# Patient Record
Sex: FEMALE | Race: WHITE | Employment: OTHER | URBAN - METROPOLITAN AREA
[De-identification: names, ages, dates, MRNs, and addresses within clinical notes are randomized per-mention and may not be internally consistent; named-entity substitution may affect disease eponyms.]

---

## 2018-01-16 PROBLEM — K81.9 CHOLECYSTITIS: Status: ACTIVE | Noted: 2018-01-16

## 2018-02-06 ENCOUNTER — OFFICE VISIT (OUTPATIENT)
Dept: SURGERY | Age: 83
End: 2018-02-06

## 2018-02-06 VITALS — HEIGHT: 61 IN | BODY MASS INDEX: 37.76 KG/M2 | WEIGHT: 200 LBS

## 2018-02-06 DIAGNOSIS — K81.9 CHOLECYSTITIS: Primary | ICD-10-CM

## 2018-02-06 PROCEDURE — 99024 POSTOP FOLLOW-UP VISIT: CPT | Performed by: SURGERY

## 2019-01-03 ENCOUNTER — HOSPITAL ENCOUNTER (EMERGENCY)
Age: 84
Discharge: HOME | End: 2019-01-03
Attending: EMERGENCY MEDICINE
Payer: MEDICARE

## 2019-01-03 ENCOUNTER — APPOINTMENT (OUTPATIENT)
Dept: GENERAL RADIOLOGY | Age: 84
End: 2019-01-03
Payer: MEDICARE

## 2019-01-03 VITALS
SYSTOLIC BLOOD PRESSURE: 118 MMHG | TEMPERATURE: 99.3 F | WEIGHT: 225.53 LBS | DIASTOLIC BLOOD PRESSURE: 74 MMHG | HEART RATE: 84 BPM | BODY MASS INDEX: 41.5 KG/M2 | RESPIRATION RATE: 20 BRPM | HEIGHT: 62 IN | OXYGEN SATURATION: 94 %

## 2019-01-03 DIAGNOSIS — R05.3 PERSISTENT COUGH: Primary | ICD-10-CM

## 2019-01-03 LAB
A/G RATIO: 0.9 (ref 1.1–2.2)
ALBUMIN SERPL-MCNC: 3.6 G/DL (ref 3.4–5)
ALP BLD-CCNC: 102 U/L (ref 40–129)
ALT SERPL-CCNC: 12 U/L (ref 10–40)
ANION GAP SERPL CALCULATED.3IONS-SCNC: 12 MMOL/L (ref 3–16)
AST SERPL-CCNC: 18 U/L (ref 15–37)
BILIRUB SERPL-MCNC: 0.6 MG/DL (ref 0–1)
BUN BLDV-MCNC: 20 MG/DL (ref 7–20)
CALCIUM SERPL-MCNC: 8.9 MG/DL (ref 8.3–10.6)
CHLORIDE BLD-SCNC: 100 MMOL/L (ref 99–110)
CO2: 29 MMOL/L (ref 21–32)
CREAT SERPL-MCNC: 1 MG/DL (ref 0.6–1.2)
EKG ATRIAL RATE: 87 BPM
EKG DIAGNOSIS: NORMAL
EKG P AXIS: 56 DEGREES
EKG P-R INTERVAL: 168 MS
EKG Q-T INTERVAL: 366 MS
EKG QRS DURATION: 70 MS
EKG QTC CALCULATION (BAZETT): 440 MS
EKG R AXIS: 48 DEGREES
EKG T AXIS: 71 DEGREES
EKG VENTRICULAR RATE: 87 BPM
GFR AFRICAN AMERICAN: >60
GFR NON-AFRICAN AMERICAN: 53
GLOBULIN: 4.1 G/DL
GLUCOSE BLD-MCNC: 106 MG/DL (ref 70–99)
HCT VFR BLD CALC: 39.9 % (ref 36–48)
HEMOGLOBIN: 13.2 G/DL (ref 12–16)
MCH RBC QN AUTO: 29.1 PG (ref 26–34)
MCHC RBC AUTO-ENTMCNC: 33 G/DL (ref 31–36)
MCV RBC AUTO: 88.2 FL (ref 80–100)
PDW BLD-RTO: 14.3 % (ref 12.4–15.4)
PLATELET # BLD: 241 K/UL (ref 135–450)
PMV BLD AUTO: 6.9 FL (ref 5–10.5)
POTASSIUM SERPL-SCNC: 4 MMOL/L (ref 3.5–5.1)
RBC # BLD: 4.53 M/UL (ref 4–5.2)
SODIUM BLD-SCNC: 141 MMOL/L (ref 136–145)
TOTAL PROTEIN: 7.7 G/DL (ref 6.4–8.2)
TROPONIN: <0.01 NG/ML
WBC # BLD: 11 K/UL (ref 4–11)

## 2019-01-03 PROCEDURE — 96374 THER/PROPH/DIAG INJ IV PUSH: CPT

## 2019-01-03 PROCEDURE — 71046 X-RAY EXAM CHEST 2 VIEWS: CPT

## 2019-01-03 PROCEDURE — 93005 ELECTROCARDIOGRAM TRACING: CPT | Performed by: EMERGENCY MEDICINE

## 2019-01-03 PROCEDURE — 84484 ASSAY OF TROPONIN QUANT: CPT

## 2019-01-03 PROCEDURE — 6370000000 HC RX 637 (ALT 250 FOR IP): Performed by: NURSE PRACTITIONER

## 2019-01-03 PROCEDURE — 99284 EMERGENCY DEPT VISIT MOD MDM: CPT

## 2019-01-03 PROCEDURE — 85027 COMPLETE CBC AUTOMATED: CPT

## 2019-01-03 PROCEDURE — 93010 ELECTROCARDIOGRAM REPORT: CPT | Performed by: INTERNAL MEDICINE

## 2019-01-03 PROCEDURE — 94640 AIRWAY INHALATION TREATMENT: CPT

## 2019-01-03 PROCEDURE — 6360000002 HC RX W HCPCS: Performed by: NURSE PRACTITIONER

## 2019-01-03 PROCEDURE — 80053 COMPREHEN METABOLIC PANEL: CPT

## 2019-01-03 PROCEDURE — 36415 COLL VENOUS BLD VENIPUNCTURE: CPT

## 2019-01-03 RX ORDER — IPRATROPIUM BROMIDE AND ALBUTEROL SULFATE 2.5; .5 MG/3ML; MG/3ML
1 SOLUTION RESPIRATORY (INHALATION) ONCE
Status: COMPLETED | OUTPATIENT
Start: 2019-01-03 | End: 2019-01-03

## 2019-01-03 RX ORDER — OXYMETAZOLINE HYDROCHLORIDE 0.05 G/100ML
2 SPRAY NASAL 2 TIMES DAILY
Qty: 1 BOTTLE | Refills: 0 | Status: SHIPPED | OUTPATIENT
Start: 2019-01-03 | End: 2019-01-06

## 2019-01-03 RX ORDER — DEXTROMETHORPHAN HYDROBROMIDE AND PROMETHAZINE HYDROCHLORIDE 15; 6.25 MG/5ML; MG/5ML
5 SYRUP ORAL 4 TIMES DAILY PRN
Qty: 118 ML | Refills: 0 | Status: ON HOLD | OUTPATIENT
Start: 2019-01-03 | End: 2020-01-01

## 2019-01-03 RX ORDER — METHYLPREDNISOLONE 4 MG/1
TABLET ORAL
Qty: 1 KIT | Refills: 0 | Status: ON HOLD | OUTPATIENT
Start: 2019-01-03 | End: 2020-01-01

## 2019-01-03 RX ORDER — HYDROCHLOROTHIAZIDE 25 MG/1
25 TABLET ORAL DAILY
COMMUNITY
Start: 2018-12-17 | End: 2019-01-03

## 2019-01-03 RX ORDER — METHYLPREDNISOLONE SODIUM SUCCINATE 125 MG/2ML
125 INJECTION, POWDER, LYOPHILIZED, FOR SOLUTION INTRAMUSCULAR; INTRAVENOUS ONCE
Status: COMPLETED | OUTPATIENT
Start: 2019-01-03 | End: 2019-01-03

## 2019-01-03 RX ADMIN — IPRATROPIUM BROMIDE AND ALBUTEROL SULFATE 1 AMPULE: .5; 3 SOLUTION RESPIRATORY (INHALATION) at 03:27

## 2019-01-03 RX ADMIN — METHYLPREDNISOLONE SODIUM SUCCINATE 125 MG: 125 INJECTION, POWDER, FOR SOLUTION INTRAMUSCULAR; INTRAVENOUS at 02:55

## 2019-01-03 RX ADMIN — HYDROCODONE BITARTRATE AND HOMATROPINE METHYLBROMIDE 5 ML: 5; 1.5 SOLUTION ORAL at 02:57

## 2019-01-03 ASSESSMENT — PAIN DESCRIPTION - PAIN TYPE: TYPE: ACUTE PAIN

## 2019-01-03 ASSESSMENT — ENCOUNTER SYMPTOMS
BACK PAIN: 0
SHORTNESS OF BREATH: 1
ABDOMINAL PAIN: 0
NAUSEA: 0
VOMITING: 0
DIARRHEA: 0
COUGH: 1
CHEST TIGHTNESS: 0
COLOR CHANGE: 0

## 2019-01-03 ASSESSMENT — PAIN DESCRIPTION - DESCRIPTORS: DESCRIPTORS: ACHING

## 2019-01-03 ASSESSMENT — PAIN SCALES - GENERAL: PAINLEVEL_OUTOF10: 1

## 2020-01-01 ENCOUNTER — APPOINTMENT (OUTPATIENT)
Dept: CT IMAGING | Age: 85
DRG: 189 | End: 2020-01-01
Payer: MEDICARE

## 2020-01-01 ENCOUNTER — APPOINTMENT (OUTPATIENT)
Dept: GENERAL RADIOLOGY | Age: 85
DRG: 189 | End: 2020-01-01
Payer: MEDICARE

## 2020-01-01 ENCOUNTER — HOSPITAL ENCOUNTER (INPATIENT)
Age: 85
LOS: 7 days | Discharge: HOME HEALTH | DRG: 189 | End: 2020-09-29
Attending: STUDENT IN AN ORGANIZED HEALTH CARE EDUCATION/TRAINING PROGRAM | Admitting: STUDENT IN AN ORGANIZED HEALTH CARE EDUCATION/TRAINING PROGRAM
Payer: MEDICARE

## 2020-01-01 ENCOUNTER — TELEPHONE (OUTPATIENT)
Dept: PULMONOLOGY | Age: 85
End: 2020-01-01

## 2020-01-01 ENCOUNTER — OFFICE VISIT (OUTPATIENT)
Dept: PULMONOLOGY | Age: 85
End: 2020-01-01
Payer: MEDICARE

## 2020-01-01 ENCOUNTER — CARE COORDINATION (OUTPATIENT)
Dept: CASE MANAGEMENT | Age: 85
End: 2020-01-01

## 2020-01-01 ENCOUNTER — APPOINTMENT (OUTPATIENT)
Dept: CT IMAGING | Age: 85
End: 2020-01-01
Payer: MEDICARE

## 2020-01-01 ENCOUNTER — HOSPITAL ENCOUNTER (INPATIENT)
Age: 85
LOS: 3 days | Discharge: HOME | DRG: 189 | End: 2020-05-12
Attending: STUDENT IN AN ORGANIZED HEALTH CARE EDUCATION/TRAINING PROGRAM | Admitting: INTERNAL MEDICINE
Payer: MEDICARE

## 2020-01-01 ENCOUNTER — APPOINTMENT (OUTPATIENT)
Dept: GENERAL RADIOLOGY | Age: 85
End: 2020-01-01
Payer: MEDICARE

## 2020-01-01 ENCOUNTER — APPOINTMENT (OUTPATIENT)
Dept: GENERAL RADIOLOGY | Age: 85
DRG: 208 | End: 2020-01-01
Payer: MEDICARE

## 2020-01-01 ENCOUNTER — HOSPITAL ENCOUNTER (INPATIENT)
Age: 85
LOS: 1 days | DRG: 208 | End: 2020-10-02
Attending: EMERGENCY MEDICINE | Admitting: INTERNAL MEDICINE
Payer: MEDICARE

## 2020-01-01 ENCOUNTER — HOSPITAL ENCOUNTER (EMERGENCY)
Age: 85
Discharge: HOME | End: 2020-06-14
Attending: EMERGENCY MEDICINE
Payer: MEDICARE

## 2020-01-01 ENCOUNTER — APPOINTMENT (OUTPATIENT)
Dept: CT IMAGING | Age: 85
DRG: 208 | End: 2020-01-01
Payer: MEDICARE

## 2020-01-01 VITALS
HEART RATE: 80 BPM | RESPIRATION RATE: 16 BRPM | SYSTOLIC BLOOD PRESSURE: 130 MMHG | HEIGHT: 61 IN | TEMPERATURE: 98.1 F | DIASTOLIC BLOOD PRESSURE: 78 MMHG | BODY MASS INDEX: 44.02 KG/M2 | OXYGEN SATURATION: 96 %

## 2020-01-01 VITALS
TEMPERATURE: 98.7 F | BODY MASS INDEX: 45.23 KG/M2 | DIASTOLIC BLOOD PRESSURE: 60 MMHG | HEART RATE: 106 BPM | OXYGEN SATURATION: 97 % | HEIGHT: 60 IN | SYSTOLIC BLOOD PRESSURE: 131 MMHG | WEIGHT: 230.38 LBS | RESPIRATION RATE: 16 BRPM

## 2020-01-01 VITALS
BODY MASS INDEX: 51.04 KG/M2 | HEART RATE: 85 BPM | HEIGHT: 60 IN | DIASTOLIC BLOOD PRESSURE: 86 MMHG | TEMPERATURE: 98.3 F | OXYGEN SATURATION: 97 % | WEIGHT: 260 LBS | SYSTOLIC BLOOD PRESSURE: 150 MMHG | RESPIRATION RATE: 20 BRPM

## 2020-01-01 VITALS
DIASTOLIC BLOOD PRESSURE: 68 MMHG | TEMPERATURE: 97.9 F | HEART RATE: 84 BPM | WEIGHT: 230 LBS | HEIGHT: 60 IN | SYSTOLIC BLOOD PRESSURE: 129 MMHG | RESPIRATION RATE: 22 BRPM | BODY MASS INDEX: 45.16 KG/M2 | OXYGEN SATURATION: 99 %

## 2020-01-01 VITALS
BODY MASS INDEX: 43.99 KG/M2 | TEMPERATURE: 98.1 F | HEIGHT: 61 IN | SYSTOLIC BLOOD PRESSURE: 179 MMHG | OXYGEN SATURATION: 99 % | RESPIRATION RATE: 20 BRPM | DIASTOLIC BLOOD PRESSURE: 82 MMHG | HEART RATE: 79 BPM | WEIGHT: 233 LBS

## 2020-01-01 VITALS
SYSTOLIC BLOOD PRESSURE: 99 MMHG | DIASTOLIC BLOOD PRESSURE: 49 MMHG | RESPIRATION RATE: 25 BRPM | OXYGEN SATURATION: 94 % | TEMPERATURE: 98 F | HEART RATE: 86 BPM | BODY MASS INDEX: 44.1 KG/M2 | WEIGHT: 224.65 LBS | HEIGHT: 60 IN

## 2020-01-01 LAB
A/G RATIO: 0.9 (ref 1.1–2.2)
A/G RATIO: 1 (ref 1.1–2.2)
ACETAMINOPHEN LEVEL: <5 UG/ML (ref 10–30)
ALBUMIN SERPL-MCNC: 3 G/DL (ref 3.4–5)
ALBUMIN SERPL-MCNC: 3.2 G/DL (ref 3.4–5)
ALBUMIN SERPL-MCNC: 3.2 G/DL (ref 3.4–5)
ALBUMIN SERPL-MCNC: 3.5 G/DL (ref 3.4–5)
ALBUMIN SERPL-MCNC: 3.6 G/DL (ref 3.4–5)
ALBUMIN SERPL-MCNC: 3.7 G/DL (ref 3.4–5)
ALP BLD-CCNC: 71 U/L (ref 40–129)
ALP BLD-CCNC: 82 U/L (ref 40–129)
ALP BLD-CCNC: 86 U/L (ref 40–129)
ALT SERPL-CCNC: 11 U/L (ref 10–40)
ALT SERPL-CCNC: 12 U/L (ref 10–40)
ALT SERPL-CCNC: 15 U/L (ref 10–40)
AMPHETAMINE SCREEN, URINE: NORMAL
ANION GAP SERPL CALCULATED.3IONS-SCNC: 10 MMOL/L (ref 3–16)
ANION GAP SERPL CALCULATED.3IONS-SCNC: 10 MMOL/L (ref 3–16)
ANION GAP SERPL CALCULATED.3IONS-SCNC: 14 MMOL/L (ref 3–16)
ANION GAP SERPL CALCULATED.3IONS-SCNC: 15 MMOL/L (ref 3–16)
ANION GAP SERPL CALCULATED.3IONS-SCNC: 23 MMOL/L (ref 3–16)
ANION GAP SERPL CALCULATED.3IONS-SCNC: 3 MMOL/L (ref 3–16)
ANION GAP SERPL CALCULATED.3IONS-SCNC: 3 MMOL/L (ref 3–16)
ANION GAP SERPL CALCULATED.3IONS-SCNC: 6 MMOL/L (ref 3–16)
ANION GAP SERPL CALCULATED.3IONS-SCNC: 6 MMOL/L (ref 3–16)
ANION GAP SERPL CALCULATED.3IONS-SCNC: 7 MMOL/L (ref 3–16)
ANION GAP SERPL CALCULATED.3IONS-SCNC: 8 MMOL/L (ref 3–16)
ANISOCYTOSIS: ABNORMAL
APTT: 30.8 SEC (ref 24.2–36.2)
AST SERPL-CCNC: 16 U/L (ref 15–37)
AST SERPL-CCNC: 20 U/L (ref 15–37)
AST SERPL-CCNC: 23 U/L (ref 15–37)
BACTERIA: ABNORMAL /HPF
BANDED NEUTROPHILS RELATIVE PERCENT: 2 % (ref 0–7)
BARBITURATE SCREEN URINE: NORMAL
BASE EXCESS ARTERIAL: -9.8 MMOL/L (ref -3–3)
BASE EXCESS ARTERIAL: 10.7 MMOL/L (ref -3–3)
BASE EXCESS ARTERIAL: 13.3 MMOL/L (ref -3–3)
BASE EXCESS ARTERIAL: 18.2 MMOL/L (ref -3–3)
BASE EXCESS ARTERIAL: 3.5 MMOL/L (ref -3–3)
BASE EXCESS ARTERIAL: 4.9 MMOL/L (ref -3–3)
BASE EXCESS ARTERIAL: 5.2 MMOL/L (ref -3–3)
BASE EXCESS VENOUS: 10.6 MMOL/L
BASE EXCESS VENOUS: 4.9 MMOL/L
BASE EXCESS VENOUS: 7.7 MMOL/L
BASOPHILS ABSOLUTE: 0 K/UL (ref 0–0.2)
BASOPHILS ABSOLUTE: 0.1 K/UL (ref 0–0.2)
BASOPHILS ABSOLUTE: 0.1 K/UL (ref 0–0.2)
BASOPHILS RELATIVE PERCENT: 0 %
BASOPHILS RELATIVE PERCENT: 0.1 %
BASOPHILS RELATIVE PERCENT: 0.2 %
BASOPHILS RELATIVE PERCENT: 0.3 %
BASOPHILS RELATIVE PERCENT: 0.3 %
BASOPHILS RELATIVE PERCENT: 0.7 %
BASOPHILS RELATIVE PERCENT: 1.1 %
BENZODIAZEPINE SCREEN, URINE: NORMAL
BILIRUB SERPL-MCNC: 0.5 MG/DL (ref 0–1)
BILIRUB SERPL-MCNC: 0.7 MG/DL (ref 0–1)
BILIRUB SERPL-MCNC: 0.8 MG/DL (ref 0–1)
BILIRUBIN DIRECT: <0.2 MG/DL (ref 0–0.3)
BILIRUBIN URINE: NEGATIVE
BILIRUBIN, INDIRECT: NORMAL MG/DL (ref 0–1)
BLOOD CULTURE, ROUTINE: ABNORMAL
BLOOD CULTURE, ROUTINE: NORMAL
BLOOD CULTURE, ROUTINE: NORMAL
BLOOD, URINE: ABNORMAL
BLOOD, URINE: ABNORMAL
BLOOD, URINE: NEGATIVE
BUN BLDV-MCNC: 15 MG/DL (ref 7–20)
BUN BLDV-MCNC: 16 MG/DL (ref 7–20)
BUN BLDV-MCNC: 16 MG/DL (ref 7–20)
BUN BLDV-MCNC: 18 MG/DL (ref 7–20)
BUN BLDV-MCNC: 22 MG/DL (ref 7–20)
BUN BLDV-MCNC: 23 MG/DL (ref 7–20)
BUN BLDV-MCNC: 24 MG/DL (ref 7–20)
BUN BLDV-MCNC: 26 MG/DL (ref 7–20)
BUN BLDV-MCNC: 28 MG/DL (ref 7–20)
BUN BLDV-MCNC: 29 MG/DL (ref 7–20)
BUN BLDV-MCNC: 29 MG/DL (ref 7–20)
BUN BLDV-MCNC: 38 MG/DL (ref 7–20)
BUN BLDV-MCNC: 39 MG/DL (ref 7–20)
BUN BLDV-MCNC: 39 MG/DL (ref 7–20)
BUN BLDV-MCNC: 41 MG/DL (ref 7–20)
BUN BLDV-MCNC: 42 MG/DL (ref 7–20)
CALCIUM IONIZED: 1.03 MMOL/L (ref 1.12–1.32)
CALCIUM IONIZED: 1.06 MMOL/L (ref 1.12–1.32)
CALCIUM SERPL-MCNC: 8 MG/DL (ref 8.3–10.6)
CALCIUM SERPL-MCNC: 8.1 MG/DL (ref 8.3–10.6)
CALCIUM SERPL-MCNC: 8.1 MG/DL (ref 8.3–10.6)
CALCIUM SERPL-MCNC: 8.2 MG/DL (ref 8.3–10.6)
CALCIUM SERPL-MCNC: 8.3 MG/DL (ref 8.3–10.6)
CALCIUM SERPL-MCNC: 8.3 MG/DL (ref 8.3–10.6)
CALCIUM SERPL-MCNC: 8.4 MG/DL (ref 8.3–10.6)
CALCIUM SERPL-MCNC: 8.5 MG/DL (ref 8.3–10.6)
CALCIUM SERPL-MCNC: 8.7 MG/DL (ref 8.3–10.6)
CALCIUM SERPL-MCNC: 9.5 MG/DL (ref 8.3–10.6)
CANNABINOID SCREEN URINE: NORMAL
CARBOXYHEMOGLOBIN ARTERIAL: 0 % (ref 0–1.5)
CARBOXYHEMOGLOBIN ARTERIAL: 0.1 % (ref 0–1.5)
CARBOXYHEMOGLOBIN ARTERIAL: 0.3 % (ref 0–1.5)
CARBOXYHEMOGLOBIN ARTERIAL: 0.3 % (ref 0–1.5)
CARBOXYHEMOGLOBIN ARTERIAL: 0.6 % (ref 0–1.5)
CARBOXYHEMOGLOBIN ARTERIAL: 0.8 % (ref 0–1.5)
CARBOXYHEMOGLOBIN ARTERIAL: 0.8 % (ref 0–1.5)
CARBOXYHEMOGLOBIN: 1 %
CARBOXYHEMOGLOBIN: 1.1 %
CARBOXYHEMOGLOBIN: 1.3 %
CARBOXYHEMOGLOBIN: 1.3 %
CARBOXYHEMOGLOBIN: 1.5 %
CHLORIDE BLD-SCNC: 101 MMOL/L (ref 99–110)
CHLORIDE BLD-SCNC: 88 MMOL/L (ref 99–110)
CHLORIDE BLD-SCNC: 89 MMOL/L (ref 99–110)
CHLORIDE BLD-SCNC: 89 MMOL/L (ref 99–110)
CHLORIDE BLD-SCNC: 90 MMOL/L (ref 99–110)
CHLORIDE BLD-SCNC: 90 MMOL/L (ref 99–110)
CHLORIDE BLD-SCNC: 91 MMOL/L (ref 99–110)
CHLORIDE BLD-SCNC: 93 MMOL/L (ref 99–110)
CHLORIDE BLD-SCNC: 94 MMOL/L (ref 99–110)
CHLORIDE BLD-SCNC: 95 MMOL/L (ref 99–110)
CHLORIDE BLD-SCNC: 97 MMOL/L (ref 99–110)
CHP ED QC CHECK: YES
CLARITY: ABNORMAL
CLARITY: CLEAR
CLARITY: CLEAR
CO2: 21 MMOL/L (ref 21–32)
CO2: 24 MMOL/L (ref 21–32)
CO2: 27 MMOL/L (ref 21–32)
CO2: 31 MMOL/L (ref 21–32)
CO2: 31 MMOL/L (ref 21–32)
CO2: 32 MMOL/L (ref 21–32)
CO2: 32 MMOL/L (ref 21–32)
CO2: 34 MMOL/L (ref 21–32)
CO2: 35 MMOL/L (ref 21–32)
CO2: 39 MMOL/L (ref 21–32)
CO2: 40 MMOL/L (ref 21–32)
CO2: 40 MMOL/L (ref 21–32)
CO2: 41 MMOL/L (ref 21–32)
CO2: 41 MMOL/L (ref 21–32)
CO2: 43 MMOL/L (ref 21–32)
CO2: 44 MMOL/L (ref 21–32)
COCAINE METABOLITE SCREEN URINE: NORMAL
COLOR: YELLOW
CREAT SERPL-MCNC: 0.7 MG/DL (ref 0.6–1.2)
CREAT SERPL-MCNC: 0.8 MG/DL (ref 0.6–1.2)
CREAT SERPL-MCNC: 0.9 MG/DL (ref 0.6–1.2)
CREAT SERPL-MCNC: 1 MG/DL (ref 0.6–1.2)
CREAT SERPL-MCNC: 1.2 MG/DL (ref 0.6–1.2)
CREAT SERPL-MCNC: 1.3 MG/DL (ref 0.6–1.2)
CREAT SERPL-MCNC: 1.4 MG/DL (ref 0.6–1.2)
CREAT SERPL-MCNC: 1.5 MG/DL (ref 0.6–1.2)
CULTURE, BLOOD 2: ABNORMAL
CULTURE, BLOOD 2: NORMAL
D DIMER: 915 NG/ML DDU (ref 0–229)
EKG ATRIAL RATE: 117 BPM
EKG ATRIAL RATE: 139 BPM
EKG ATRIAL RATE: 77 BPM
EKG ATRIAL RATE: 88 BPM
EKG ATRIAL RATE: 91 BPM
EKG DIAGNOSIS: NORMAL
EKG P AXIS: -14 DEGREES
EKG P AXIS: -22 DEGREES
EKG P AXIS: 58 DEGREES
EKG P AXIS: 60 DEGREES
EKG P AXIS: 68 DEGREES
EKG P-R INTERVAL: 138 MS
EKG P-R INTERVAL: 150 MS
EKG P-R INTERVAL: 160 MS
EKG P-R INTERVAL: 162 MS
EKG P-R INTERVAL: 182 MS
EKG Q-T INTERVAL: 214 MS
EKG Q-T INTERVAL: 324 MS
EKG Q-T INTERVAL: 346 MS
EKG Q-T INTERVAL: 358 MS
EKG Q-T INTERVAL: 378 MS
EKG QRS DURATION: 70 MS
EKG QRS DURATION: 70 MS
EKG QRS DURATION: 72 MS
EKG QRS DURATION: 74 MS
EKG QRS DURATION: 84 MS
EKG QTC CALCULATION (BAZETT): 325 MS
EKG QTC CALCULATION (BAZETT): 425 MS
EKG QTC CALCULATION (BAZETT): 427 MS
EKG QTC CALCULATION (BAZETT): 433 MS
EKG QTC CALCULATION (BAZETT): 451 MS
EKG R AXIS: -33 DEGREES
EKG R AXIS: 16 DEGREES
EKG R AXIS: 17 DEGREES
EKG R AXIS: 26 DEGREES
EKG R AXIS: 9 DEGREES
EKG T AXIS: 256 DEGREES
EKG T AXIS: 50 DEGREES
EKG T AXIS: 58 DEGREES
EKG T AXIS: 64 DEGREES
EKG T AXIS: 81 DEGREES
EKG VENTRICULAR RATE: 117 BPM
EKG VENTRICULAR RATE: 139 BPM
EKG VENTRICULAR RATE: 77 BPM
EKG VENTRICULAR RATE: 88 BPM
EKG VENTRICULAR RATE: 91 BPM
EOSINOPHILS ABSOLUTE: 0 K/UL (ref 0–0.6)
EOSINOPHILS ABSOLUTE: 0.1 K/UL (ref 0–0.6)
EOSINOPHILS ABSOLUTE: 0.2 K/UL (ref 0–0.6)
EOSINOPHILS RELATIVE PERCENT: 0 %
EOSINOPHILS RELATIVE PERCENT: 0.4 %
EOSINOPHILS RELATIVE PERCENT: 0.7 %
EOSINOPHILS RELATIVE PERCENT: 0.9 %
EOSINOPHILS RELATIVE PERCENT: 1 %
EOSINOPHILS RELATIVE PERCENT: 1.3 %
EOSINOPHILS RELATIVE PERCENT: 1.5 %
EOSINOPHILS RELATIVE PERCENT: 1.9 %
EPITHELIAL CELLS, UA: 0 /HPF (ref 0–5)
EPITHELIAL CELLS, UA: 1 /HPF (ref 0–5)
ESTIMATED AVERAGE GLUCOSE: 151.3 MG/DL
ETHANOL: NORMAL MG/DL (ref 0–0.08)
GFR AFRICAN AMERICAN: 40
GFR AFRICAN AMERICAN: 43
GFR AFRICAN AMERICAN: 47
GFR AFRICAN AMERICAN: 52
GFR AFRICAN AMERICAN: >60
GFR NON-AFRICAN AMERICAN: 33
GFR NON-AFRICAN AMERICAN: 36
GFR NON-AFRICAN AMERICAN: 39
GFR NON-AFRICAN AMERICAN: 43
GFR NON-AFRICAN AMERICAN: 53
GFR NON-AFRICAN AMERICAN: 59
GFR NON-AFRICAN AMERICAN: >60
GLOBULIN: 3.4 G/DL
GLOBULIN: 4.2 G/DL
GLUCOSE BLD-MCNC: 116 MG/DL (ref 70–99)
GLUCOSE BLD-MCNC: 117 MG/DL (ref 70–99)
GLUCOSE BLD-MCNC: 119 MG/DL (ref 70–99)
GLUCOSE BLD-MCNC: 125 MG/DL (ref 70–99)
GLUCOSE BLD-MCNC: 140 MG/DL (ref 70–99)
GLUCOSE BLD-MCNC: 158 MG/DL (ref 70–99)
GLUCOSE BLD-MCNC: 166 MG/DL (ref 70–99)
GLUCOSE BLD-MCNC: 172 MG/DL (ref 70–99)
GLUCOSE BLD-MCNC: 179 MG/DL
GLUCOSE BLD-MCNC: 179 MG/DL (ref 70–99)
GLUCOSE BLD-MCNC: 181 MG/DL (ref 70–99)
GLUCOSE BLD-MCNC: 190 MG/DL (ref 70–99)
GLUCOSE BLD-MCNC: 195 MG/DL (ref 70–99)
GLUCOSE BLD-MCNC: 196 MG/DL (ref 70–99)
GLUCOSE BLD-MCNC: 198 MG/DL (ref 70–99)
GLUCOSE BLD-MCNC: 207 MG/DL (ref 70–99)
GLUCOSE BLD-MCNC: 215 MG/DL (ref 70–99)
GLUCOSE BLD-MCNC: 217 MG/DL (ref 70–99)
GLUCOSE BLD-MCNC: 228 MG/DL (ref 70–99)
GLUCOSE BLD-MCNC: 229 MG/DL (ref 70–99)
GLUCOSE BLD-MCNC: 230 MG/DL (ref 70–99)
GLUCOSE BLD-MCNC: 233 MG/DL (ref 70–99)
GLUCOSE BLD-MCNC: 233 MG/DL (ref 70–99)
GLUCOSE BLD-MCNC: 236 MG/DL (ref 70–99)
GLUCOSE BLD-MCNC: 242 MG/DL (ref 70–99)
GLUCOSE BLD-MCNC: 253 MG/DL (ref 70–99)
GLUCOSE BLD-MCNC: 253 MG/DL (ref 70–99)
GLUCOSE BLD-MCNC: 259 MG/DL (ref 70–99)
GLUCOSE BLD-MCNC: 261 MG/DL (ref 70–99)
GLUCOSE BLD-MCNC: 262 MG/DL (ref 70–99)
GLUCOSE BLD-MCNC: 266 MG/DL (ref 70–99)
GLUCOSE BLD-MCNC: 266 MG/DL (ref 70–99)
GLUCOSE BLD-MCNC: 271 MG/DL (ref 70–99)
GLUCOSE BLD-MCNC: 276 MG/DL (ref 70–99)
GLUCOSE BLD-MCNC: 283 MG/DL (ref 70–99)
GLUCOSE BLD-MCNC: 291 MG/DL (ref 70–99)
GLUCOSE BLD-MCNC: 293 MG/DL (ref 70–99)
GLUCOSE BLD-MCNC: 293 MG/DL (ref 70–99)
GLUCOSE BLD-MCNC: 295 MG/DL (ref 70–99)
GLUCOSE BLD-MCNC: 295 MG/DL (ref 70–99)
GLUCOSE BLD-MCNC: 298 MG/DL (ref 70–99)
GLUCOSE BLD-MCNC: 300 MG/DL (ref 70–99)
GLUCOSE BLD-MCNC: 301 MG/DL (ref 70–99)
GLUCOSE BLD-MCNC: 305 MG/DL (ref 70–99)
GLUCOSE BLD-MCNC: 313 MG/DL (ref 70–99)
GLUCOSE BLD-MCNC: 317 MG/DL (ref 70–99)
GLUCOSE BLD-MCNC: 324 MG/DL (ref 70–99)
GLUCOSE BLD-MCNC: 327 MG/DL (ref 70–99)
GLUCOSE BLD-MCNC: 328 MG/DL (ref 70–99)
GLUCOSE BLD-MCNC: 331 MG/DL (ref 70–99)
GLUCOSE BLD-MCNC: 332 MG/DL (ref 70–99)
GLUCOSE BLD-MCNC: 345 MG/DL (ref 70–99)
GLUCOSE BLD-MCNC: 347 MG/DL (ref 70–99)
GLUCOSE BLD-MCNC: 350 MG/DL (ref 70–99)
GLUCOSE BLD-MCNC: 357 MG/DL (ref 70–99)
GLUCOSE BLD-MCNC: 366 MG/DL (ref 70–99)
GLUCOSE BLD-MCNC: 366 MG/DL (ref 70–99)
GLUCOSE BLD-MCNC: 379 MG/DL (ref 70–99)
GLUCOSE BLD-MCNC: 405 MG/DL (ref 70–99)
GLUCOSE BLD-MCNC: 415 MG/DL (ref 70–99)
GLUCOSE BLD-MCNC: 43 MG/DL (ref 70–99)
GLUCOSE BLD-MCNC: 434 MG/DL (ref 70–99)
GLUCOSE BLD-MCNC: 464 MG/DL (ref 70–99)
GLUCOSE BLD-MCNC: 471 MG/DL (ref 70–99)
GLUCOSE BLD-MCNC: 473 MG/DL (ref 70–99)
GLUCOSE BLD-MCNC: 54 MG/DL (ref 70–99)
GLUCOSE BLD-MCNC: 62 MG/DL (ref 70–99)
GLUCOSE BLD-MCNC: 64 MG/DL (ref 70–99)
GLUCOSE BLD-MCNC: 76 MG/DL (ref 70–99)
GLUCOSE BLD-MCNC: 81 MG/DL (ref 70–99)
GLUCOSE BLD-MCNC: 88 MG/DL (ref 70–99)
GLUCOSE BLD-MCNC: 99 MG/DL (ref 70–99)
GLUCOSE URINE: >=1000 MG/DL
GLUCOSE URINE: NEGATIVE MG/DL
GLUCOSE URINE: NEGATIVE MG/DL
HBA1C MFR BLD: 6.9 %
HCO3 ARTERIAL: 18.1 MMOL/L (ref 21–29)
HCO3 ARTERIAL: 25.8 MMOL/L (ref 21–29)
HCO3 ARTERIAL: 35.4 MMOL/L (ref 21–29)
HCO3 ARTERIAL: 36 MMOL/L (ref 21–29)
HCO3 ARTERIAL: 41.5 MMOL/L (ref 21–29)
HCO3 ARTERIAL: 41.6 MMOL/L (ref 21–29)
HCO3 ARTERIAL: 46.5 MMOL/L (ref 21–29)
HCO3 VENOUS: 31 MMOL/L (ref 23–29)
HCO3 VENOUS: 39 MMOL/L (ref 23–29)
HCO3 VENOUS: 41 MMOL/L (ref 23–29)
HCT VFR BLD CALC: 31.3 % (ref 36–48)
HCT VFR BLD CALC: 32.3 % (ref 36–48)
HCT VFR BLD CALC: 32.7 % (ref 36–48)
HCT VFR BLD CALC: 32.9 % (ref 36–48)
HCT VFR BLD CALC: 33.2 % (ref 36–48)
HCT VFR BLD CALC: 34.1 % (ref 36–48)
HCT VFR BLD CALC: 34.1 % (ref 36–48)
HCT VFR BLD CALC: 35.6 % (ref 36–48)
HCT VFR BLD CALC: 36.3 % (ref 36–48)
HCT VFR BLD CALC: 38.6 % (ref 36–48)
HCT VFR BLD CALC: 38.6 % (ref 36–48)
HCT VFR BLD CALC: 39.2 % (ref 36–48)
HCT VFR BLD CALC: 41.8 % (ref 36–48)
HCT VFR BLD CALC: 43.9 % (ref 36–48)
HEMATOLOGY PATH CONSULT: NORMAL
HEMATOLOGY PATH CONSULT: YES
HEMOGLOBIN, ART, EXTENDED: 10.5 G/DL (ref 12–16)
HEMOGLOBIN, ART, EXTENDED: 11 G/DL (ref 12–16)
HEMOGLOBIN, ART, EXTENDED: 11 G/DL (ref 12–16)
HEMOGLOBIN, ART, EXTENDED: 11.6 G/DL (ref 12–16)
HEMOGLOBIN, ART, EXTENDED: 12.4 G/DL (ref 12–16)
HEMOGLOBIN, ART, EXTENDED: 13.6 G/DL (ref 12–16)
HEMOGLOBIN, ART, EXTENDED: 13.8 G/DL (ref 12–16)
HEMOGLOBIN: 10.3 G/DL (ref 12–16)
HEMOGLOBIN: 10.5 G/DL (ref 12–16)
HEMOGLOBIN: 10.5 G/DL (ref 12–16)
HEMOGLOBIN: 10.6 G/DL (ref 12–16)
HEMOGLOBIN: 10.8 G/DL (ref 12–16)
HEMOGLOBIN: 11 G/DL (ref 12–16)
HEMOGLOBIN: 11.4 G/DL (ref 12–16)
HEMOGLOBIN: 11.6 G/DL (ref 12–16)
HEMOGLOBIN: 11.7 G/DL (ref 12–16)
HEMOGLOBIN: 12.6 G/DL (ref 12–16)
HEMOGLOBIN: 12.8 G/DL (ref 12–16)
HEMOGLOBIN: 12.8 G/DL (ref 12–16)
HEMOGLOBIN: 13.4 G/DL (ref 12–16)
HEMOGLOBIN: 14 G/DL (ref 12–16)
HYALINE CASTS: 16 /LPF (ref 0–8)
HYALINE CASTS: 6 /LPF (ref 0–8)
INR BLD: 1.17 (ref 0.86–1.14)
KETONES, URINE: NEGATIVE MG/DL
LACTIC ACID, SEPSIS: 1.5 MMOL/L (ref 0.4–1.9)
LACTIC ACID: 0.7 MMOL/L (ref 0.4–2)
LACTIC ACID: 0.9 MMOL/L (ref 0.4–2)
LACTIC ACID: 0.9 MMOL/L (ref 0.4–2)
LACTIC ACID: 13.2 MMOL/L (ref 0.4–2)
LACTIC ACID: 4.2 MMOL/L (ref 0.4–2)
LEUKOCYTE ESTERASE, URINE: ABNORMAL
LEUKOCYTE ESTERASE, URINE: NEGATIVE
LEUKOCYTE ESTERASE, URINE: NEGATIVE
LYMPHOCYTES ABSOLUTE: 0.4 K/UL (ref 1–5.1)
LYMPHOCYTES ABSOLUTE: 0.5 K/UL (ref 1–5.1)
LYMPHOCYTES ABSOLUTE: 0.5 K/UL (ref 1–5.1)
LYMPHOCYTES ABSOLUTE: 0.6 K/UL (ref 1–5.1)
LYMPHOCYTES ABSOLUTE: 0.8 K/UL (ref 1–5.1)
LYMPHOCYTES ABSOLUTE: 1 K/UL (ref 1–5.1)
LYMPHOCYTES ABSOLUTE: 1.9 K/UL (ref 1–5.1)
LYMPHOCYTES ABSOLUTE: 2.2 K/UL (ref 1–5.1)
LYMPHOCYTES ABSOLUTE: 2.2 K/UL (ref 1–5.1)
LYMPHOCYTES ABSOLUTE: 2.5 K/UL (ref 1–5.1)
LYMPHOCYTES ABSOLUTE: 2.6 K/UL (ref 1–5.1)
LYMPHOCYTES ABSOLUTE: 6 K/UL (ref 1–5.1)
LYMPHOCYTES RELATIVE PERCENT: 10 %
LYMPHOCYTES RELATIVE PERCENT: 10 %
LYMPHOCYTES RELATIVE PERCENT: 10.1 %
LYMPHOCYTES RELATIVE PERCENT: 10.3 %
LYMPHOCYTES RELATIVE PERCENT: 14.3 %
LYMPHOCYTES RELATIVE PERCENT: 18.3 %
LYMPHOCYTES RELATIVE PERCENT: 20.1 %
LYMPHOCYTES RELATIVE PERCENT: 20.7 %
LYMPHOCYTES RELATIVE PERCENT: 21.7 %
LYMPHOCYTES RELATIVE PERCENT: 22.5 %
LYMPHOCYTES RELATIVE PERCENT: 37 %
LYMPHOCYTES RELATIVE PERCENT: 6 %
LYMPHOCYTES RELATIVE PERCENT: 7 %
LYMPHOCYTES RELATIVE PERCENT: 9.4 %
Lab: NORMAL
MAGNESIUM: 1.4 MG/DL (ref 1.8–2.4)
MAGNESIUM: 1.6 MG/DL (ref 1.8–2.4)
MAGNESIUM: 1.7 MG/DL (ref 1.8–2.4)
MAGNESIUM: 1.7 MG/DL (ref 1.8–2.4)
MAGNESIUM: 1.8 MG/DL (ref 1.8–2.4)
MAGNESIUM: 1.9 MG/DL (ref 1.8–2.4)
MAGNESIUM: 2.1 MG/DL (ref 1.8–2.4)
MAGNESIUM: 2.1 MG/DL (ref 1.8–2.4)
MAGNESIUM: 2.3 MG/DL (ref 1.8–2.4)
MCH RBC QN AUTO: 28.8 PG (ref 26–34)
MCH RBC QN AUTO: 28.9 PG (ref 26–34)
MCH RBC QN AUTO: 28.9 PG (ref 26–34)
MCH RBC QN AUTO: 29 PG (ref 26–34)
MCH RBC QN AUTO: 29.1 PG (ref 26–34)
MCH RBC QN AUTO: 29.2 PG (ref 26–34)
MCH RBC QN AUTO: 29.2 PG (ref 26–34)
MCH RBC QN AUTO: 29.3 PG (ref 26–34)
MCH RBC QN AUTO: 29.3 PG (ref 26–34)
MCH RBC QN AUTO: 29.5 PG (ref 26–34)
MCH RBC QN AUTO: 29.6 PG (ref 26–34)
MCH RBC QN AUTO: 29.7 PG (ref 26–34)
MCH RBC QN AUTO: 29.8 PG (ref 26–34)
MCH RBC QN AUTO: 29.9 PG (ref 26–34)
MCHC RBC AUTO-ENTMCNC: 30.7 G/DL (ref 31–36)
MCHC RBC AUTO-ENTMCNC: 31.9 G/DL (ref 31–36)
MCHC RBC AUTO-ENTMCNC: 31.9 G/DL (ref 31–36)
MCHC RBC AUTO-ENTMCNC: 32 G/DL (ref 31–36)
MCHC RBC AUTO-ENTMCNC: 32 G/DL (ref 31–36)
MCHC RBC AUTO-ENTMCNC: 32.5 G/DL (ref 31–36)
MCHC RBC AUTO-ENTMCNC: 32.6 G/DL (ref 31–36)
MCHC RBC AUTO-ENTMCNC: 32.6 G/DL (ref 31–36)
MCHC RBC AUTO-ENTMCNC: 32.9 G/DL (ref 31–36)
MCHC RBC AUTO-ENTMCNC: 33 G/DL (ref 31–36)
MCHC RBC AUTO-ENTMCNC: 33 G/DL (ref 31–36)
MCHC RBC AUTO-ENTMCNC: 33.1 G/DL (ref 31–36)
MCHC RBC AUTO-ENTMCNC: 33.4 G/DL (ref 31–36)
MCHC RBC AUTO-ENTMCNC: 33.5 G/DL (ref 31–36)
MCV RBC AUTO: 87.4 FL (ref 80–100)
MCV RBC AUTO: 87.5 FL (ref 80–100)
MCV RBC AUTO: 87.7 FL (ref 80–100)
MCV RBC AUTO: 88.4 FL (ref 80–100)
MCV RBC AUTO: 88.6 FL (ref 80–100)
MCV RBC AUTO: 89.8 FL (ref 80–100)
MCV RBC AUTO: 90 FL (ref 80–100)
MCV RBC AUTO: 90.3 FL (ref 80–100)
MCV RBC AUTO: 90.6 FL (ref 80–100)
MCV RBC AUTO: 90.7 FL (ref 80–100)
MCV RBC AUTO: 91 FL (ref 80–100)
MCV RBC AUTO: 91.4 FL (ref 80–100)
MCV RBC AUTO: 93.3 FL (ref 80–100)
MCV RBC AUTO: 95 FL (ref 80–100)
METAMYELOCYTES RELATIVE PERCENT: 2 %
METHADONE SCREEN, URINE: NORMAL
METHEMOGLOBIN ARTERIAL: 0.5 %
METHEMOGLOBIN ARTERIAL: 0.5 %
METHEMOGLOBIN ARTERIAL: 0.6 %
METHEMOGLOBIN ARTERIAL: 0.7 %
METHEMOGLOBIN ARTERIAL: 0.8 %
METHEMOGLOBIN ARTERIAL: 0.8 %
METHEMOGLOBIN ARTERIAL: 1.3 %
METHEMOGLOBIN VENOUS: 0.5 %
METHEMOGLOBIN VENOUS: 0.5 %
METHEMOGLOBIN VENOUS: 0.6 %
METHEMOGLOBIN VENOUS: 0.7 %
METHEMOGLOBIN VENOUS: 0.8 %
MICROSCOPIC EXAMINATION: NORMAL
MICROSCOPIC EXAMINATION: YES
MICROSCOPIC EXAMINATION: YES
MONOCYTES ABSOLUTE: 0.1 K/UL (ref 0–1.3)
MONOCYTES ABSOLUTE: 0.2 K/UL (ref 0–1.3)
MONOCYTES ABSOLUTE: 0.2 K/UL (ref 0–1.3)
MONOCYTES ABSOLUTE: 0.3 K/UL (ref 0–1.3)
MONOCYTES ABSOLUTE: 0.6 K/UL (ref 0–1.3)
MONOCYTES ABSOLUTE: 0.7 K/UL (ref 0–1.3)
MONOCYTES ABSOLUTE: 0.8 K/UL (ref 0–1.3)
MONOCYTES ABSOLUTE: 0.9 K/UL (ref 0–1.3)
MONOCYTES ABSOLUTE: 1 K/UL (ref 0–1.3)
MONOCYTES ABSOLUTE: 1 K/UL (ref 0–1.3)
MONOCYTES RELATIVE PERCENT: 1.2 %
MONOCYTES RELATIVE PERCENT: 1.3 %
MONOCYTES RELATIVE PERCENT: 1.6 %
MONOCYTES RELATIVE PERCENT: 2.5 %
MONOCYTES RELATIVE PERCENT: 3.5 %
MONOCYTES RELATIVE PERCENT: 3.7 %
MONOCYTES RELATIVE PERCENT: 5.9 %
MONOCYTES RELATIVE PERCENT: 6 %
MONOCYTES RELATIVE PERCENT: 7.3 %
MONOCYTES RELATIVE PERCENT: 7.4 %
MONOCYTES RELATIVE PERCENT: 7.5 %
MONOCYTES RELATIVE PERCENT: 7.7 %
MONOCYTES RELATIVE PERCENT: 8.1 %
MONOCYTES RELATIVE PERCENT: 9.1 %
NEUTROPHILS ABSOLUTE: 4.1 K/UL (ref 1.7–7.7)
NEUTROPHILS ABSOLUTE: 4.6 K/UL (ref 1.7–7.7)
NEUTROPHILS ABSOLUTE: 5.1 K/UL (ref 1.7–7.7)
NEUTROPHILS ABSOLUTE: 5.6 K/UL (ref 1.7–7.7)
NEUTROPHILS ABSOLUTE: 5.9 K/UL (ref 1.7–7.7)
NEUTROPHILS ABSOLUTE: 6 K/UL (ref 1.7–7.7)
NEUTROPHILS ABSOLUTE: 6.3 K/UL (ref 1.7–7.7)
NEUTROPHILS ABSOLUTE: 6.7 K/UL (ref 1.7–7.7)
NEUTROPHILS ABSOLUTE: 6.7 K/UL (ref 1.7–7.7)
NEUTROPHILS ABSOLUTE: 6.8 K/UL (ref 1.7–7.7)
NEUTROPHILS ABSOLUTE: 7.2 K/UL (ref 1.7–7.7)
NEUTROPHILS ABSOLUTE: 9.1 K/UL (ref 1.7–7.7)
NEUTROPHILS ABSOLUTE: 9.4 K/UL (ref 1.7–7.7)
NEUTROPHILS ABSOLUTE: 9.9 K/UL (ref 1.7–7.7)
NEUTROPHILS RELATIVE PERCENT: 52 %
NEUTROPHILS RELATIVE PERCENT: 68.6 %
NEUTROPHILS RELATIVE PERCENT: 69.2 %
NEUTROPHILS RELATIVE PERCENT: 69.3 %
NEUTROPHILS RELATIVE PERCENT: 71 %
NEUTROPHILS RELATIVE PERCENT: 73.8 %
NEUTROPHILS RELATIVE PERCENT: 76.5 %
NEUTROPHILS RELATIVE PERCENT: 81.6 %
NEUTROPHILS RELATIVE PERCENT: 86.1 %
NEUTROPHILS RELATIVE PERCENT: 88.2 %
NEUTROPHILS RELATIVE PERCENT: 88.5 %
NEUTROPHILS RELATIVE PERCENT: 89.1 %
NEUTROPHILS RELATIVE PERCENT: 89.2 %
NEUTROPHILS RELATIVE PERCENT: 91.4 %
NITRITE, URINE: NEGATIVE
NITRITE, URINE: NEGATIVE
NITRITE, URINE: POSITIVE
O2 CONTENT ARTERIAL: 14 ML/DL
O2 CONTENT ARTERIAL: 15 ML/DL
O2 CONTENT ARTERIAL: 15 ML/DL
O2 CONTENT ARTERIAL: 17 ML/DL
O2 CONTENT ARTERIAL: 18 ML/DL
O2 CONTENT ARTERIAL: 18 ML/DL
O2 CONTENT ARTERIAL: 19 ML/DL
O2 CONTENT, VEN: 14 ML/DL
O2 CONTENT, VEN: 16 ML/DL
O2 CONTENT, VEN: 8 ML/DL
O2 CONTENT, VEN: 9 ML/DL
O2 CONTENT, VEN: 9 ML/DL
O2 SAT, ARTERIAL: 95.7 %
O2 SAT, ARTERIAL: 97 %
O2 SAT, ARTERIAL: 98 %
O2 SAT, ARTERIAL: 98.1 %
O2 SAT, ARTERIAL: 98.3 %
O2 SAT, ARTERIAL: 98.5 %
O2 SAT, ARTERIAL: 99.9 %
O2 SAT, VEN: 48 %
O2 SAT, VEN: 51 %
O2 SAT, VEN: 56 %
O2 SAT, VEN: 71 %
O2 SAT, VEN: 91 %
O2 THERAPY: ABNORMAL
OPIATE SCREEN URINE: NORMAL
ORGANISM: ABNORMAL
OXYCODONE URINE: NORMAL
PCO2 ARTERIAL: 31.1 MMHG (ref 35–45)
PCO2 ARTERIAL: 46.9 MMHG (ref 35–45)
PCO2 ARTERIAL: 74.4 MMHG (ref 35–45)
PCO2 ARTERIAL: 78 MMHG (ref 35–45)
PCO2 ARTERIAL: 81.5 MMHG (ref 35–45)
PCO2 ARTERIAL: 89.8 MMHG (ref 35–45)
PCO2 ARTERIAL: 99.8 MMHG (ref 35–45)
PCO2, VEN: 108 MMHG (ref 40–50)
PCO2, VEN: 53.7 MMHG (ref 40–50)
PCO2, VEN: 73.1 MMHG (ref 40–50)
PCO2, VEN: >120 MMHG (ref 40–50)
PCO2, VEN: >120 MMHG (ref 40–50)
PDW BLD-RTO: 13.9 % (ref 12.4–15.4)
PDW BLD-RTO: 13.9 % (ref 12.4–15.4)
PDW BLD-RTO: 14.1 % (ref 12.4–15.4)
PDW BLD-RTO: 14.1 % (ref 12.4–15.4)
PDW BLD-RTO: 14.2 % (ref 12.4–15.4)
PDW BLD-RTO: 14.2 % (ref 12.4–15.4)
PDW BLD-RTO: 14.3 % (ref 12.4–15.4)
PDW BLD-RTO: 14.4 % (ref 12.4–15.4)
PDW BLD-RTO: 15 % (ref 12.4–15.4)
PDW BLD-RTO: 15.1 % (ref 12.4–15.4)
PDW BLD-RTO: 15.1 % (ref 12.4–15.4)
PDW BLD-RTO: 15.2 % (ref 12.4–15.4)
PDW BLD-RTO: 15.4 % (ref 12.4–15.4)
PDW BLD-RTO: 15.5 % (ref 12.4–15.4)
PERFORMED ON: ABNORMAL
PERFORMED ON: NORMAL
PH ARTERIAL: 7.19 (ref 7.35–7.45)
PH ARTERIAL: 7.21 (ref 7.35–7.45)
PH ARTERIAL: 7.23 (ref 7.35–7.45)
PH ARTERIAL: 7.25 (ref 7.35–7.45)
PH ARTERIAL: 7.36 (ref 7.35–7.45)
PH ARTERIAL: 7.38 (ref 7.35–7.45)
PH ARTERIAL: 7.53 (ref 7.35–7.45)
PH UA: 5
PH UA: 5 (ref 5–8)
PH UA: 5.5 (ref 5–8)
PH UA: 6.5 (ref 5–8)
PH VENOUS: 7.18 (ref 7.35–7.45)
PH VENOUS: 7.18 (ref 7.35–7.45)
PH VENOUS: 7.19 (ref 7.35–7.45)
PH VENOUS: 7.34 (ref 7.35–7.45)
PH VENOUS: 7.38 (ref 7.35–7.45)
PH VENOUS: 7.39 (ref 7.35–7.45)
PH VENOUS: 7.42 (ref 7.35–7.45)
PHENCYCLIDINE SCREEN URINE: NORMAL
PHOSPHORUS: 2.2 MG/DL (ref 2.5–4.9)
PHOSPHORUS: 2.4 MG/DL (ref 2.5–4.9)
PHOSPHORUS: 2.6 MG/DL (ref 2.5–4.9)
PHOSPHORUS: 2.8 MG/DL (ref 2.5–4.9)
PHOSPHORUS: 3 MG/DL (ref 2.5–4.9)
PHOSPHORUS: 3.2 MG/DL (ref 2.5–4.9)
PHOSPHORUS: 3.9 MG/DL (ref 2.5–4.9)
PHOSPHORUS: 4.6 MG/DL (ref 2.5–4.9)
PHOSPHORUS: 4.6 MG/DL (ref 2.5–4.9)
PLATELET # BLD: 128 K/UL (ref 135–450)
PLATELET # BLD: 129 K/UL (ref 135–450)
PLATELET # BLD: 129 K/UL (ref 135–450)
PLATELET # BLD: 130 K/UL (ref 135–450)
PLATELET # BLD: 134 K/UL (ref 135–450)
PLATELET # BLD: 146 K/UL (ref 135–450)
PLATELET # BLD: 153 K/UL (ref 135–450)
PLATELET # BLD: 154 K/UL (ref 135–450)
PLATELET # BLD: 166 K/UL (ref 135–450)
PLATELET # BLD: 171 K/UL (ref 135–450)
PLATELET # BLD: 180 K/UL (ref 135–450)
PLATELET # BLD: 193 K/UL (ref 135–450)
PLATELET # BLD: 235 K/UL (ref 135–450)
PLATELET # BLD: 242 K/UL (ref 135–450)
PLATELET SLIDE REVIEW: ADEQUATE
PMV BLD AUTO: 7 FL (ref 5–10.5)
PMV BLD AUTO: 7.1 FL (ref 5–10.5)
PMV BLD AUTO: 7.2 FL (ref 5–10.5)
PMV BLD AUTO: 7.3 FL (ref 5–10.5)
PMV BLD AUTO: 7.5 FL (ref 5–10.5)
PO2 ARTERIAL: 107 MMHG (ref 75–108)
PO2 ARTERIAL: 111 MMHG (ref 75–108)
PO2 ARTERIAL: 179 MMHG (ref 75–108)
PO2 ARTERIAL: 184 MMHG (ref 75–108)
PO2 ARTERIAL: 194 MMHG (ref 75–108)
PO2 ARTERIAL: 529 MMHG (ref 75–108)
PO2 ARTERIAL: 90.3 MMHG (ref 75–108)
PO2, VEN: 29 MMHG
PO2, VEN: 30 MMHG
PO2, VEN: 32 MMHG
PO2, VEN: 45 MMHG
PO2, VEN: 57 MMHG
POTASSIUM REFLEX MAGNESIUM: 4 MMOL/L (ref 3.5–5.1)
POTASSIUM REFLEX MAGNESIUM: 4.4 MMOL/L (ref 3.5–5.1)
POTASSIUM REFLEX MAGNESIUM: 4.5 MMOL/L (ref 3.5–5.1)
POTASSIUM REFLEX MAGNESIUM: 4.8 MMOL/L (ref 3.5–5.1)
POTASSIUM REFLEX MAGNESIUM: 5.2 MMOL/L (ref 3.5–5.1)
POTASSIUM SERPL-SCNC: 3.5 MMOL/L (ref 3.5–5.1)
POTASSIUM SERPL-SCNC: 3.6 MMOL/L (ref 3.5–5.1)
POTASSIUM SERPL-SCNC: 3.8 MMOL/L (ref 3.5–5.1)
POTASSIUM SERPL-SCNC: 3.9 MMOL/L (ref 3.5–5.1)
POTASSIUM SERPL-SCNC: 3.9 MMOL/L (ref 3.5–5.1)
POTASSIUM SERPL-SCNC: 4.2 MMOL/L (ref 3.5–5.1)
POTASSIUM SERPL-SCNC: 4.4 MMOL/L (ref 3.5–5.1)
POTASSIUM SERPL-SCNC: 4.6 MMOL/L (ref 3.5–5.1)
POTASSIUM SERPL-SCNC: 4.8 MMOL/L (ref 3.5–5.1)
POTASSIUM SERPL-SCNC: 5.1 MMOL/L (ref 3.5–5.1)
POTASSIUM SERPL-SCNC: 5.2 MMOL/L (ref 3.5–5.1)
PRO-BNP: 1090 PG/ML (ref 0–449)
PRO-BNP: 183 PG/ML (ref 0–449)
PRO-BNP: 282 PG/ML (ref 0–449)
PRO-BNP: 334 PG/ML (ref 0–449)
PROCALCITONIN: 0.05 NG/ML (ref 0–0.15)
PROPOXYPHENE SCREEN: NORMAL
PROTEIN UA: 100 MG/DL
PROTEIN UA: 100 MG/DL
PROTEIN UA: NEGATIVE MG/DL
PROTHROMBIN TIME: 13.6 SEC (ref 10–13.2)
RBC # BLD: 3.47 M/UL (ref 4–5.2)
RBC # BLD: 3.59 M/UL (ref 4–5.2)
RBC # BLD: 3.65 M/UL (ref 4–5.2)
RBC # BLD: 3.66 M/UL (ref 4–5.2)
RBC # BLD: 3.74 M/UL (ref 4–5.2)
RBC # BLD: 3.74 M/UL (ref 4–5.2)
RBC # BLD: 3.89 M/UL (ref 4–5.2)
RBC # BLD: 3.97 M/UL (ref 4–5.2)
RBC # BLD: 4.06 M/UL (ref 4–5.2)
RBC # BLD: 4.26 M/UL (ref 4–5.2)
RBC # BLD: 4.29 M/UL (ref 4–5.2)
RBC # BLD: 4.31 M/UL (ref 4–5.2)
RBC # BLD: 4.48 M/UL (ref 4–5.2)
RBC # BLD: 4.83 M/UL (ref 4–5.2)
RBC UA: 1 /HPF (ref 0–4)
RBC UA: 6 /HPF (ref 0–4)
REPORT: NORMAL
SALICYLATE, SERUM: <0.3 MG/DL (ref 15–30)
SARS-COV-2, NAAT: NOT DETECTED
SARS-COV-2: NOT DETECTED
SLIDE REVIEW: ABNORMAL
SLIDE REVIEW: ABNORMAL
SODIUM BLD-SCNC: 130 MMOL/L (ref 136–145)
SODIUM BLD-SCNC: 132 MMOL/L (ref 136–145)
SODIUM BLD-SCNC: 134 MMOL/L (ref 136–145)
SODIUM BLD-SCNC: 134 MMOL/L (ref 136–145)
SODIUM BLD-SCNC: 135 MMOL/L (ref 136–145)
SODIUM BLD-SCNC: 136 MMOL/L (ref 136–145)
SODIUM BLD-SCNC: 137 MMOL/L (ref 136–145)
SODIUM BLD-SCNC: 138 MMOL/L (ref 136–145)
SODIUM BLD-SCNC: 139 MMOL/L (ref 136–145)
SOURCE: NORMAL
SPECIFIC GRAVITY UA: 1.02 (ref 1–1.03)
SPECIFIC GRAVITY UA: 1.02 (ref 1–1.03)
SPECIFIC GRAVITY UA: 1.03 (ref 1–1.03)
TCO2 ARTERIAL: 19.5 MMOL/L
TCO2 ARTERIAL: 26.7 MMOL/L
TCO2 ARTERIAL: 37.9 MMOL/L
TCO2 ARTERIAL: 38.8 MMOL/L
TCO2 ARTERIAL: 43.9 MMOL/L
TCO2 ARTERIAL: 44.6 MMOL/L
TCO2 ARTERIAL: 48.9 MMOL/L
TCO2 CALC VENOUS: 33 MMOL/L
TCO2 CALC VENOUS: 42 MMOL/L
TCO2 CALC VENOUS: 44 MMOL/L
TOTAL CK: 166 U/L (ref 26–192)
TOTAL PROTEIN: 6.9 G/DL (ref 6.4–8.2)
TOTAL PROTEIN: 7.4 G/DL (ref 6.4–8.2)
TOTAL PROTEIN: 7.9 G/DL (ref 6.4–8.2)
TROPONIN: <0.01 NG/ML
URINE CULTURE, ROUTINE: ABNORMAL
URINE REFLEX TO CULTURE: ABNORMAL
URINE REFLEX TO CULTURE: NORMAL
URINE REFLEX TO CULTURE: YES
URINE TYPE: ABNORMAL
URINE TYPE: ABNORMAL
URINE TYPE: NORMAL
UROBILINOGEN, URINE: 0.2 E.U./DL
UROBILINOGEN, URINE: 0.2 E.U./DL
UROBILINOGEN, URINE: 1 E.U./DL
WBC # BLD: 10.5 K/UL (ref 4–11)
WBC # BLD: 13.2 K/UL (ref 4–11)
WBC # BLD: 13.4 K/UL (ref 4–11)
WBC # BLD: 16.2 K/UL (ref 4–11)
WBC # BLD: 4.6 K/UL (ref 4–11)
WBC # BLD: 5.4 K/UL (ref 4–11)
WBC # BLD: 6.3 K/UL (ref 4–11)
WBC # BLD: 6.7 K/UL (ref 4–11)
WBC # BLD: 6.7 K/UL (ref 4–11)
WBC # BLD: 7.1 K/UL (ref 4–11)
WBC # BLD: 7.5 K/UL (ref 4–11)
WBC # BLD: 8.2 K/UL (ref 4–11)
WBC # BLD: 8.7 K/UL (ref 4–11)
WBC # BLD: 9.7 K/UL (ref 4–11)
WBC UA: 1 /HPF (ref 0–5)
WBC UA: 41 /HPF (ref 0–5)

## 2020-01-01 PROCEDURE — 99231 SBSQ HOSP IP/OBS SF/LOW 25: CPT | Performed by: INTERNAL MEDICINE

## 2020-01-01 PROCEDURE — 6360000002 HC RX W HCPCS: Performed by: STUDENT IN AN ORGANIZED HEALTH CARE EDUCATION/TRAINING PROGRAM

## 2020-01-01 PROCEDURE — 80048 BASIC METABOLIC PNL TOTAL CA: CPT

## 2020-01-01 PROCEDURE — 6370000000 HC RX 637 (ALT 250 FOR IP): Performed by: HOSPITALIST

## 2020-01-01 PROCEDURE — 94660 CPAP INITIATION&MGMT: CPT

## 2020-01-01 PROCEDURE — 6370000000 HC RX 637 (ALT 250 FOR IP): Performed by: NURSE PRACTITIONER

## 2020-01-01 PROCEDURE — 80053 COMPREHEN METABOLIC PANEL: CPT

## 2020-01-01 PROCEDURE — 97166 OT EVAL MOD COMPLEX 45 MIN: CPT

## 2020-01-01 PROCEDURE — 2580000003 HC RX 258: Performed by: FAMILY MEDICINE

## 2020-01-01 PROCEDURE — 6370000000 HC RX 637 (ALT 250 FOR IP): Performed by: INTERNAL MEDICINE

## 2020-01-01 PROCEDURE — 85730 THROMBOPLASTIN TIME PARTIAL: CPT

## 2020-01-01 PROCEDURE — 85025 COMPLETE CBC W/AUTO DIFF WBC: CPT

## 2020-01-01 PROCEDURE — 83605 ASSAY OF LACTIC ACID: CPT

## 2020-01-01 PROCEDURE — 2700000000 HC OXYGEN THERAPY PER DAY

## 2020-01-01 PROCEDURE — 6360000002 HC RX W HCPCS: Performed by: INTERNAL MEDICINE

## 2020-01-01 PROCEDURE — 94010 BREATHING CAPACITY TEST: CPT

## 2020-01-01 PROCEDURE — 2580000003 HC RX 258: Performed by: INTERNAL MEDICINE

## 2020-01-01 PROCEDURE — 4040F PNEUMOC VAC/ADMIN/RCVD: CPT | Performed by: INTERNAL MEDICINE

## 2020-01-01 PROCEDURE — 83735 ASSAY OF MAGNESIUM: CPT

## 2020-01-01 PROCEDURE — 82803 BLOOD GASES ANY COMBINATION: CPT

## 2020-01-01 PROCEDURE — 96365 THER/PROPH/DIAG IV INF INIT: CPT

## 2020-01-01 PROCEDURE — 97530 THERAPEUTIC ACTIVITIES: CPT

## 2020-01-01 PROCEDURE — 94640 AIRWAY INHALATION TREATMENT: CPT

## 2020-01-01 PROCEDURE — 71045 X-RAY EXAM CHEST 1 VIEW: CPT

## 2020-01-01 PROCEDURE — 87077 CULTURE AEROBIC IDENTIFY: CPT

## 2020-01-01 PROCEDURE — 6370000000 HC RX 637 (ALT 250 FOR IP): Performed by: STUDENT IN AN ORGANIZED HEALTH CARE EDUCATION/TRAINING PROGRAM

## 2020-01-01 PROCEDURE — 1111F DSCHRG MED/CURRENT MED MERGE: CPT | Performed by: INTERNAL MEDICINE

## 2020-01-01 PROCEDURE — G8417 CALC BMI ABV UP PARAM F/U: HCPCS | Performed by: INTERNAL MEDICINE

## 2020-01-01 PROCEDURE — 84100 ASSAY OF PHOSPHORUS: CPT

## 2020-01-01 PROCEDURE — 94761 N-INVAS EAR/PLS OXIMETRY MLT: CPT

## 2020-01-01 PROCEDURE — 6360000004 HC RX CONTRAST MEDICATION: Performed by: EMERGENCY MEDICINE

## 2020-01-01 PROCEDURE — 6360000004 HC RX CONTRAST MEDICATION: Performed by: FAMILY MEDICINE

## 2020-01-01 PROCEDURE — 1123F ACP DISCUSS/DSCN MKR DOCD: CPT | Performed by: INTERNAL MEDICINE

## 2020-01-01 PROCEDURE — 5A1935Z RESPIRATORY VENTILATION, LESS THAN 24 CONSECUTIVE HOURS: ICD-10-PCS | Performed by: EMERGENCY MEDICINE

## 2020-01-01 PROCEDURE — 80307 DRUG TEST PRSMV CHEM ANLYZR: CPT

## 2020-01-01 PROCEDURE — 94010 BREATHING CAPACITY TEST: CPT | Performed by: INTERNAL MEDICINE

## 2020-01-01 PROCEDURE — 93010 ELECTROCARDIOGRAM REPORT: CPT | Performed by: INTERNAL MEDICINE

## 2020-01-01 PROCEDURE — 93005 ELECTROCARDIOGRAM TRACING: CPT | Performed by: STUDENT IN AN ORGANIZED HEALTH CARE EDUCATION/TRAINING PROGRAM

## 2020-01-01 PROCEDURE — 97116 GAIT TRAINING THERAPY: CPT | Performed by: PHYSICAL THERAPIST

## 2020-01-01 PROCEDURE — 94680 O2 UPTK RST&XERS DIR SIMPLE: CPT

## 2020-01-01 PROCEDURE — 2060000000 HC ICU INTERMEDIATE R&B

## 2020-01-01 PROCEDURE — 36600 WITHDRAWAL OF ARTERIAL BLOOD: CPT

## 2020-01-01 PROCEDURE — 71046 X-RAY EXAM CHEST 2 VIEWS: CPT

## 2020-01-01 PROCEDURE — 6370000000 HC RX 637 (ALT 250 FOR IP): Performed by: PHARMACIST

## 2020-01-01 PROCEDURE — 2580000003 HC RX 258: Performed by: STUDENT IN AN ORGANIZED HEALTH CARE EDUCATION/TRAINING PROGRAM

## 2020-01-01 PROCEDURE — 94762 N-INVAS EAR/PLS OXIMTRY CONT: CPT

## 2020-01-01 PROCEDURE — 80069 RENAL FUNCTION PANEL: CPT

## 2020-01-01 PROCEDURE — 99285 EMERGENCY DEPT VISIT HI MDM: CPT

## 2020-01-01 PROCEDURE — 6370000000 HC RX 637 (ALT 250 FOR IP): Performed by: FAMILY MEDICINE

## 2020-01-01 PROCEDURE — 36556 INSERT NON-TUNNEL CV CATH: CPT

## 2020-01-01 PROCEDURE — 36415 COLL VENOUS BLD VENIPUNCTURE: CPT

## 2020-01-01 PROCEDURE — 36569 INSJ PICC 5 YR+ W/O IMAGING: CPT

## 2020-01-01 PROCEDURE — 97116 GAIT TRAINING THERAPY: CPT

## 2020-01-01 PROCEDURE — 99291 CRITICAL CARE FIRST HOUR: CPT | Performed by: INTERNAL MEDICINE

## 2020-01-01 PROCEDURE — G8926 SPIRO NO PERF OR DOC: HCPCS | Performed by: INTERNAL MEDICINE

## 2020-01-01 PROCEDURE — 6360000002 HC RX W HCPCS: Performed by: NURSE PRACTITIONER

## 2020-01-01 PROCEDURE — 97530 THERAPEUTIC ACTIVITIES: CPT | Performed by: PHYSICAL THERAPIST

## 2020-01-01 PROCEDURE — 87040 BLOOD CULTURE FOR BACTERIA: CPT

## 2020-01-01 PROCEDURE — 97110 THERAPEUTIC EXERCISES: CPT | Performed by: PHYSICAL THERAPIST

## 2020-01-01 PROCEDURE — 87086 URINE CULTURE/COLONY COUNT: CPT

## 2020-01-01 PROCEDURE — 02HV33Z INSERTION OF INFUSION DEVICE INTO SUPERIOR VENA CAVA, PERCUTANEOUS APPROACH: ICD-10-PCS | Performed by: STUDENT IN AN ORGANIZED HEALTH CARE EDUCATION/TRAINING PROGRAM

## 2020-01-01 PROCEDURE — 94002 VENT MGMT INPAT INIT DAY: CPT

## 2020-01-01 PROCEDURE — 93005 ELECTROCARDIOGRAM TRACING: CPT | Performed by: EMERGENCY MEDICINE

## 2020-01-01 PROCEDURE — 2500000003 HC RX 250 WO HCPCS: Performed by: NURSE PRACTITIONER

## 2020-01-01 PROCEDURE — 81003 URINALYSIS AUTO W/O SCOPE: CPT

## 2020-01-01 PROCEDURE — 70450 CT HEAD/BRAIN W/O DYE: CPT

## 2020-01-01 PROCEDURE — 83880 ASSAY OF NATRIURETIC PEPTIDE: CPT

## 2020-01-01 PROCEDURE — 81001 URINALYSIS AUTO W/SCOPE: CPT

## 2020-01-01 PROCEDURE — 6360000002 HC RX W HCPCS: Performed by: FAMILY MEDICINE

## 2020-01-01 PROCEDURE — 2000000000 HC ICU R&B

## 2020-01-01 PROCEDURE — 94760 N-INVAS EAR/PLS OXIMETRY 1: CPT

## 2020-01-01 PROCEDURE — 99233 SBSQ HOSP IP/OBS HIGH 50: CPT | Performed by: INTERNAL MEDICINE

## 2020-01-01 PROCEDURE — 6370000000 HC RX 637 (ALT 250 FOR IP): Performed by: EMERGENCY MEDICINE

## 2020-01-01 PROCEDURE — 84484 ASSAY OF TROPONIN QUANT: CPT

## 2020-01-01 PROCEDURE — 85610 PROTHROMBIN TIME: CPT

## 2020-01-01 PROCEDURE — 82330 ASSAY OF CALCIUM: CPT

## 2020-01-01 PROCEDURE — 97162 PT EVAL MOD COMPLEX 30 MIN: CPT | Performed by: PHYSICAL THERAPIST

## 2020-01-01 PROCEDURE — 2500000003 HC RX 250 WO HCPCS

## 2020-01-01 PROCEDURE — 99291 CRITICAL CARE FIRST HOUR: CPT

## 2020-01-01 PROCEDURE — 6360000002 HC RX W HCPCS: Performed by: EMERGENCY MEDICINE

## 2020-01-01 PROCEDURE — 99223 1ST HOSP IP/OBS HIGH 75: CPT | Performed by: INTERNAL MEDICINE

## 2020-01-01 PROCEDURE — 2500000003 HC RX 250 WO HCPCS: Performed by: INTERNAL MEDICINE

## 2020-01-01 PROCEDURE — 2580000003 HC RX 258: Performed by: NURSE PRACTITIONER

## 2020-01-01 PROCEDURE — APPNB15 APP NON BILLABLE TIME 0-15 MINS: Performed by: NURSE PRACTITIONER

## 2020-01-01 PROCEDURE — 82550 ASSAY OF CK (CPK): CPT

## 2020-01-01 PROCEDURE — 96375 TX/PRO/DX INJ NEW DRUG ADDON: CPT

## 2020-01-01 PROCEDURE — G8400 PT W/DXA NO RESULTS DOC: HCPCS | Performed by: INTERNAL MEDICINE

## 2020-01-01 PROCEDURE — G0480 DRUG TEST DEF 1-7 CLASSES: HCPCS

## 2020-01-01 PROCEDURE — 83036 HEMOGLOBIN GLYCOSYLATED A1C: CPT

## 2020-01-01 PROCEDURE — 87186 SC STD MICRODIL/AGAR DIL: CPT

## 2020-01-01 PROCEDURE — G8427 DOCREV CUR MEDS BY ELIG CLIN: HCPCS | Performed by: INTERNAL MEDICINE

## 2020-01-01 PROCEDURE — 2500000003 HC RX 250 WO HCPCS: Performed by: EMERGENCY MEDICINE

## 2020-01-01 PROCEDURE — 99214 OFFICE O/P EST MOD 30 MIN: CPT | Performed by: INTERNAL MEDICINE

## 2020-01-01 PROCEDURE — 97535 SELF CARE MNGMENT TRAINING: CPT

## 2020-01-01 PROCEDURE — 99232 SBSQ HOSP IP/OBS MODERATE 35: CPT | Performed by: INTERNAL MEDICINE

## 2020-01-01 PROCEDURE — 1090F PRES/ABSN URINE INCON ASSESS: CPT | Performed by: INTERNAL MEDICINE

## 2020-01-01 PROCEDURE — U0002 COVID-19 LAB TEST NON-CDC: HCPCS

## 2020-01-01 PROCEDURE — 96372 THER/PROPH/DIAG INJ SC/IM: CPT

## 2020-01-01 PROCEDURE — 3023F SPIROM DOC REV: CPT | Performed by: INTERNAL MEDICINE

## 2020-01-01 PROCEDURE — 71260 CT THORAX DX C+: CPT

## 2020-01-01 PROCEDURE — 36592 COLLECT BLOOD FROM PICC: CPT

## 2020-01-01 PROCEDURE — 85379 FIBRIN DEGRADATION QUANT: CPT

## 2020-01-01 PROCEDURE — 0BH17EZ INSERTION OF ENDOTRACHEAL AIRWAY INTO TRACHEA, VIA NATURAL OR ARTIFICIAL OPENING: ICD-10-PCS | Performed by: EMERGENCY MEDICINE

## 2020-01-01 PROCEDURE — 87150 DNA/RNA AMPLIFIED PROBE: CPT

## 2020-01-01 PROCEDURE — 1036F TOBACCO NON-USER: CPT | Performed by: INTERNAL MEDICINE

## 2020-01-01 PROCEDURE — U0003 INFECTIOUS AGENT DETECTION BY NUCLEIC ACID (DNA OR RNA); SEVERE ACUTE RESPIRATORY SYNDROME CORONAVIRUS 2 (SARS-COV-2) (CORONAVIRUS DISEASE [COVID-19]), AMPLIFIED PROBE TECHNIQUE, MAKING USE OF HIGH THROUGHPUT TECHNOLOGIES AS DESCRIBED BY CMS-2020-01-R: HCPCS

## 2020-01-01 PROCEDURE — C9113 INJ PANTOPRAZOLE SODIUM, VIA: HCPCS | Performed by: INTERNAL MEDICINE

## 2020-01-01 PROCEDURE — 96367 TX/PROPH/DG ADDL SEQ IV INF: CPT

## 2020-01-01 PROCEDURE — 99292 CRITICAL CARE ADDL 30 MIN: CPT

## 2020-01-01 PROCEDURE — 02HV33Z INSERTION OF INFUSION DEVICE INTO SUPERIOR VENA CAVA, PERCUTANEOUS APPROACH: ICD-10-PCS | Performed by: EMERGENCY MEDICINE

## 2020-01-01 PROCEDURE — 97161 PT EVAL LOW COMPLEX 20 MIN: CPT

## 2020-01-01 PROCEDURE — 84145 PROCALCITONIN (PCT): CPT

## 2020-01-01 PROCEDURE — G8484 FLU IMMUNIZE NO ADMIN: HCPCS | Performed by: INTERNAL MEDICINE

## 2020-01-01 PROCEDURE — 93005 ELECTROCARDIOGRAM TRACING: CPT | Performed by: FAMILY MEDICINE

## 2020-01-01 PROCEDURE — 2580000003 HC RX 258: Performed by: EMERGENCY MEDICINE

## 2020-01-01 PROCEDURE — 80076 HEPATIC FUNCTION PANEL: CPT

## 2020-01-01 RX ORDER — PROMETHAZINE HYDROCHLORIDE 25 MG/1
12.5 TABLET ORAL EVERY 6 HOURS PRN
Status: DISCONTINUED | OUTPATIENT
Start: 2020-01-01 | End: 2020-01-01

## 2020-01-01 RX ORDER — MAGNESIUM SULFATE IN WATER 40 MG/ML
2 INJECTION, SOLUTION INTRAVENOUS ONCE
Status: COMPLETED | OUTPATIENT
Start: 2020-01-01 | End: 2020-01-01

## 2020-01-01 RX ORDER — PREDNISONE 20 MG/1
40 TABLET ORAL DAILY
Status: DISCONTINUED | OUTPATIENT
Start: 2020-10-04 | End: 2020-01-01 | Stop reason: HOSPADM

## 2020-01-01 RX ORDER — PANTOPRAZOLE SODIUM 40 MG/10ML
40 INJECTION, POWDER, LYOPHILIZED, FOR SOLUTION INTRAVENOUS 2 TIMES DAILY
Status: DISCONTINUED | OUTPATIENT
Start: 2020-01-01 | End: 2020-01-01 | Stop reason: HOSPADM

## 2020-01-01 RX ORDER — SODIUM CHLORIDE 0.9 % (FLUSH) 0.9 %
10 SYRINGE (ML) INJECTION EVERY 12 HOURS SCHEDULED
Status: DISCONTINUED | OUTPATIENT
Start: 2020-01-01 | End: 2020-01-01 | Stop reason: HOSPADM

## 2020-01-01 RX ORDER — MEGESTROL ACETATE 40 MG/1
80 TABLET ORAL 2 TIMES DAILY
Status: ON HOLD | COMMUNITY
Start: 2020-01-01 | End: 2020-01-01

## 2020-01-01 RX ORDER — METHYLPREDNISOLONE SODIUM SUCCINATE 40 MG/ML
40 INJECTION, POWDER, LYOPHILIZED, FOR SOLUTION INTRAMUSCULAR; INTRAVENOUS EVERY 6 HOURS
Status: DISCONTINUED | OUTPATIENT
Start: 2020-01-01 | End: 2020-01-01

## 2020-01-01 RX ORDER — PROMETHAZINE HYDROCHLORIDE 25 MG/1
12.5 TABLET ORAL EVERY 6 HOURS PRN
Status: DISCONTINUED | OUTPATIENT
Start: 2020-01-01 | End: 2020-01-01 | Stop reason: HOSPADM

## 2020-01-01 RX ORDER — SODIUM CHLORIDE 0.9 % (FLUSH) 0.9 %
10 SYRINGE (ML) INJECTION PRN
Status: DISCONTINUED | OUTPATIENT
Start: 2020-01-01 | End: 2020-01-01 | Stop reason: HOSPADM

## 2020-01-01 RX ORDER — DEXTROSE MONOHYDRATE 25 G/50ML
12.5 INJECTION, SOLUTION INTRAVENOUS PRN
Status: DISCONTINUED | OUTPATIENT
Start: 2020-01-01 | End: 2020-01-01 | Stop reason: SDUPTHER

## 2020-01-01 RX ORDER — PROPOFOL 10 MG/ML
12 INJECTION, EMULSION INTRAVENOUS CONTINUOUS PRN
Status: DISCONTINUED | OUTPATIENT
Start: 2020-01-01 | End: 2020-01-01 | Stop reason: HOSPADM

## 2020-01-01 RX ORDER — CALCIUM GLUCONATE 20 MG/ML
1 INJECTION, SOLUTION INTRAVENOUS ONCE
Status: COMPLETED | OUTPATIENT
Start: 2020-01-01 | End: 2020-01-01

## 2020-01-01 RX ORDER — TRIAMCINOLONE ACETONIDE 1 MG/G
1 CREAM TOPICAL 2 TIMES DAILY PRN
COMMUNITY
Start: 2020-01-01

## 2020-01-01 RX ORDER — NICOTINE POLACRILEX 4 MG
15 LOZENGE BUCCAL PRN
Status: DISCONTINUED | OUTPATIENT
Start: 2020-01-01 | End: 2020-01-01 | Stop reason: HOSPADM

## 2020-01-01 RX ORDER — ALBUTEROL SULFATE 2.5 MG/3ML
5 SOLUTION RESPIRATORY (INHALATION) ONCE
Status: COMPLETED | OUTPATIENT
Start: 2020-01-01 | End: 2020-01-01

## 2020-01-01 RX ORDER — IPRATROPIUM BROMIDE AND ALBUTEROL SULFATE 2.5; .5 MG/3ML; MG/3ML
1 SOLUTION RESPIRATORY (INHALATION) EVERY 4 HOURS
Qty: 360 ML | Refills: 1 | Status: SHIPPED | OUTPATIENT
Start: 2020-01-01

## 2020-01-01 RX ORDER — CHLORHEXIDINE GLUCONATE 0.12 MG/ML
15 RINSE ORAL 2 TIMES DAILY
Status: DISCONTINUED | OUTPATIENT
Start: 2020-01-01 | End: 2020-01-01

## 2020-01-01 RX ORDER — IPRATROPIUM BROMIDE AND ALBUTEROL SULFATE 2.5; .5 MG/3ML; MG/3ML
1 SOLUTION RESPIRATORY (INHALATION)
Status: DISCONTINUED | OUTPATIENT
Start: 2020-01-01 | End: 2020-01-01 | Stop reason: HOSPADM

## 2020-01-01 RX ORDER — MIDAZOLAM HYDROCHLORIDE 1 MG/ML
5 INJECTION INTRAMUSCULAR; INTRAVENOUS ONCE
Status: DISCONTINUED | OUTPATIENT
Start: 2020-01-01 | End: 2020-01-01

## 2020-01-01 RX ORDER — HEPARIN SODIUM 5000 [USP'U]/ML
5000 INJECTION, SOLUTION INTRAVENOUS; SUBCUTANEOUS EVERY 8 HOURS SCHEDULED
Status: DISCONTINUED | OUTPATIENT
Start: 2020-01-01 | End: 2020-01-01 | Stop reason: HOSPADM

## 2020-01-01 RX ORDER — CETIRIZINE HYDROCHLORIDE 10 MG/1
5 TABLET ORAL DAILY PRN
Status: DISCONTINUED | OUTPATIENT
Start: 2020-01-01 | End: 2020-01-01 | Stop reason: HOSPADM

## 2020-01-01 RX ORDER — DEXTROSE MONOHYDRATE 50 MG/ML
100 INJECTION, SOLUTION INTRAVENOUS PRN
Status: DISCONTINUED | OUTPATIENT
Start: 2020-01-01 | End: 2020-01-01 | Stop reason: HOSPADM

## 2020-01-01 RX ORDER — INSULIN GLARGINE 100 [IU]/ML
25 INJECTION, SOLUTION SUBCUTANEOUS 2 TIMES DAILY
Status: DISCONTINUED | OUTPATIENT
Start: 2020-01-01 | End: 2020-01-01

## 2020-01-01 RX ORDER — INSULIN GLARGINE 100 [IU]/ML
5 INJECTION, SOLUTION SUBCUTANEOUS DAILY
Status: DISCONTINUED | OUTPATIENT
Start: 2020-01-01 | End: 2020-01-01

## 2020-01-01 RX ORDER — HEPARIN SODIUM 1000 [USP'U]/ML
5600 INJECTION, SOLUTION INTRAVENOUS; SUBCUTANEOUS ONCE
Status: COMPLETED | OUTPATIENT
Start: 2020-01-01 | End: 2020-01-01

## 2020-01-01 RX ORDER — ROSUVASTATIN CALCIUM 10 MG/1
5 TABLET, COATED ORAL NIGHTLY
Status: DISCONTINUED | OUTPATIENT
Start: 2020-01-01 | End: 2020-01-01 | Stop reason: HOSPADM

## 2020-01-01 RX ORDER — ACETAMINOPHEN 325 MG/1
650 TABLET ORAL EVERY 6 HOURS PRN
COMMUNITY

## 2020-01-01 RX ORDER — MAGNESIUM SULFATE IN WATER 40 MG/ML
4 INJECTION, SOLUTION INTRAVENOUS ONCE
Status: COMPLETED | OUTPATIENT
Start: 2020-01-01 | End: 2020-01-01

## 2020-01-01 RX ORDER — BENZONATATE 100 MG/1
100 CAPSULE ORAL 3 TIMES DAILY PRN
Status: DISCONTINUED | OUTPATIENT
Start: 2020-01-01 | End: 2020-01-01

## 2020-01-01 RX ORDER — INSULIN GLARGINE 100 [IU]/ML
20 INJECTION, SOLUTION SUBCUTANEOUS 2 TIMES DAILY
Status: DISCONTINUED | OUTPATIENT
Start: 2020-01-01 | End: 2020-01-01

## 2020-01-01 RX ORDER — INSULIN GLARGINE 100 [IU]/ML
12 INJECTION, SOLUTION SUBCUTANEOUS DAILY
Status: DISCONTINUED | OUTPATIENT
Start: 2020-01-01 | End: 2020-01-01

## 2020-01-01 RX ORDER — ACETAMINOPHEN 650 MG/1
650 SUPPOSITORY RECTAL EVERY 6 HOURS PRN
Status: DISCONTINUED | OUTPATIENT
Start: 2020-01-01 | End: 2020-01-01 | Stop reason: HOSPADM

## 2020-01-01 RX ORDER — BUDESONIDE AND FORMOTEROL FUMARATE DIHYDRATE 160; 4.5 UG/1; UG/1
2 AEROSOL RESPIRATORY (INHALATION) 2 TIMES DAILY
Status: DISCONTINUED | OUTPATIENT
Start: 2020-01-01 | End: 2020-01-01 | Stop reason: HOSPADM

## 2020-01-01 RX ORDER — ALBUTEROL SULFATE 2.5 MG/3ML
2.5 SOLUTION RESPIRATORY (INHALATION) EVERY 6 HOURS PRN
Status: DISCONTINUED | OUTPATIENT
Start: 2020-01-01 | End: 2020-01-01 | Stop reason: HOSPADM

## 2020-01-01 RX ORDER — HEPARIN SODIUM 1000 [USP'U]/ML
40 INJECTION, SOLUTION INTRAVENOUS; SUBCUTANEOUS PRN
Status: DISCONTINUED | OUTPATIENT
Start: 2020-01-01 | End: 2020-01-01

## 2020-01-01 RX ORDER — SODIUM CHLORIDE, SODIUM LACTATE, POTASSIUM CHLORIDE, CALCIUM CHLORIDE 600; 310; 30; 20 MG/100ML; MG/100ML; MG/100ML; MG/100ML
INJECTION, SOLUTION INTRAVENOUS CONTINUOUS
Status: DISCONTINUED | OUTPATIENT
Start: 2020-01-01 | End: 2020-01-01

## 2020-01-01 RX ORDER — PREDNISONE 20 MG/1
40 TABLET ORAL DAILY
Qty: 6 TABLET | Refills: 0 | Status: SHIPPED | OUTPATIENT
Start: 2020-01-01 | End: 2020-01-01

## 2020-01-01 RX ORDER — SODIUM CHLORIDE, SODIUM LACTATE, POTASSIUM CHLORIDE, CALCIUM CHLORIDE 600; 310; 30; 20 MG/100ML; MG/100ML; MG/100ML; MG/100ML
30 INJECTION, SOLUTION INTRAVENOUS ONCE
Status: COMPLETED | OUTPATIENT
Start: 2020-01-01 | End: 2020-01-01

## 2020-01-01 RX ORDER — FLUTICASONE PROPIONATE 44 UG/1
1 AEROSOL, METERED RESPIRATORY (INHALATION) 2 TIMES DAILY
Status: DISCONTINUED | OUTPATIENT
Start: 2020-01-01 | End: 2020-01-01

## 2020-01-01 RX ORDER — METHYLPREDNISOLONE SODIUM SUCCINATE 125 MG/2ML
125 INJECTION, POWDER, LYOPHILIZED, FOR SOLUTION INTRAMUSCULAR; INTRAVENOUS ONCE
Status: COMPLETED | OUTPATIENT
Start: 2020-01-01 | End: 2020-01-01

## 2020-01-01 RX ORDER — POLYETHYLENE GLYCOL 3350 17 G/17G
17 POWDER, FOR SOLUTION ORAL DAILY PRN
Status: DISCONTINUED | OUTPATIENT
Start: 2020-01-01 | End: 2020-01-01 | Stop reason: HOSPADM

## 2020-01-01 RX ORDER — MAGNESIUM SULFATE IN WATER 40 MG/ML
2 INJECTION, SOLUTION INTRAVENOUS ONCE
Status: DISCONTINUED | OUTPATIENT
Start: 2020-01-01 | End: 2020-01-01

## 2020-01-01 RX ORDER — INSULIN GLARGINE 100 [IU]/ML
25 INJECTION, SOLUTION SUBCUTANEOUS NIGHTLY
Status: DISCONTINUED | OUTPATIENT
Start: 2020-01-01 | End: 2020-01-01

## 2020-01-01 RX ORDER — DEXTROSE MONOHYDRATE 25 G/50ML
12.5 INJECTION, SOLUTION INTRAVENOUS PRN
Status: DISCONTINUED | OUTPATIENT
Start: 2020-01-01 | End: 2020-01-01 | Stop reason: HOSPADM

## 2020-01-01 RX ORDER — ONDANSETRON 2 MG/ML
4 INJECTION INTRAMUSCULAR; INTRAVENOUS EVERY 4 HOURS PRN
Status: DISCONTINUED | OUTPATIENT
Start: 2020-01-01 | End: 2020-01-01 | Stop reason: HOSPADM

## 2020-01-01 RX ORDER — 0.9 % SODIUM CHLORIDE 0.9 %
1000 INTRAVENOUS SOLUTION INTRAVENOUS ONCE
Status: COMPLETED | OUTPATIENT
Start: 2020-01-01 | End: 2020-01-01

## 2020-01-01 RX ORDER — SODIUM CHLORIDE 9 MG/ML
10 INJECTION INTRAVENOUS DAILY
Status: DISCONTINUED | OUTPATIENT
Start: 2020-01-01 | End: 2020-01-01 | Stop reason: ALTCHOICE

## 2020-01-01 RX ORDER — METHYLPREDNISOLONE SODIUM SUCCINATE 125 MG/2ML
60 INJECTION, POWDER, LYOPHILIZED, FOR SOLUTION INTRAMUSCULAR; INTRAVENOUS EVERY 6 HOURS
Status: DISCONTINUED | OUTPATIENT
Start: 2020-01-01 | End: 2020-01-01

## 2020-01-01 RX ORDER — DEXAMETHASONE SODIUM PHOSPHATE 4 MG/ML
10 INJECTION, SOLUTION INTRA-ARTICULAR; INTRALESIONAL; INTRAMUSCULAR; INTRAVENOUS; SOFT TISSUE ONCE
Status: COMPLETED | OUTPATIENT
Start: 2020-01-01 | End: 2020-01-01

## 2020-01-01 RX ORDER — PROPRANOLOL HYDROCHLORIDE 80 MG/1
80 CAPSULE, EXTENDED RELEASE ORAL DAILY
COMMUNITY
Start: 2020-01-01

## 2020-01-01 RX ORDER — HYDROXYZINE HYDROCHLORIDE 10 MG/1
25 TABLET, FILM COATED ORAL 3 TIMES DAILY PRN
Status: DISCONTINUED | OUTPATIENT
Start: 2020-01-01 | End: 2020-01-01

## 2020-01-01 RX ORDER — NALOXONE HYDROCHLORIDE 0.4 MG/ML
0.4 INJECTION, SOLUTION INTRAMUSCULAR; INTRAVENOUS; SUBCUTANEOUS ONCE
Status: DISCONTINUED | OUTPATIENT
Start: 2020-01-01 | End: 2020-01-01

## 2020-01-01 RX ORDER — DIPHENHYDRAMINE HCL 25 MG
12.5 TABLET ORAL NIGHTLY PRN
Status: DISCONTINUED | OUTPATIENT
Start: 2020-01-01 | End: 2020-01-01 | Stop reason: HOSPADM

## 2020-01-01 RX ORDER — BUDESONIDE AND FORMOTEROL FUMARATE DIHYDRATE 160; 4.5 UG/1; UG/1
2 AEROSOL RESPIRATORY (INHALATION) 2 TIMES DAILY
Qty: 1 INHALER | Refills: 3 | Status: SHIPPED | OUTPATIENT
Start: 2020-01-01 | End: 2020-01-01 | Stop reason: HOSPADM

## 2020-01-01 RX ORDER — METHYLPREDNISOLONE SODIUM SUCCINATE 40 MG/ML
40 INJECTION, POWDER, LYOPHILIZED, FOR SOLUTION INTRAMUSCULAR; INTRAVENOUS EVERY 12 HOURS
Status: DISCONTINUED | OUTPATIENT
Start: 2020-01-01 | End: 2020-01-01

## 2020-01-01 RX ORDER — CHLORHEXIDINE GLUCONATE 0.12 MG/ML
15 RINSE ORAL 2 TIMES DAILY
Status: DISCONTINUED | OUTPATIENT
Start: 2020-01-01 | End: 2020-01-01 | Stop reason: HOSPADM

## 2020-01-01 RX ORDER — METHYLPREDNISOLONE SODIUM SUCCINATE 40 MG/ML
40 INJECTION, POWDER, LYOPHILIZED, FOR SOLUTION INTRAMUSCULAR; INTRAVENOUS EVERY 6 HOURS
Status: DISCONTINUED | OUTPATIENT
Start: 2020-01-01 | End: 2020-01-01 | Stop reason: HOSPADM

## 2020-01-01 RX ORDER — PREDNISONE 20 MG/1
40 TABLET ORAL DAILY
Qty: 8 TABLET | Refills: 0 | Status: SHIPPED | OUTPATIENT
Start: 2020-01-01 | End: 2020-10-03

## 2020-01-01 RX ORDER — ONDANSETRON 2 MG/ML
4 INJECTION INTRAMUSCULAR; INTRAVENOUS EVERY 6 HOURS PRN
Status: DISCONTINUED | OUTPATIENT
Start: 2020-01-01 | End: 2020-01-01 | Stop reason: HOSPADM

## 2020-01-01 RX ORDER — DOXYCYCLINE HYCLATE 100 MG
100 TABLET ORAL ONCE
Status: COMPLETED | OUTPATIENT
Start: 2020-01-01 | End: 2020-01-01

## 2020-01-01 RX ORDER — ACETAMINOPHEN 325 MG/1
650 TABLET ORAL EVERY 6 HOURS PRN
Status: DISCONTINUED | OUTPATIENT
Start: 2020-01-01 | End: 2020-01-01 | Stop reason: HOSPADM

## 2020-01-01 RX ORDER — PROPRANOLOL HYDROCHLORIDE 80 MG/1
80 CAPSULE, EXTENDED RELEASE ORAL DAILY
Status: DISCONTINUED | OUTPATIENT
Start: 2020-01-01 | End: 2020-01-01 | Stop reason: HOSPADM

## 2020-01-01 RX ORDER — FLUTICASONE PROPIONATE 44 UG/1
1 AEROSOL, METERED RESPIRATORY (INHALATION) 2 TIMES DAILY
Qty: 1 INHALER | Refills: 3 | Status: SHIPPED | OUTPATIENT
Start: 2020-01-01

## 2020-01-01 RX ORDER — DEXTROSE MONOHYDRATE 50 MG/ML
100 INJECTION, SOLUTION INTRAVENOUS PRN
Status: DISCONTINUED | OUTPATIENT
Start: 2020-01-01 | End: 2020-01-01 | Stop reason: SDUPTHER

## 2020-01-01 RX ORDER — MEDROXYPROGESTERONE ACETATE 150 MG/ML
150 INJECTION, SUSPENSION INTRAMUSCULAR
COMMUNITY

## 2020-01-01 RX ORDER — HEPARIN SODIUM 10000 [USP'U]/100ML
12.5 INJECTION, SOLUTION INTRAVENOUS CONTINUOUS
Status: DISCONTINUED | OUTPATIENT
Start: 2020-01-01 | End: 2020-01-01 | Stop reason: HOSPADM

## 2020-01-01 RX ORDER — HYDROCHLOROTHIAZIDE 25 MG/1
25 TABLET ORAL DAILY
COMMUNITY

## 2020-01-01 RX ORDER — HYDROCODONE BITARTRATE AND ACETAMINOPHEN 5; 325 MG/1; MG/1
0.5 TABLET ORAL EVERY 6 HOURS PRN
COMMUNITY
Start: 2020-01-01 | End: 2020-10-14

## 2020-01-01 RX ORDER — PREDNISONE 20 MG/1
40 TABLET ORAL DAILY
Status: DISCONTINUED | OUTPATIENT
Start: 2020-01-01 | End: 2020-01-01 | Stop reason: HOSPADM

## 2020-01-01 RX ORDER — INSULIN GLARGINE 100 [IU]/ML
10 INJECTION, SOLUTION SUBCUTANEOUS DAILY
Status: DISCONTINUED | OUTPATIENT
Start: 2020-01-01 | End: 2020-01-01 | Stop reason: HOSPADM

## 2020-01-01 RX ORDER — PROPOFOL 10 MG/ML
10 INJECTION, EMULSION INTRAVENOUS ONCE
Status: DISCONTINUED | OUTPATIENT
Start: 2020-01-01 | End: 2020-01-01

## 2020-01-01 RX ORDER — PROPOFOL 10 MG/ML
10 INJECTION, EMULSION INTRAVENOUS
Status: DISCONTINUED | OUTPATIENT
Start: 2020-01-01 | End: 2020-01-01 | Stop reason: HOSPADM

## 2020-01-01 RX ORDER — SODIUM CHLORIDE 9 MG/ML
INJECTION, SOLUTION INTRAVENOUS CONTINUOUS
Status: DISCONTINUED | OUTPATIENT
Start: 2020-01-01 | End: 2020-01-01

## 2020-01-01 RX ORDER — INSULIN GLARGINE 100 [IU]/ML
22 INJECTION, SOLUTION SUBCUTANEOUS 2 TIMES DAILY
Status: DISCONTINUED | OUTPATIENT
Start: 2020-01-01 | End: 2020-01-01

## 2020-01-01 RX ORDER — MEGESTROL ACETATE 40 MG/1
40 TABLET ORAL DAILY
COMMUNITY
End: 2020-01-01

## 2020-01-01 RX ORDER — IPRATROPIUM BROMIDE AND ALBUTEROL SULFATE 2.5; .5 MG/3ML; MG/3ML
1 SOLUTION RESPIRATORY (INHALATION) ONCE
Status: COMPLETED | OUTPATIENT
Start: 2020-01-01 | End: 2020-01-01

## 2020-01-01 RX ORDER — DOXYCYCLINE HYCLATE 100 MG
100 TABLET ORAL EVERY 12 HOURS SCHEDULED
Status: DISCONTINUED | OUTPATIENT
Start: 2020-01-01 | End: 2020-01-01

## 2020-01-01 RX ORDER — ONDANSETRON 2 MG/ML
4 INJECTION INTRAMUSCULAR; INTRAVENOUS EVERY 6 HOURS PRN
Status: DISCONTINUED | OUTPATIENT
Start: 2020-01-01 | End: 2020-01-01

## 2020-01-01 RX ORDER — HYDROCHLOROTHIAZIDE 25 MG/1
25 TABLET ORAL DAILY
Status: DISCONTINUED | OUTPATIENT
Start: 2020-01-01 | End: 2020-01-01 | Stop reason: HOSPADM

## 2020-01-01 RX ORDER — INSULIN GLARGINE 100 [IU]/ML
10 INJECTION, SOLUTION SUBCUTANEOUS DAILY
Status: DISCONTINUED | OUTPATIENT
Start: 2020-01-01 | End: 2020-01-01

## 2020-01-01 RX ORDER — ACETAMINOPHEN 650 MG/1
650 SUPPOSITORY RECTAL EVERY 4 HOURS PRN
Status: DISCONTINUED | OUTPATIENT
Start: 2020-01-01 | End: 2020-01-01 | Stop reason: HOSPADM

## 2020-01-01 RX ORDER — NICOTINE POLACRILEX 4 MG
15 LOZENGE BUCCAL PRN
Status: DISCONTINUED | OUTPATIENT
Start: 2020-01-01 | End: 2020-01-01 | Stop reason: SDUPTHER

## 2020-01-01 RX ORDER — ACETAMINOPHEN 325 MG/1
650 TABLET ORAL EVERY 4 HOURS PRN
Status: DISCONTINUED | OUTPATIENT
Start: 2020-01-01 | End: 2020-01-01 | Stop reason: HOSPADM

## 2020-01-01 RX ORDER — HYDROCHLOROTHIAZIDE 25 MG/1
25 TABLET ORAL DAILY
Status: DISCONTINUED | OUTPATIENT
Start: 2020-01-01 | End: 2020-01-01

## 2020-01-01 RX ORDER — MAGNESIUM SULFATE IN WATER 40 MG/ML
4 INJECTION, SOLUTION INTRAVENOUS ONCE
Status: DISCONTINUED | OUTPATIENT
Start: 2020-01-01 | End: 2020-01-01 | Stop reason: HOSPADM

## 2020-01-01 RX ORDER — LATANOPROST 50 UG/ML
1 SOLUTION/ DROPS OPHTHALMIC NIGHTLY
Status: DISCONTINUED | OUTPATIENT
Start: 2020-01-01 | End: 2020-01-01

## 2020-01-01 RX ORDER — PREDNISONE 20 MG/1
40 TABLET ORAL DAILY
Status: COMPLETED | OUTPATIENT
Start: 2020-01-01 | End: 2020-01-01

## 2020-01-01 RX ORDER — INSULIN ASPART 100 [IU]/ML
16 INJECTION, SOLUTION INTRAVENOUS; SUBCUTANEOUS
COMMUNITY
Start: 2020-01-01

## 2020-01-01 RX ORDER — LORAZEPAM 2 MG/ML
1 INJECTION INTRAMUSCULAR ONCE
Status: COMPLETED | OUTPATIENT
Start: 2020-01-01 | End: 2020-01-01

## 2020-01-01 RX ORDER — ALBUTEROL SULFATE 2.5 MG/3ML
7.5 SOLUTION RESPIRATORY (INHALATION) EVERY 6 HOURS PRN
Status: DISCONTINUED | OUTPATIENT
Start: 2020-01-01 | End: 2020-01-01

## 2020-01-01 RX ORDER — PANTOPRAZOLE SODIUM 40 MG/10ML
40 INJECTION, POWDER, LYOPHILIZED, FOR SOLUTION INTRAVENOUS DAILY
Status: DISCONTINUED | OUTPATIENT
Start: 2020-01-01 | End: 2020-01-01 | Stop reason: ALTCHOICE

## 2020-01-01 RX ORDER — DOXYCYCLINE 100 MG/1
100 TABLET ORAL 2 TIMES DAILY
Qty: 6 TABLET | Refills: 0 | Status: SHIPPED | OUTPATIENT
Start: 2020-01-01 | End: 2020-01-01

## 2020-01-01 RX ORDER — IPRATROPIUM BROMIDE AND ALBUTEROL SULFATE 2.5; .5 MG/3ML; MG/3ML
1 SOLUTION RESPIRATORY (INHALATION) EVERY 4 HOURS PRN
Status: DISCONTINUED | OUTPATIENT
Start: 2020-01-01 | End: 2020-01-01 | Stop reason: HOSPADM

## 2020-01-01 RX ORDER — ALBUTEROL SULFATE 90 UG/1
2 AEROSOL, METERED RESPIRATORY (INHALATION) EVERY 6 HOURS PRN
COMMUNITY

## 2020-01-01 RX ORDER — BUDESONIDE 0.25 MG/2ML
250 INHALANT ORAL 2 TIMES DAILY
Qty: 60 AMPULE | Refills: 5 | Status: SHIPPED | OUTPATIENT
Start: 2020-01-01 | End: 2021-10-01

## 2020-01-01 RX ORDER — SODIUM CHLORIDE 9 MG/ML
10 INJECTION INTRAVENOUS 2 TIMES DAILY
Status: DISCONTINUED | OUTPATIENT
Start: 2020-01-01 | End: 2020-01-01 | Stop reason: HOSPADM

## 2020-01-01 RX ORDER — OLANZAPINE 10 MG/1
10 INJECTION, POWDER, LYOPHILIZED, FOR SOLUTION INTRAMUSCULAR ONCE
Status: DISCONTINUED | OUTPATIENT
Start: 2020-01-01 | End: 2020-01-01

## 2020-01-01 RX ORDER — POLYETHYLENE GLYCOL 3350 17 G/17G
17 POWDER, FOR SOLUTION ORAL DAILY
Status: DISCONTINUED | OUTPATIENT
Start: 2020-01-01 | End: 2020-01-01 | Stop reason: HOSPADM

## 2020-01-01 RX ORDER — EPINEPHRINE 0.1 MG/ML
SYRINGE (ML) INJECTION DAILY PRN
Status: COMPLETED | OUTPATIENT
Start: 2020-01-01 | End: 2020-01-01

## 2020-01-01 RX ORDER — IPRATROPIUM BROMIDE AND ALBUTEROL SULFATE 2.5; .5 MG/3ML; MG/3ML
3 SOLUTION RESPIRATORY (INHALATION) ONCE
Status: COMPLETED | OUTPATIENT
Start: 2020-01-01 | End: 2020-01-01

## 2020-01-01 RX ORDER — HEPARIN SODIUM 1000 [USP'U]/ML
80 INJECTION, SOLUTION INTRAVENOUS; SUBCUTANEOUS PRN
Status: DISCONTINUED | OUTPATIENT
Start: 2020-01-01 | End: 2020-01-01

## 2020-01-01 RX ORDER — INSULIN GLARGINE 100 [IU]/ML
28 INJECTION, SOLUTION SUBCUTANEOUS 2 TIMES DAILY
Status: DISCONTINUED | OUTPATIENT
Start: 2020-01-01 | End: 2020-01-01 | Stop reason: HOSPADM

## 2020-01-01 RX ADMIN — INSULIN LISPRO 20 UNITS: 100 INJECTION, SOLUTION INTRAVENOUS; SUBCUTANEOUS at 12:55

## 2020-01-01 RX ADMIN — HEPARIN SODIUM 5000 UNITS: 5000 INJECTION INTRAVENOUS; SUBCUTANEOUS at 21:51

## 2020-01-01 RX ADMIN — IPRATROPIUM BROMIDE AND ALBUTEROL SULFATE 1 AMPULE: .5; 3 SOLUTION RESPIRATORY (INHALATION) at 21:15

## 2020-01-01 RX ADMIN — ACETAMINOPHEN 650 MG: 325 TABLET ORAL at 03:09

## 2020-01-01 RX ADMIN — INSULIN LISPRO 19 UNITS: 100 INJECTION, SOLUTION INTRAVENOUS; SUBCUTANEOUS at 22:04

## 2020-01-01 RX ADMIN — METHYLPREDNISOLONE SODIUM SUCCINATE 40 MG: 40 INJECTION, POWDER, FOR SOLUTION INTRAMUSCULAR; INTRAVENOUS at 20:17

## 2020-01-01 RX ADMIN — ENOXAPARIN SODIUM 40 MG: 40 INJECTION SUBCUTANEOUS at 20:17

## 2020-01-01 RX ADMIN — BUDESONIDE AND FORMOTEROL FUMARATE DIHYDRATE 2 PUFF: 160; 4.5 AEROSOL RESPIRATORY (INHALATION) at 08:08

## 2020-01-01 RX ADMIN — INSULIN LISPRO 6 UNITS: 100 INJECTION, SOLUTION INTRAVENOUS; SUBCUTANEOUS at 21:53

## 2020-01-01 RX ADMIN — IPRATROPIUM BROMIDE AND ALBUTEROL SULFATE 1 AMPULE: .5; 3 SOLUTION RESPIRATORY (INHALATION) at 16:17

## 2020-01-01 RX ADMIN — INSULIN LISPRO 19 UNITS: 100 INJECTION, SOLUTION INTRAVENOUS; SUBCUTANEOUS at 19:24

## 2020-01-01 RX ADMIN — HYDROCHLOROTHIAZIDE 25 MG: 25 TABLET ORAL at 08:44

## 2020-01-01 RX ADMIN — INSULIN LISPRO 9 UNITS: 100 INJECTION, SOLUTION INTRAVENOUS; SUBCUTANEOUS at 08:08

## 2020-01-01 RX ADMIN — SODIUM CHLORIDE, PRESERVATIVE FREE 10 ML: 5 INJECTION INTRAVENOUS at 08:46

## 2020-01-01 RX ADMIN — IPRATROPIUM BROMIDE AND ALBUTEROL SULFATE 1 AMPULE: 2.5; .5 SOLUTION RESPIRATORY (INHALATION) at 12:35

## 2020-01-01 RX ADMIN — PREDNISONE 40 MG: 20 TABLET ORAL at 14:05

## 2020-01-01 RX ADMIN — IPRATROPIUM BROMIDE AND ALBUTEROL SULFATE 1 AMPULE: 2.5; .5 SOLUTION RESPIRATORY (INHALATION) at 15:49

## 2020-01-01 RX ADMIN — INSULIN LISPRO 12 UNITS: 100 INJECTION, SOLUTION INTRAVENOUS; SUBCUTANEOUS at 12:12

## 2020-01-01 RX ADMIN — VANCOMYCIN HYDROCHLORIDE 1000 MG: 1 INJECTION, POWDER, LYOPHILIZED, FOR SOLUTION INTRAVENOUS at 02:40

## 2020-01-01 RX ADMIN — IPRATROPIUM BROMIDE AND ALBUTEROL SULFATE 1 AMPULE: .5; 3 SOLUTION RESPIRATORY (INHALATION) at 11:05

## 2020-01-01 RX ADMIN — INSULIN LISPRO 2 UNITS: 100 INJECTION, SOLUTION INTRAVENOUS; SUBCUTANEOUS at 11:40

## 2020-01-01 RX ADMIN — CALCIUM GLUCONATE 1 G: 94 INJECTION, SOLUTION INTRAVENOUS at 01:57

## 2020-01-01 RX ADMIN — HYDROCHLOROTHIAZIDE 25 MG: 25 TABLET ORAL at 09:01

## 2020-01-01 RX ADMIN — IPRATROPIUM BROMIDE AND ALBUTEROL SULFATE 1 AMPULE: 2.5; .5 SOLUTION RESPIRATORY (INHALATION) at 20:04

## 2020-01-01 RX ADMIN — INSULIN LISPRO 7 UNITS: 100 INJECTION, SOLUTION INTRAVENOUS; SUBCUTANEOUS at 16:32

## 2020-01-01 RX ADMIN — INSULIN GLARGINE 12 UNITS: 100 INJECTION, SOLUTION SUBCUTANEOUS at 09:48

## 2020-01-01 RX ADMIN — CALCIUM GLUCONATE 1 G: 20 INJECTION, SOLUTION INTRAVENOUS at 10:10

## 2020-01-01 RX ADMIN — CEFTRIAXONE 1 G: 1 INJECTION, POWDER, FOR SOLUTION INTRAMUSCULAR; INTRAVENOUS at 22:37

## 2020-01-01 RX ADMIN — IPRATROPIUM BROMIDE AND ALBUTEROL SULFATE 1 AMPULE: .5; 3 SOLUTION RESPIRATORY (INHALATION) at 09:05

## 2020-01-01 RX ADMIN — POLYETHYLENE GLYCOL 3350 17 G: 17 POWDER, FOR SOLUTION ORAL at 09:03

## 2020-01-01 RX ADMIN — ALBUTEROL SULFATE 5 MG: 2.5 SOLUTION RESPIRATORY (INHALATION) at 04:41

## 2020-01-01 RX ADMIN — IPRATROPIUM BROMIDE AND ALBUTEROL SULFATE 1 AMPULE: .5; 3 SOLUTION RESPIRATORY (INHALATION) at 21:02

## 2020-01-01 RX ADMIN — SODIUM CHLORIDE, POTASSIUM CHLORIDE, SODIUM LACTATE AND CALCIUM CHLORIDE: 600; 310; 30; 20 INJECTION, SOLUTION INTRAVENOUS at 05:21

## 2020-01-01 RX ADMIN — Medication 10 ML: at 08:41

## 2020-01-01 RX ADMIN — ENOXAPARIN SODIUM 40 MG: 40 INJECTION SUBCUTANEOUS at 09:03

## 2020-01-01 RX ADMIN — METHYLPREDNISOLONE SODIUM SUCCINATE 40 MG: 40 INJECTION, POWDER, FOR SOLUTION INTRAMUSCULAR; INTRAVENOUS at 09:39

## 2020-01-01 RX ADMIN — CEFTRIAXONE 1 G: 1 INJECTION, POWDER, FOR SOLUTION INTRAMUSCULAR; INTRAVENOUS at 09:04

## 2020-01-01 RX ADMIN — IPRATROPIUM BROMIDE AND ALBUTEROL SULFATE 1 AMPULE: .5; 3 SOLUTION RESPIRATORY (INHALATION) at 20:03

## 2020-01-01 RX ADMIN — DOXYCYCLINE HYCLATE 100 MG: 100 TABLET, COATED ORAL at 05:46

## 2020-01-01 RX ADMIN — PREDNISONE 40 MG: 20 TABLET ORAL at 09:03

## 2020-01-01 RX ADMIN — ALBUTEROL SULFATE 7.5 MG: 2.5 SOLUTION RESPIRATORY (INHALATION) at 19:44

## 2020-01-01 RX ADMIN — IPRATROPIUM BROMIDE AND ALBUTEROL SULFATE 1 AMPULE: .5; 3 SOLUTION RESPIRATORY (INHALATION) at 20:34

## 2020-01-01 RX ADMIN — HYDROCHLOROTHIAZIDE 25 MG: 25 TABLET ORAL at 08:21

## 2020-01-01 RX ADMIN — INSULIN LISPRO 2 UNITS: 100 INJECTION, SOLUTION INTRAVENOUS; SUBCUTANEOUS at 08:48

## 2020-01-01 RX ADMIN — IPRATROPIUM BROMIDE AND ALBUTEROL SULFATE 1 AMPULE: .5; 3 SOLUTION RESPIRATORY (INHALATION) at 09:11

## 2020-01-01 RX ADMIN — SODIUM BICARBONATE 50 MEQ: 84 INJECTION INTRAVENOUS at 02:00

## 2020-01-01 RX ADMIN — IPRATROPIUM BROMIDE AND ALBUTEROL SULFATE 1 AMPULE: .5; 3 SOLUTION RESPIRATORY (INHALATION) at 07:50

## 2020-01-01 RX ADMIN — PROPRANOLOL HYDROCHLORIDE 80 MG: 80 CAPSULE, EXTENDED RELEASE ORAL at 08:37

## 2020-01-01 RX ADMIN — DOXYCYCLINE HYCLATE 100 MG: 100 TABLET, COATED ORAL at 21:56

## 2020-01-01 RX ADMIN — INSULIN LISPRO 8 UNITS: 100 INJECTION, SOLUTION INTRAVENOUS; SUBCUTANEOUS at 08:43

## 2020-01-01 RX ADMIN — IPRATROPIUM BROMIDE AND ALBUTEROL SULFATE 1 AMPULE: .5; 3 SOLUTION RESPIRATORY (INHALATION) at 20:48

## 2020-01-01 RX ADMIN — DOXYCYCLINE 100 MG: 100 INJECTION, POWDER, LYOPHILIZED, FOR SOLUTION INTRAVENOUS at 20:17

## 2020-01-01 RX ADMIN — IPRATROPIUM BROMIDE AND ALBUTEROL SULFATE 1 AMPULE: 2.5; .5 SOLUTION RESPIRATORY (INHALATION) at 20:07

## 2020-01-01 RX ADMIN — IPRATROPIUM BROMIDE AND ALBUTEROL SULFATE 1 AMPULE: .5; 3 SOLUTION RESPIRATORY (INHALATION) at 16:50

## 2020-01-01 RX ADMIN — POLYETHYLENE GLYCOL 3350 17 G: 17 POWDER, FOR SOLUTION ORAL at 09:47

## 2020-01-01 RX ADMIN — VANCOMYCIN HYDROCHLORIDE 1500 MG: 10 INJECTION, POWDER, LYOPHILIZED, FOR SOLUTION INTRAVENOUS at 19:05

## 2020-01-01 RX ADMIN — IPRATROPIUM BROMIDE AND ALBUTEROL SULFATE 1 AMPULE: .5; 3 SOLUTION RESPIRATORY (INHALATION) at 16:06

## 2020-01-01 RX ADMIN — PANTOPRAZOLE SODIUM 40 MG: 40 INJECTION, POWDER, FOR SOLUTION INTRAVENOUS at 10:10

## 2020-01-01 RX ADMIN — METHYLPREDNISOLONE SODIUM SUCCINATE 60 MG: 125 INJECTION, POWDER, FOR SOLUTION INTRAMUSCULAR; INTRAVENOUS at 08:21

## 2020-01-01 RX ADMIN — SODIUM CHLORIDE, PRESERVATIVE FREE 10 ML: 5 INJECTION INTRAVENOUS at 22:04

## 2020-01-01 RX ADMIN — IPRATROPIUM BROMIDE AND ALBUTEROL SULFATE 1 AMPULE: 2.5; .5 SOLUTION RESPIRATORY (INHALATION) at 07:29

## 2020-01-01 RX ADMIN — DIPHENHYDRAMINE HCL 12.5 MG: 25 TABLET ORAL at 00:25

## 2020-01-01 RX ADMIN — DOXYCYCLINE 100 MG: 100 INJECTION, POWDER, LYOPHILIZED, FOR SOLUTION INTRAVENOUS at 08:35

## 2020-01-01 RX ADMIN — SODIUM PHOSPHATE, MONOBASIC, MONOHYDRATE 15 MMOL: 276; 142 INJECTION, SOLUTION INTRAVENOUS at 12:11

## 2020-01-01 RX ADMIN — SODIUM CHLORIDE 1000 ML: 9 INJECTION, SOLUTION INTRAVENOUS at 02:48

## 2020-01-01 RX ADMIN — IPRATROPIUM BROMIDE AND ALBUTEROL SULFATE 1 AMPULE: 2.5; .5 SOLUTION RESPIRATORY (INHALATION) at 16:00

## 2020-01-01 RX ADMIN — IPRATROPIUM BROMIDE AND ALBUTEROL SULFATE 1 AMPULE: .5; 3 SOLUTION RESPIRATORY (INHALATION) at 15:39

## 2020-01-01 RX ADMIN — LORAZEPAM 1 MG: 2 INJECTION INTRAMUSCULAR; INTRAVENOUS at 02:08

## 2020-01-01 RX ADMIN — INSULIN GLARGINE 5 UNITS: 100 INJECTION, SOLUTION SUBCUTANEOUS at 09:43

## 2020-01-01 RX ADMIN — ENOXAPARIN SODIUM 40 MG: 40 INJECTION SUBCUTANEOUS at 08:35

## 2020-01-01 RX ADMIN — Medication 10 ML: at 10:11

## 2020-01-01 RX ADMIN — INSULIN LISPRO 19 UNITS: 100 INJECTION, SOLUTION INTRAVENOUS; SUBCUTANEOUS at 11:46

## 2020-01-01 RX ADMIN — SODIUM CHLORIDE 1000 ML: 9 INJECTION, SOLUTION INTRAVENOUS at 03:58

## 2020-01-01 RX ADMIN — HYDROCHLOROTHIAZIDE 25 MG: 25 TABLET ORAL at 09:03

## 2020-01-01 RX ADMIN — IPRATROPIUM BROMIDE AND ALBUTEROL SULFATE 1 AMPULE: 2.5; .5 SOLUTION RESPIRATORY (INHALATION) at 08:08

## 2020-01-01 RX ADMIN — Medication 10 ML: at 08:36

## 2020-01-01 RX ADMIN — DOXYCYCLINE HYCLATE 100 MG: 100 TABLET, COATED ORAL at 10:34

## 2020-01-01 RX ADMIN — INSULIN LISPRO 16 UNITS: 100 INJECTION, SOLUTION INTRAVENOUS; SUBCUTANEOUS at 09:48

## 2020-01-01 RX ADMIN — INSULIN LISPRO 8 UNITS: 100 INJECTION, SOLUTION INTRAVENOUS; SUBCUTANEOUS at 12:36

## 2020-01-01 RX ADMIN — ENOXAPARIN SODIUM 40 MG: 40 INJECTION SUBCUTANEOUS at 09:26

## 2020-01-01 RX ADMIN — IPRATROPIUM BROMIDE AND ALBUTEROL SULFATE 1 AMPULE: 2.5; .5 SOLUTION RESPIRATORY (INHALATION) at 11:46

## 2020-01-01 RX ADMIN — POLYETHYLENE GLYCOL 3350 17 G: 17 POWDER, FOR SOLUTION ORAL at 08:44

## 2020-01-01 RX ADMIN — INSULIN LISPRO 19 UNITS: 100 INJECTION, SOLUTION INTRAVENOUS; SUBCUTANEOUS at 17:02

## 2020-01-01 RX ADMIN — MAGNESIUM SULFATE HEPTAHYDRATE 4 G: 40 INJECTION, SOLUTION INTRAVENOUS at 09:43

## 2020-01-01 RX ADMIN — INSULIN HUMAN 10 UNITS: 100 INJECTION, SOLUTION PARENTERAL at 09:31

## 2020-01-01 RX ADMIN — METHYLPREDNISOLONE SODIUM SUCCINATE 60 MG: 125 INJECTION, POWDER, FOR SOLUTION INTRAMUSCULAR; INTRAVENOUS at 04:05

## 2020-01-01 RX ADMIN — BISACODYL 5 MG: 5 TABLET, COATED ORAL at 21:51

## 2020-01-01 RX ADMIN — ROSUVASTATIN CALCIUM 5 MG: 10 TABLET, FILM COATED ORAL at 21:53

## 2020-01-01 RX ADMIN — IPRATROPIUM BROMIDE AND ALBUTEROL SULFATE 1 AMPULE: 2.5; .5 SOLUTION RESPIRATORY (INHALATION) at 11:53

## 2020-01-01 RX ADMIN — ENOXAPARIN SODIUM 40 MG: 40 INJECTION SUBCUTANEOUS at 09:47

## 2020-01-01 RX ADMIN — INSULIN GLARGINE 25 UNITS: 100 INJECTION, SOLUTION SUBCUTANEOUS at 20:18

## 2020-01-01 RX ADMIN — SODIUM CHLORIDE, PRESERVATIVE FREE 10 ML: 5 INJECTION INTRAVENOUS at 08:22

## 2020-01-01 RX ADMIN — HEPARIN SODIUM 5000 UNITS: 5000 INJECTION INTRAVENOUS; SUBCUTANEOUS at 04:27

## 2020-01-01 RX ADMIN — SODIUM CHLORIDE, POTASSIUM CHLORIDE, SODIUM LACTATE AND CALCIUM CHLORIDE: 600; 310; 30; 20 INJECTION, SOLUTION INTRAVENOUS at 20:30

## 2020-01-01 RX ADMIN — INSULIN LISPRO 12 UNITS: 100 INJECTION, SOLUTION INTRAVENOUS; SUBCUTANEOUS at 07:59

## 2020-01-01 RX ADMIN — INSULIN LISPRO 2 UNITS: 100 INJECTION, SOLUTION INTRAVENOUS; SUBCUTANEOUS at 10:15

## 2020-01-01 RX ADMIN — INSULIN GLARGINE 22 UNITS: 100 INJECTION, SOLUTION SUBCUTANEOUS at 08:36

## 2020-01-01 RX ADMIN — SODIUM CHLORIDE, PRESERVATIVE FREE 10 ML: 5 INJECTION INTRAVENOUS at 22:29

## 2020-01-01 RX ADMIN — INSULIN LISPRO 2 UNITS: 100 INJECTION, SOLUTION INTRAVENOUS; SUBCUTANEOUS at 17:04

## 2020-01-01 RX ADMIN — ALBUTEROL SULFATE 5 MG: 2.5 SOLUTION RESPIRATORY (INHALATION) at 00:57

## 2020-01-01 RX ADMIN — INSULIN LISPRO 19 UNITS: 100 INJECTION, SOLUTION INTRAVENOUS; SUBCUTANEOUS at 12:05

## 2020-01-01 RX ADMIN — CEFTRIAXONE 1 G: 1 INJECTION, POWDER, FOR SOLUTION INTRAMUSCULAR; INTRAVENOUS at 08:09

## 2020-01-01 RX ADMIN — METHYLPREDNISOLONE SODIUM SUCCINATE 60 MG: 125 INJECTION, POWDER, FOR SOLUTION INTRAMUSCULAR; INTRAVENOUS at 09:26

## 2020-01-01 RX ADMIN — PREDNISONE 40 MG: 20 TABLET ORAL at 08:18

## 2020-01-01 RX ADMIN — INSULIN GLARGINE 28 UNITS: 100 INJECTION, SOLUTION SUBCUTANEOUS at 08:19

## 2020-01-01 RX ADMIN — SODIUM CHLORIDE, PRESERVATIVE FREE 10 ML: 5 INJECTION INTRAVENOUS at 09:07

## 2020-01-01 RX ADMIN — HYDROCHLOROTHIAZIDE 25 MG: 25 TABLET ORAL at 09:48

## 2020-01-01 RX ADMIN — INSULIN LISPRO 19 UNITS: 100 INJECTION, SOLUTION INTRAVENOUS; SUBCUTANEOUS at 22:58

## 2020-01-01 RX ADMIN — INSULIN LISPRO 12 UNITS: 100 INJECTION, SOLUTION INTRAVENOUS; SUBCUTANEOUS at 12:06

## 2020-01-01 RX ADMIN — NOREPINEPHRINE BITARTRATE 4 MCG/MIN: 1 INJECTION, SOLUTION, CONCENTRATE INTRAVENOUS at 09:46

## 2020-01-01 RX ADMIN — DOXYCYCLINE 100 MG: 100 INJECTION, POWDER, LYOPHILIZED, FOR SOLUTION INTRAVENOUS at 08:37

## 2020-01-01 RX ADMIN — IPRATROPIUM BROMIDE AND ALBUTEROL SULFATE 1 AMPULE: .5; 3 SOLUTION RESPIRATORY (INHALATION) at 16:08

## 2020-01-01 RX ADMIN — ROSUVASTATIN CALCIUM 5 MG: 10 TABLET, FILM COATED ORAL at 20:17

## 2020-01-01 RX ADMIN — IPRATROPIUM BROMIDE AND ALBUTEROL SULFATE 1 AMPULE: 2.5; .5 SOLUTION RESPIRATORY (INHALATION) at 20:03

## 2020-01-01 RX ADMIN — INSULIN LISPRO 3 UNITS: 100 INJECTION, SOLUTION INTRAVENOUS; SUBCUTANEOUS at 08:21

## 2020-01-01 RX ADMIN — BUDESONIDE AND FORMOTEROL FUMARATE DIHYDRATE 2 PUFF: 160; 4.5 AEROSOL RESPIRATORY (INHALATION) at 20:07

## 2020-01-01 RX ADMIN — MAGNESIUM SULFATE HEPTAHYDRATE 2 G: 40 INJECTION, SOLUTION INTRAVENOUS at 03:53

## 2020-01-01 RX ADMIN — ENOXAPARIN SODIUM 40 MG: 40 INJECTION SUBCUTANEOUS at 08:21

## 2020-01-01 RX ADMIN — CEFEPIME HYDROCHLORIDE 2 G: 2 INJECTION, POWDER, FOR SOLUTION INTRAVENOUS at 02:39

## 2020-01-01 RX ADMIN — EPINEPHRINE 1 MG: 0.1 INJECTION INTRACARDIAC; INTRAVENOUS at 01:59

## 2020-01-01 RX ADMIN — FLUTICASONE PROPIONATE 1 PUFF: 44 AEROSOL, METERED RESPIRATORY (INHALATION) at 20:03

## 2020-01-01 RX ADMIN — SODIUM CHLORIDE, PRESERVATIVE FREE 10 ML: 5 INJECTION INTRAVENOUS at 21:53

## 2020-01-01 RX ADMIN — METHYLPREDNISOLONE SODIUM SUCCINATE 40 MG: 40 INJECTION, POWDER, FOR SOLUTION INTRAMUSCULAR; INTRAVENOUS at 21:53

## 2020-01-01 RX ADMIN — DOXYCYCLINE 100 MG: 100 INJECTION, POWDER, LYOPHILIZED, FOR SOLUTION INTRAVENOUS at 10:10

## 2020-01-01 RX ADMIN — SODIUM CHLORIDE, PRESERVATIVE FREE 10 ML: 5 INJECTION INTRAVENOUS at 09:08

## 2020-01-01 RX ADMIN — HEPARIN SODIUM 5600 UNITS: 1000 INJECTION INTRAVENOUS; SUBCUTANEOUS at 05:21

## 2020-01-01 RX ADMIN — INSULIN LISPRO 12 UNITS: 100 INJECTION, SOLUTION INTRAVENOUS; SUBCUTANEOUS at 12:11

## 2020-01-01 RX ADMIN — INSULIN LISPRO 17 UNITS: 100 INJECTION, SOLUTION INTRAVENOUS; SUBCUTANEOUS at 18:50

## 2020-01-01 RX ADMIN — IPRATROPIUM BROMIDE AND ALBUTEROL SULFATE 1 AMPULE: .5; 3 SOLUTION RESPIRATORY (INHALATION) at 11:52

## 2020-01-01 RX ADMIN — INSULIN LISPRO 7 UNITS: 100 INJECTION, SOLUTION INTRAVENOUS; SUBCUTANEOUS at 13:39

## 2020-01-01 RX ADMIN — METHYLPREDNISOLONE SODIUM SUCCINATE 125 MG: 125 INJECTION, POWDER, FOR SOLUTION INTRAMUSCULAR; INTRAVENOUS at 17:27

## 2020-01-01 RX ADMIN — SODIUM CHLORIDE, PRESERVATIVE FREE 10 ML: 5 INJECTION INTRAVENOUS at 20:09

## 2020-01-01 RX ADMIN — METHYLPREDNISOLONE SODIUM SUCCINATE 40 MG: 40 INJECTION, POWDER, FOR SOLUTION INTRAMUSCULAR; INTRAVENOUS at 08:09

## 2020-01-01 RX ADMIN — INSULIN LISPRO 9 UNITS: 100 INJECTION, SOLUTION INTRAVENOUS; SUBCUTANEOUS at 12:12

## 2020-01-01 RX ADMIN — INSULIN LISPRO 6 UNITS: 100 INJECTION, SOLUTION INTRAVENOUS; SUBCUTANEOUS at 08:19

## 2020-01-01 RX ADMIN — INSULIN LISPRO 9 UNITS: 100 INJECTION, SOLUTION INTRAVENOUS; SUBCUTANEOUS at 11:55

## 2020-01-01 RX ADMIN — SODIUM CHLORIDE, POTASSIUM CHLORIDE, SODIUM LACTATE AND CALCIUM CHLORIDE: 600; 310; 30; 20 INJECTION, SOLUTION INTRAVENOUS at 08:35

## 2020-01-01 RX ADMIN — WATER 2 G: 1 INJECTION INTRAMUSCULAR; INTRAVENOUS; SUBCUTANEOUS at 08:37

## 2020-01-01 RX ADMIN — WATER 2 G: 1 INJECTION INTRAMUSCULAR; INTRAVENOUS; SUBCUTANEOUS at 08:18

## 2020-01-01 RX ADMIN — INSULIN GLARGINE 10 UNITS: 100 INJECTION, SOLUTION SUBCUTANEOUS at 08:09

## 2020-01-01 RX ADMIN — METHYLPREDNISOLONE SODIUM SUCCINATE 60 MG: 125 INJECTION, POWDER, FOR SOLUTION INTRAMUSCULAR; INTRAVENOUS at 04:48

## 2020-01-01 RX ADMIN — IPRATROPIUM BROMIDE AND ALBUTEROL SULFATE 1 AMPULE: 2.5; .5 SOLUTION RESPIRATORY (INHALATION) at 08:02

## 2020-01-01 RX ADMIN — FLUTICASONE PROPIONATE 1 PUFF: 44 AEROSOL, METERED RESPIRATORY (INHALATION) at 08:03

## 2020-01-01 RX ADMIN — IOPAMIDOL 75 ML: 755 INJECTION, SOLUTION INTRAVENOUS at 10:53

## 2020-01-01 RX ADMIN — DEXAMETHASONE SODIUM PHOSPHATE 10 MG: 4 INJECTION, SOLUTION INTRAMUSCULAR; INTRAVENOUS at 03:54

## 2020-01-01 RX ADMIN — DOXYCYCLINE 100 MG: 100 INJECTION, POWDER, LYOPHILIZED, FOR SOLUTION INTRAVENOUS at 09:40

## 2020-01-01 RX ADMIN — ROSUVASTATIN CALCIUM 5 MG: 10 TABLET, FILM COATED ORAL at 21:56

## 2020-01-01 RX ADMIN — ROSUVASTATIN CALCIUM 5 MG: 10 TABLET, FILM COATED ORAL at 21:51

## 2020-01-01 RX ADMIN — ENOXAPARIN SODIUM 40 MG: 40 INJECTION SUBCUTANEOUS at 09:01

## 2020-01-01 RX ADMIN — Medication 10 ML: at 20:43

## 2020-01-01 RX ADMIN — INSULIN LISPRO 19 UNITS: 100 INJECTION, SOLUTION INTRAVENOUS; SUBCUTANEOUS at 13:43

## 2020-01-01 RX ADMIN — INSULIN LISPRO 15 UNITS: 100 INJECTION, SOLUTION INTRAVENOUS; SUBCUTANEOUS at 16:54

## 2020-01-01 RX ADMIN — ALBUTEROL SULFATE 2.5 MG: 2.5 SOLUTION RESPIRATORY (INHALATION) at 18:33

## 2020-01-01 RX ADMIN — INSULIN LISPRO 6 UNITS: 100 INJECTION, SOLUTION INTRAVENOUS; SUBCUTANEOUS at 21:56

## 2020-01-01 RX ADMIN — INSULIN GLARGINE 20 UNITS: 100 INJECTION, SOLUTION SUBCUTANEOUS at 08:36

## 2020-01-01 RX ADMIN — SODIUM CHLORIDE 1000 ML: 9 INJECTION, SOLUTION INTRAVENOUS at 17:13

## 2020-01-01 RX ADMIN — INSULIN GLARGINE 20 UNITS: 100 INJECTION, SOLUTION SUBCUTANEOUS at 20:42

## 2020-01-01 RX ADMIN — INSULIN LISPRO 6 UNITS: 100 INJECTION, SOLUTION INTRAVENOUS; SUBCUTANEOUS at 20:42

## 2020-01-01 RX ADMIN — Medication 10 ML: at 20:25

## 2020-01-01 RX ADMIN — IPRATROPIUM BROMIDE AND ALBUTEROL SULFATE 1 AMPULE: .5; 3 SOLUTION RESPIRATORY (INHALATION) at 11:38

## 2020-01-01 RX ADMIN — BISACODYL 5 MG: 5 TABLET, COATED ORAL at 09:03

## 2020-01-01 RX ADMIN — INSULIN LISPRO 9 UNITS: 100 INJECTION, SOLUTION INTRAVENOUS; SUBCUTANEOUS at 08:36

## 2020-01-01 RX ADMIN — CEFTRIAXONE 2 G: 2 INJECTION, POWDER, FOR SOLUTION INTRAMUSCULAR; INTRAVENOUS at 21:03

## 2020-01-01 RX ADMIN — ROSUVASTATIN CALCIUM 5 MG: 10 TABLET, FILM COATED ORAL at 20:54

## 2020-01-01 RX ADMIN — METHYLPREDNISOLONE SODIUM SUCCINATE 40 MG: 40 INJECTION, POWDER, FOR SOLUTION INTRAMUSCULAR; INTRAVENOUS at 13:38

## 2020-01-01 RX ADMIN — IPRATROPIUM BROMIDE AND ALBUTEROL SULFATE 1 AMPULE: .5; 3 SOLUTION RESPIRATORY (INHALATION) at 16:35

## 2020-01-01 RX ADMIN — DOXYCYCLINE 100 MG: 100 INJECTION, POWDER, LYOPHILIZED, FOR SOLUTION INTRAVENOUS at 20:42

## 2020-01-01 RX ADMIN — INSULIN LISPRO 7 UNITS: 100 INJECTION, SOLUTION INTRAVENOUS; SUBCUTANEOUS at 22:01

## 2020-01-01 RX ADMIN — INSULIN LISPRO 16 UNITS: 100 INJECTION, SOLUTION INTRAVENOUS; SUBCUTANEOUS at 09:02

## 2020-01-01 RX ADMIN — SODIUM CHLORIDE, POTASSIUM CHLORIDE, SODIUM LACTATE AND CALCIUM CHLORIDE 3129 ML: 600; 310; 30; 20 INJECTION, SOLUTION INTRAVENOUS at 19:21

## 2020-01-01 RX ADMIN — METHYLPREDNISOLONE SODIUM SUCCINATE 60 MG: 125 INJECTION, POWDER, FOR SOLUTION INTRAMUSCULAR; INTRAVENOUS at 21:56

## 2020-01-01 RX ADMIN — INSULIN LISPRO 2 UNITS: 100 INJECTION, SOLUTION INTRAVENOUS; SUBCUTANEOUS at 04:48

## 2020-01-01 RX ADMIN — INSULIN GLARGINE 12 UNITS: 100 INJECTION, SOLUTION SUBCUTANEOUS at 09:01

## 2020-01-01 RX ADMIN — ENOXAPARIN SODIUM 40 MG: 40 INJECTION SUBCUTANEOUS at 08:09

## 2020-01-01 RX ADMIN — DIPHENHYDRAMINE HCL 12.5 MG: 25 TABLET ORAL at 21:51

## 2020-01-01 RX ADMIN — INSULIN GLARGINE 25 UNITS: 100 INJECTION, SOLUTION SUBCUTANEOUS at 21:52

## 2020-01-01 RX ADMIN — IOPAMIDOL 75 ML: 755 INJECTION, SOLUTION INTRAVENOUS at 03:54

## 2020-01-01 RX ADMIN — HEPARIN SODIUM 5000 UNITS: 5000 INJECTION INTRAVENOUS; SUBCUTANEOUS at 04:29

## 2020-01-01 RX ADMIN — ACETAMINOPHEN 650 MG: 325 TABLET ORAL at 00:10

## 2020-01-01 RX ADMIN — INSULIN LISPRO 9 UNITS: 100 INJECTION, SOLUTION INTRAVENOUS; SUBCUTANEOUS at 09:27

## 2020-01-01 RX ADMIN — METRONIDAZOLE 500 MG: 500 INJECTION, SOLUTION INTRAVENOUS at 03:28

## 2020-01-01 RX ADMIN — IPRATROPIUM BROMIDE AND ALBUTEROL SULFATE 1 AMPULE: .5; 3 SOLUTION RESPIRATORY (INHALATION) at 03:55

## 2020-01-01 RX ADMIN — IPRATROPIUM BROMIDE AND ALBUTEROL SULFATE 1 AMPULE: .5; 3 SOLUTION RESPIRATORY (INHALATION) at 12:03

## 2020-01-01 RX ADMIN — IPRATROPIUM BROMIDE AND ALBUTEROL SULFATE 1 AMPULE: 2.5; .5 SOLUTION RESPIRATORY (INHALATION) at 11:43

## 2020-01-01 RX ADMIN — EPINEPHRINE 1 MG: 0.1 INJECTION INTRACARDIAC; INTRAVENOUS at 01:55

## 2020-01-01 RX ADMIN — INSULIN LISPRO 18 UNITS: 100 INJECTION, SOLUTION INTRAVENOUS; SUBCUTANEOUS at 16:32

## 2020-01-01 RX ADMIN — Medication 10 ML: at 08:19

## 2020-01-01 RX ADMIN — IPRATROPIUM BROMIDE AND ALBUTEROL SULFATE 1 AMPULE: .5; 3 SOLUTION RESPIRATORY (INHALATION) at 07:42

## 2020-01-01 RX ADMIN — ENOXAPARIN SODIUM 40 MG: 40 INJECTION SUBCUTANEOUS at 09:39

## 2020-01-01 RX ADMIN — HEPARIN SODIUM 5000 UNITS: 5000 INJECTION INTRAVENOUS; SUBCUTANEOUS at 20:42

## 2020-01-01 RX ADMIN — INSULIN LISPRO 12 UNITS: 100 INJECTION, SOLUTION INTRAVENOUS; SUBCUTANEOUS at 12:35

## 2020-01-01 RX ADMIN — CEFTRIAXONE 1 G: 1 INJECTION, POWDER, FOR SOLUTION INTRAMUSCULAR; INTRAVENOUS at 21:52

## 2020-01-01 RX ADMIN — IOPAMIDOL 75 ML: 755 INJECTION, SOLUTION INTRAVENOUS at 03:38

## 2020-01-01 RX ADMIN — WATER 2 G: 1 INJECTION INTRAMUSCULAR; INTRAVENOUS; SUBCUTANEOUS at 09:39

## 2020-01-01 RX ADMIN — HYDROCHLOROTHIAZIDE 25 MG: 25 TABLET ORAL at 08:08

## 2020-01-01 RX ADMIN — POLYETHYLENE GLYCOL 3350 17 G: 17 POWDER, FOR SOLUTION ORAL at 12:45

## 2020-01-01 RX ADMIN — SODIUM CHLORIDE, PRESERVATIVE FREE 10 ML: 5 INJECTION INTRAVENOUS at 21:00

## 2020-01-01 RX ADMIN — METHYLPREDNISOLONE SODIUM SUCCINATE 40 MG: 40 INJECTION, POWDER, FOR SOLUTION INTRAMUSCULAR; INTRAVENOUS at 14:56

## 2020-01-01 RX ADMIN — METHYLPREDNISOLONE SODIUM SUCCINATE 40 MG: 40 INJECTION, POWDER, FOR SOLUTION INTRAMUSCULAR; INTRAVENOUS at 02:06

## 2020-01-01 RX ADMIN — HEPARIN SODIUM 5000 UNITS: 5000 INJECTION INTRAVENOUS; SUBCUTANEOUS at 14:03

## 2020-01-01 RX ADMIN — IPRATROPIUM BROMIDE AND ALBUTEROL SULFATE 1 AMPULE: .5; 3 SOLUTION RESPIRATORY (INHALATION) at 08:38

## 2020-01-01 RX ADMIN — ACETAMINOPHEN 650 MG: 325 TABLET, FILM COATED ORAL at 04:52

## 2020-01-01 RX ADMIN — INSULIN LISPRO 9 UNITS: 100 INJECTION, SOLUTION INTRAVENOUS; SUBCUTANEOUS at 08:20

## 2020-01-01 RX ADMIN — INSULIN LISPRO 5 UNITS: 100 INJECTION, SOLUTION INTRAVENOUS; SUBCUTANEOUS at 20:18

## 2020-01-01 RX ADMIN — ROSUVASTATIN CALCIUM 5 MG: 10 TABLET, FILM COATED ORAL at 21:04

## 2020-01-01 RX ADMIN — IPRATROPIUM BROMIDE AND ALBUTEROL SULFATE 1 AMPULE: .5; 3 SOLUTION RESPIRATORY (INHALATION) at 07:39

## 2020-01-01 RX ADMIN — ROSUVASTATIN CALCIUM 5 MG: 10 TABLET, FILM COATED ORAL at 20:41

## 2020-01-01 RX ADMIN — INSULIN GLARGINE 10 UNITS: 100 INJECTION, SOLUTION SUBCUTANEOUS at 09:57

## 2020-01-01 RX ADMIN — IPRATROPIUM BROMIDE AND ALBUTEROL SULFATE 3 AMPULE: .5; 3 SOLUTION RESPIRATORY (INHALATION) at 16:25

## 2020-01-01 RX ADMIN — INSULIN LISPRO 19 UNITS: 100 INJECTION, SOLUTION INTRAVENOUS; SUBCUTANEOUS at 20:54

## 2020-01-01 RX ADMIN — INSULIN LISPRO 12 UNITS: 100 INJECTION, SOLUTION INTRAVENOUS; SUBCUTANEOUS at 17:30

## 2020-01-01 RX ADMIN — CEFTRIAXONE 1 G: 1 INJECTION, POWDER, FOR SOLUTION INTRAMUSCULAR; INTRAVENOUS at 20:54

## 2020-01-01 RX ADMIN — SODIUM CHLORIDE, PRESERVATIVE FREE 10 ML: 5 INJECTION INTRAVENOUS at 20:18

## 2020-01-01 RX ADMIN — Medication 10 ML: at 21:55

## 2020-01-01 RX ADMIN — CEFTRIAXONE 1 G: 1 INJECTION, POWDER, FOR SOLUTION INTRAMUSCULAR; INTRAVENOUS at 09:01

## 2020-01-01 RX ADMIN — MAGNESIUM SULFATE 4 G: 4 INJECTION INTRAVENOUS at 09:40

## 2020-01-01 RX ADMIN — BISACODYL 5 MG: 5 TABLET, COATED ORAL at 13:30

## 2020-01-01 RX ADMIN — FLUTICASONE PROPIONATE 1 PUFF: 44 AEROSOL, METERED RESPIRATORY (INHALATION) at 07:29

## 2020-01-01 RX ADMIN — METHYLPREDNISOLONE SODIUM SUCCINATE 60 MG: 125 INJECTION, POWDER, FOR SOLUTION INTRAMUSCULAR; INTRAVENOUS at 16:55

## 2020-01-01 RX ADMIN — PREDNISONE 40 MG: 20 TABLET ORAL at 09:48

## 2020-01-01 RX ADMIN — DOXYCYCLINE 100 MG: 100 INJECTION, POWDER, LYOPHILIZED, FOR SOLUTION INTRAVENOUS at 21:51

## 2020-01-01 RX ADMIN — WATER 2 G: 1 INJECTION INTRAMUSCULAR; INTRAVENOUS; SUBCUTANEOUS at 08:35

## 2020-01-01 RX ADMIN — INSULIN LISPRO 8 UNITS: 100 INJECTION, SOLUTION INTRAVENOUS; SUBCUTANEOUS at 18:42

## 2020-01-01 RX ADMIN — CHLORHEXIDINE GLUCONATE 15 ML: 1.2 RINSE ORAL at 09:04

## 2020-01-01 RX ADMIN — PROPRANOLOL HYDROCHLORIDE 80 MG: 80 CAPSULE, EXTENDED RELEASE ORAL at 08:32

## 2020-01-01 RX ADMIN — Medication 10 ML: at 09:04

## 2020-01-01 RX ADMIN — INSULIN LISPRO 19 UNITS: 100 INJECTION, SOLUTION INTRAVENOUS; SUBCUTANEOUS at 21:11

## 2020-01-01 RX ADMIN — ENOXAPARIN SODIUM 40 MG: 40 INJECTION SUBCUTANEOUS at 08:44

## 2020-01-01 RX ADMIN — SODIUM CHLORIDE, PRESERVATIVE FREE 10 ML: 5 INJECTION INTRAVENOUS at 21:10

## 2020-01-01 RX ADMIN — HEPARIN SODIUM 12.5 ML/HR: 10000 INJECTION, SOLUTION INTRAVENOUS at 05:21

## 2020-01-01 RX ADMIN — MAGNESIUM SULFATE IN WATER 2 G: 40 INJECTION, SOLUTION INTRAVENOUS at 12:02

## 2020-01-01 RX ADMIN — INSULIN LISPRO 19 UNITS: 100 INJECTION, SOLUTION INTRAVENOUS; SUBCUTANEOUS at 18:19

## 2020-01-01 RX ADMIN — INSULIN LISPRO 6 UNITS: 100 INJECTION, SOLUTION INTRAVENOUS; SUBCUTANEOUS at 18:41

## 2020-01-01 RX ADMIN — METHYLPREDNISOLONE SODIUM SUCCINATE 40 MG: 40 INJECTION, POWDER, FOR SOLUTION INTRAMUSCULAR; INTRAVENOUS at 09:04

## 2020-01-01 RX ADMIN — METHYLPREDNISOLONE SODIUM SUCCINATE 40 MG: 40 INJECTION, POWDER, FOR SOLUTION INTRAMUSCULAR; INTRAVENOUS at 02:11

## 2020-01-01 RX ADMIN — IPRATROPIUM BROMIDE AND ALBUTEROL SULFATE 1 AMPULE: 2.5; .5 SOLUTION RESPIRATORY (INHALATION) at 16:29

## 2020-01-01 RX ADMIN — CEFEPIME HYDROCHLORIDE 2 G: 2 INJECTION, POWDER, FOR SOLUTION INTRAVENOUS at 19:27

## 2020-01-01 RX ADMIN — PREDNISONE 40 MG: 20 TABLET ORAL at 08:44

## 2020-01-01 RX ADMIN — IPRATROPIUM BROMIDE AND ALBUTEROL SULFATE 1 AMPULE: 2.5; .5 SOLUTION RESPIRATORY (INHALATION) at 08:03

## 2020-01-01 RX ADMIN — IPRATROPIUM BROMIDE AND ALBUTEROL SULFATE 1 AMPULE: .5; 3 SOLUTION RESPIRATORY (INHALATION) at 20:29

## 2020-01-01 RX ADMIN — PREDNISONE 40 MG: 20 TABLET ORAL at 12:57

## 2020-01-01 RX ADMIN — FLUTICASONE PROPIONATE 1 PUFF: 44 AEROSOL, METERED RESPIRATORY (INHALATION) at 20:04

## 2020-01-01 RX ADMIN — Medication 16 MG: at 02:31

## 2020-01-01 RX ADMIN — Medication 10 ML: at 08:21

## 2020-01-01 RX ADMIN — SODIUM CHLORIDE, PRESERVATIVE FREE 10 ML: 5 INJECTION INTRAVENOUS at 08:18

## 2020-01-01 RX ADMIN — INSULIN LISPRO 9 UNITS: 100 INJECTION, SOLUTION INTRAVENOUS; SUBCUTANEOUS at 17:04

## 2020-01-01 RX ADMIN — INSULIN LISPRO 1 UNITS: 100 INJECTION, SOLUTION INTRAVENOUS; SUBCUTANEOUS at 21:15

## 2020-01-01 RX ADMIN — CEFTRIAXONE 1 G: 1 INJECTION, POWDER, FOR SOLUTION INTRAMUSCULAR; INTRAVENOUS at 09:26

## 2020-01-01 RX ADMIN — PREDNISONE 40 MG: 20 TABLET ORAL at 09:01

## 2020-01-01 ASSESSMENT — PAIN DESCRIPTION - ONSET
ONSET: PROGRESSIVE
ONSET: ON-GOING
ONSET: AWAKENED FROM SLEEP

## 2020-01-01 ASSESSMENT — PAIN SCALES - PAIN ASSESSMENT IN ADVANCED DEMENTIA (PAINAD)
BODYLANGUAGE: 0
FACIALEXPRESSION: 0
BODYLANGUAGE: 0
BREATHING: 0
BREATHING: 0
FACIALEXPRESSION: 0
FACIALEXPRESSION: 0
TOTALSCORE: 0
CONSOLABILITY: 0
CONSOLABILITY: 0
BODYLANGUAGE: 0
BREATHING: 0
NEGVOCALIZATION: 0
NEGVOCALIZATION: 0
CONSOLABILITY: 0
TOTALSCORE: 0
NEGVOCALIZATION: 0
TOTALSCORE: 0

## 2020-01-01 ASSESSMENT — PAIN SCALES - GENERAL
PAINLEVEL_OUTOF10: 0
PAINLEVEL_OUTOF10: 0
PAINLEVEL_OUTOF10: 3
PAINLEVEL_OUTOF10: 0
PAINLEVEL_OUTOF10: 9
PAINLEVEL_OUTOF10: 0
PAINLEVEL_OUTOF10: 3
PAINLEVEL_OUTOF10: 0
PAINLEVEL_OUTOF10: 0
PAINLEVEL_OUTOF10: 4

## 2020-01-01 ASSESSMENT — SLEEP AND FATIGUE QUESTIONNAIRES
HOW LIKELY ARE YOU TO NOD OFF OR FALL ASLEEP WHILE WATCHING TV: 1
HOW LIKELY ARE YOU TO NOD OFF OR FALL ASLEEP WHILE SITTING AND TALKING TO SOMEONE: 0
NECK CIRCUMFERENCE (INCHES): 18
HOW LIKELY ARE YOU TO NOD OFF OR FALL ASLEEP WHILE SITTING INACTIVE IN A PUBLIC PLACE: 0
HOW LIKELY ARE YOU TO NOD OFF OR FALL ASLEEP WHEN YOU ARE A PASSENGER IN A CAR FOR AN HOUR WITHOUT A BREAK: 1
HOW LIKELY ARE YOU TO NOD OFF OR FALL ASLEEP WHILE SITTING AND READING: 1
HOW LIKELY ARE YOU TO NOD OFF OR FALL ASLEEP IN A CAR, WHILE STOPPED FOR A FEW MINUTES IN TRAFFIC: 0
HOW LIKELY ARE YOU TO NOD OFF OR FALL ASLEEP WHILE LYING DOWN TO REST IN THE AFTERNOON WHEN CIRCUMSTANCES PERMIT: 3
HOW LIKELY ARE YOU TO NOD OFF OR FALL ASLEEP WHILE SITTING QUIETLY AFTER LUNCH WITHOUT ALCOHOL: 0
ESS TOTAL SCORE: 6

## 2020-01-01 ASSESSMENT — PAIN DESCRIPTION - DESCRIPTORS
DESCRIPTORS: HEADACHE
DESCRIPTORS: ACHING
DESCRIPTORS: ACHING

## 2020-01-01 ASSESSMENT — PAIN DESCRIPTION - FREQUENCY
FREQUENCY: CONTINUOUS
FREQUENCY: INTERMITTENT

## 2020-01-01 ASSESSMENT — PULMONARY FUNCTION TESTS
PIF_VALUE: 39
PIF_VALUE: 38
PIF_VALUE: 39
PIF_VALUE: 38
PIF_VALUE: 36

## 2020-01-01 ASSESSMENT — PAIN DESCRIPTION - DIRECTION: RADIATING_TOWARDS: TO THE BACK

## 2020-01-01 ASSESSMENT — PAIN DESCRIPTION - LOCATION
LOCATION: HIP
LOCATION: HEAD
LOCATION: LEG

## 2020-01-01 ASSESSMENT — PAIN DESCRIPTION - PAIN TYPE
TYPE: CHRONIC PAIN
TYPE: ACUTE PAIN
TYPE: ACUTE PAIN

## 2020-01-01 ASSESSMENT — PAIN DESCRIPTION - PROGRESSION
CLINICAL_PROGRESSION: NOT CHANGED
CLINICAL_PROGRESSION: NOT CHANGED
CLINICAL_PROGRESSION: GRADUALLY WORSENING

## 2020-01-01 ASSESSMENT — PAIN - FUNCTIONAL ASSESSMENT
PAIN_FUNCTIONAL_ASSESSMENT: ACTIVITIES ARE NOT PREVENTED
PAIN_FUNCTIONAL_ASSESSMENT: PREVENTS OR INTERFERES SOME ACTIVE ACTIVITIES AND ADLS
PAIN_FUNCTIONAL_ASSESSMENT: PREVENTS OR INTERFERES SOME ACTIVE ACTIVITIES AND ADLS

## 2020-01-01 ASSESSMENT — PAIN DESCRIPTION - ORIENTATION
ORIENTATION: LEFT
ORIENTATION: RIGHT;LEFT
ORIENTATION: LEFT

## 2020-05-09 NOTE — ED PROVIDER NOTES
None    Food insecurity     Worry: None     Inability: None    Transportation needs     Medical: None     Non-medical: None   Tobacco Use    Smoking status: Former Smoker     Types: Cigarettes     Last attempt to quit: 1999     Years since quittin.4    Smokeless tobacco: Never Used   Substance and Sexual Activity    Alcohol use: No    Drug use: No    Sexual activity: Yes     Partners: Male   Lifestyle    Physical activity     Days per week: None     Minutes per session: None    Stress: None   Relationships    Social connections     Talks on phone: None     Gets together: None     Attends Yarsanism service: None     Active member of club or organization: None     Attends meetings of clubs or organizations: None     Relationship status: None    Intimate partner violence     Fear of current or ex partner: None     Emotionally abused: None     Physically abused: None     Forced sexual activity: None   Other Topics Concern    None   Social History Narrative    None        Review of Systems   10 total systems reviewed and found to be negative unless otherwise noted in HPI     Physical Exam   BP (!) 126/91   Pulse 76   Temp 98 °F (36.7 °C) (Oral)   Resp 21   Ht 5' 1\" (1.549 m)   Wt 227 lb 15.3 oz (103.4 kg)   SpO2 98%   BMI 43.07 kg/m²      CONSTITUTIONAL:In respiratory distress  HEAD: atraumatic, normocephalic   EYES: PERRL, No injection, discharge or scleral icterus. ENT: Moist mucous membranes. NECK: Normal ROM, NO LAD   CARDIOVASCULAR: Regular rate and rhythm. No murmurs or gallop. PULMONARY/CHEST: No wheezing, but in respiratory distress requiring oxygen  ABDOMEN: Soft, Non-distended and non-tender, without guarding or rebound. SKIN: Acyanotic, warm, dry   MUSCULOSKELETAL: No swelling, tenderness or deformity   NEUROLOGICAL: Lethargic but responding to questions. Pulses intact. Grossly nonfocal   Nursing note and vitals reviewed.      ED Course & Medical Decision Making at:  [facility]  [address]  [place], [place] [address]   Phone [phone]   TROPONIN    Narrative:     Performed at:  [facility]  [address] [facility] [place] [facility] [address]   Phone [phone]   BRAIN NATRIURETIC PEPTIDE    Narrative:     Performed at:  [facility]  [address] [facility] [place], [place] [facility] [address]   Phone [phone]   LACTIC ACID, PLASMA    Narrative:     Performed at:  [facility]  [address] [place], [place] [facility] [address]   Phone [phone]   URINE RT REFLEX TO CULTURE   COVID-19   COVID-19   COVID-19      XR CHEST PORTABLE   Final Result   No acute process. EKG INTERPRETATION:  EKG by my preliminary interpretation shows sinus rhythm with rate of 77, normal axis, normal intervals, with no ST changes indicative of ischemia at this time. PROCEDURES:   Procedures    ASSESSMENT AND PLAN:  Lincoln Laguerre is a 80 y.o. female w/ COPD on no oxygen at home, kidney cancer, hypertension, hyperlipidemia, diabetes who presents this evening with complaint of shortness of breath started 24 hours ago. On exam looks ill, lethargic but following commands and answering questions. Labs showed leukocytosis of 13.4, VBG with POC2 108 and PH 7.18. The rest of the labs including troponins, lactate, proBNP are unremarkable. Chest x-ray showed no acute lung disease. At this time, I believe that her symptoms are probably secondary to COPD exacerbation. Less likely CHF, or pneumonia. she was given breathing treatments, steroids, magnesium with improvement. He is currently on BiPAP for hypercapnia. Hospitalist was consulted and patient admitted to their service for further treatment. COVID-19 infection is less likely but test is pending. Think that she needs intubation at this time given the fact that she is improving on BiPAP.   No indication for antibiotics

## 2020-05-09 NOTE — PROGRESS NOTES
0800: Shift Assessment completed, see flowsheet  0930: DR. Matteo Christianson at bedside  945: Critical care rounded, see new orders  1200: Reassessment completed,  see flow sheet  1300: Patient awake requesting Bipap taking off. Patient place on Nasal canula 4 liter. Will continue to monitor.   1500: Patient on 3 liters of O2  1600: Reassessment completed, see flow sheet  1800: Patient on 2 liters of O2    1900: Shift hand off with Salomon Stack and Jaida Rangel

## 2020-05-10 NOTE — PROGRESS NOTES
Metabolic Encephalopathy due to CO2 narcosis - resolved  BiPAP. Doxy and Rocephin IV. Solumedrol q6 - to q12. DuoNeb. Hold HCTZ.          DMII - with steroid hyperglycemia. ISS - increase to high dose. Lantus - increase to 20BID. Add prandial bolus. Wean steroids as able. Humulin R 10 units IV x1 given this am.         HTN -   hold HCTZ. Cont PTA inderal.        Mild PAN - suspect prerenal - started on IVF. Mild Hyperkalemia - asymptomatic. Insulin IV. IVF  Will check renal this afternoon       UTI - POA - on rocephin.    GNR culture prelim.      DVT Prophylaxis: lovenox  Diet: DIET CARB CONTROL;  Code Status: Full Code        Dispo - ok for PCU.             Odalis Shrestha MD

## 2020-05-11 NOTE — CONSULTS
Consult completed 5/9 by Tammy Longoria
EXAM:  Blood pressure (!) 127/55, pulse 76, temperature 97.8 °F (36.6 °C), temperature source Axillary, resp. rate 23, height 5' 1\" (1.549 m), weight 227 lb 15.3 oz (103.4 kg), SpO2 99 %.'  Gen: No distress. Lethargic on bpap  Eyes: PERRL. No sclera icterus. No conjunctival injection. ENT: No discharge. Pharynx clear. Neck: Trachea midline. No obvious mass. Resp: Diminished   CV: Regular rate. Regular rhythm. No murmur or rub. GI: Non-tender. Non-distended. No hernia. BS present. Skin: Warm and dry. No nodule on exposed extremities. Lymph: No cervical LAD. No supraclavicular LAD. M/S: No cyanosis. No joint deformity. Neuro: Lethargic, moans to stimuli, withdraws to pain   EXT:   1+ edema, no clubbing    LABS:  CBC:   Recent Labs     20  042   WBC 13.4*   HGB 14.0   HCT 43.9   MCV 90.7        BMP:   Recent Labs     20  0427      K 4.5   CL 95*   CO2 35*   BUN 16   CREATININE 0.8     LIVER PROFILE:   Recent Labs     207   AST 23   ALT 15   BILITOT 0.7   ALKPHOS 86     PT/INR: No results for input(s): PROTIME, INR in the last 72 hours. APTT: No results for input(s): APTT in the last 72 hours. UA:  Recent Labs     20  0611   COLORU YELLOW   PHUR 5.0   WBCUA 41*   RBCUA 1   BACTERIA 4+*   CLARITYU CLOUDY*   SPECGRAV 1.023   LEUKOCYTESUR SMALL*   UROBILINOGEN 0.2   BILIRUBINUR Negative   BLOODU SMALL*   GLUCOSEU Negative     Recent Labs     20  0544   PHART 7.211*   QUM4OBP 89.8*   PO2ART 90.3       Cultures:  None    PFTs:   Severe obstruction        ECHO:  None     AB    Chest X-ray:  Chest imaging was reviewed by me and showed vascular prominence, atelecatsis    I reviewed all the above labs and studies pertaining to this visit.       ASSESSMENT:  · Acute Hypoxic/Hypercapnic Respiratory Failure with saturations less than 90% on room air, and pH less than 0.15  · Acute Metabolic Encephalopathy   · COPD vs asthma exacerbation

## 2020-05-11 NOTE — PROGRESS NOTES
Nightly  propranolol (INDERAL LA) extended release capsule 80 mg, 80 mg, Oral, Daily  fluticasone (FLOVENT HFA) 44 MCG/ACT inhaler 1 puff, 1 puff, Inhalation, BID  cetirizine (ZYRTEC) tablet 5 mg, 5 mg, Oral, Daily PRN  ipratropium-albuterol (DUONEB) nebulizer solution 1 ampule, 1 ampule, Inhalation, Q2H PRN  ipratropium-albuterol (DUONEB) nebulizer solution 1 ampule, 1 ampule, Inhalation, Q4H While awake  sodium chloride flush 0.9 % injection 10 mL, 10 mL, Intravenous, 2 times per day  sodium chloride flush 0.9 % injection 10 mL, 10 mL, Intravenous, PRN  acetaminophen (TYLENOL) tablet 650 mg, 650 mg, Oral, Q4H PRN **OR** acetaminophen (TYLENOL) suppository 650 mg, 650 mg, Rectal, Q4H PRN  polyethylene glycol (GLYCOLAX) packet 17 g, 17 g, Oral, Daily PRN  ondansetron (ZOFRAN) injection 4 mg, 4 mg, Intravenous, Q4H PRN  glucose (GLUTOSE) 40 % oral gel 15 g, 15 g, Oral, PRN  dextrose 50 % IV solution, 12.5 g, Intravenous, PRN  glucagon (rDNA) injection 1 mg, 1 mg, Intramuscular, PRN  dextrose 5 % solution, 100 mL/hr, Intravenous, PRN  cefTRIAXone (ROCEPHIN) 2 g in sterile water 20 mL IV syringe, 2 g, Intravenous, Q24H  doxycycline (VIBRAMYCIN) 100 mg in dextrose 5 % 100 mL IVPB, 100 mg, Intravenous, Q12H    Data reviewed:  Labs:  CBC:   Recent Labs     05/09/20  0427 05/10/20  0457 05/11/20  0439   WBC 13.4* 6.3 7.5   HGB 14.0 13.4 12.8   HCT 43.9 41.8 39.2   MCV 90.7 93.3 91.0    180 193     BMP:   Recent Labs     05/10/20  0457 05/10/20  1755 05/11/20  0439   * 132* 134*   K 5.2* 5.2* 4.8   CL 91* 93* 95*   CO2 24 31 32   PHOS  --  2.4* 2.8   BUN 38* 42* 41*   CREATININE 1.4* 1.2 1.0     LIVER PROFILE:   Recent Labs     05/09/20  0427   AST 23   ALT 15   BILITOT 0.7   ALKPHOS 86     PT/INR: No results for input(s): PROTIME, INR in the last 72 hours. APTT: No results for input(s): APTT in the last 72 hours.     Cultures:   Urine culture (5/9): Klebsiella       Films:  Chest images and reports were

## 2020-05-11 NOTE — PROGRESS NOTES
distention. Trachea midline. Respiratory:  Normal respiratory effort. Clear to auscultation, bilaterally without Rales/Wheezes/Rhonchi. Cardiovascular: Regular rate and rhythm with normal S1/S2 without murmurs, rubs or gallops. Abdomen: Soft, non-tender, non-distended with normal bowel sounds. Musculoskeletal: No clubbing, cyanosis or edema bilaterally. Full range of motion without deformity. Skin: Skin color, texture, turgor normal.  No rashes or lesions. Neurologic:  Neurovascularly intact without any focal sensory/motor deficits. Cranial nerves: II-XII intact, grossly non-focal.  Psychiatric: Alert and oriented, thought content appropriate, normal insight  Capillary Refill: Brisk,< 3 seconds   Peripheral Pulses: +2 palpable, equal bilaterally       Labs:   Recent Labs     05/09/20  0427 05/10/20  0457 05/11/20  0439   WBC 13.4* 6.3 7.5   HGB 14.0 13.4 12.8   HCT 43.9 41.8 39.2    180 193     Recent Labs     05/10/20  0457 05/10/20  1755 05/11/20 0439   * 132* 134*   K 5.2* 5.2* 4.8   CL 91* 93* 95*   CO2 24 31 32   BUN 38* 42* 41*   CREATININE 1.4* 1.2 1.0   CALCIUM 8.4 8.3 8.5   PHOS  --  2.4* 2.8     Recent Labs     05/09/20 0427   AST 23   ALT 15   BILITOT 0.7   ALKPHOS 86     No results for input(s): INR in the last 72 hours. Recent Labs     05/09/20 0427   TROPONINI <0.01       Urinalysis:      Lab Results   Component Value Date    NITRU POSITIVE 05/09/2020    WBCUA 41 05/09/2020    BACTERIA 4+ 05/09/2020    RBCUA 1 05/09/2020    BLOODU SMALL 05/09/2020    SPECGRAV 1.023 05/09/2020    GLUCOSEU Negative 05/09/2020       Radiology:  XR CHEST PORTABLE   Final Result   No acute process.                  Assessment/Plan:    Active Hospital Problems    Diagnosis    COPD exacerbation ([facility] [address] [J44.1]     Priority: High    Hypercapnia [R06.89]    Acute respiratory failure with hypoxia and hypercapnia (HCC) [J96.01, O24.23]    Metabolic encephalopathy [T67.55]         Acute on Chronic Hypoxic

## 2020-05-12 NOTE — PLAN OF CARE
Problem: FALL RISK  Goal: Patient will remain free of falls  Outcome: Ongoing     Problem: Falls - Risk of:  Goal: Will remain free from falls  Description: Will remain free from falls  Outcome: Ongoing  Goal: Absence of physical injury  Description: Absence of physical injury  Outcome: Ongoing     Problem: Urinary Elimination:  Goal: Signs and symptoms of infection will decrease  Description: Signs and symptoms of infection will decrease  Outcome: Ongoing  Goal: Complications related to the disease process, condition or treatment will be avoided or minimized  Description: Complications related to the disease process, condition or treatment will be avoided or minimized  Outcome: Ongoing

## 2020-05-12 NOTE — DISCHARGE INSTR - COC
G47.33    Morbid obesity ([facility] [address] E66.01    Cholecystitis K81.9    Epigastric pain R10.13    Acute respiratory failure with hypoxia and hypercapnia (HCC) J96.01, J43.07    Metabolic encephalopathy Y90.06    Hypercapnia R06.89    Environmental allergies Z91.09    Hyperglycemia R73.9    Acute cystitis without hematuria N30.00       Isolation/Infection:   Isolation          No Isolation        Patient Infection Status     Infection Onset Added Last Indicated Last Indicated By Review Planned Expiration Resolved Resolved By    None active    Resolved    COVID-19 Rule Out 05/09/20 05/09/20 05/09/20 COVID-19 (Ordered)   05/09/20 Rule-Out Test Resulted    MRSA  08/10/14 08/10/14 Yasmine Dhillon RN   10/02/15 Yasmine Dhillon RN          Nurse Assessment:  Last Vital Signs: /68   Pulse 85   Temp 98.4 °F (36.9 °C) (Oral)   Resp 20   Ht 5' 1\" (1.549 m)   Wt 233 lb (105.7 kg)   SpO2 100%   BMI 44.02 kg/m²     Last documented pain score (0-10 scale): Pain Level: 0  Last Weight:   Wt Readings from Last 1 Encounters:   05/11/20 233 lb (105.7 kg)     Mental Status:  oriented and alert    IV Access:  - None    Nursing Mobility/ADLs:  Walking   Assisted  Transfer  Independent  Bathing  Assisted  Dressing  Independent  Toileting  Assisted  Feeding  Independent  Med Admin  Independent  Med Delivery   whole    Wound Care Documentation and Therapy:        Elimination:  Continence:   · Bowel: Yes  · Bladder: Yes  Urinary Catheter: removed 5/12/20  Colostomy/Ileostomy/Ileal Conduit: No       Date of Last BM: Prior to admission    Intake/Output Summary (Last 24 hours) at 5/12/2020 1123  Last data filed at 5/12/2020 0420  Gross per 24 hour   Intake 100 ml   Output 1600 ml   Net -1500 ml     I/O last 3 completed shifts:   In: 1078.1 [P.O.:480; I.V.:398.1; IV Piggyback:200]  Out: 1600 [Urine:1600]    Safety Concerns:     None    Impairments/Disabilities:      None    Nutrition Therapy:  Current Nutrition Therapy:   - Oral

## 2020-05-12 NOTE — PROGRESS NOTES
Occupational Therapy   Occupational Therapy Initial Assessment  Date: 2020   Patient Name: Xiomara Hilton  MRN: [id number]     : 1935    Date of Service: 2020    Discharge Recommendations:  Home with assist PRN  OT Equipment Recommendations  Equipment Needed: No    Xiomara Hilton scored a 20/24 on the AM-[facility] ADL Inpatient form. At this time, no further OT is recommended upon discharge. Recommend patient returns to prior setting with prior services. Assessment   Performance deficits / Impairments: Decreased functional mobility ; Decreased endurance;Decreased ADL status; Decreased safe awareness;Decreased balance;Decreased high-level IADLs  Assessment: 79 y/o female admitted 2020 with COPD exacerbation. COVID test negative. PTA pt lives at home with spouse and was independent with ADLs and functional mobility. Today, pt completed functional mobility around room with RW and SBA. Pt educated on how to manage O2 line with mobility and required cuing for safety. Pt declined ADLs but anticipate will require CGA/SBA at this time. Pt is functioning slightly below baseline and would benefit from skilled therapy prior to returning home with family. Prognosis: Good  Decision Making: Medium Complexity  History: see above  Assistance / Modification: RW, O2  OT Education: OT Role;Plan of Care  REQUIRES OT FOLLOW UP: Yes  Activity Tolerance  Activity Tolerance: Patient Tolerated treatment well  Safety Devices  Safety Devices in place: Yes  Type of devices: Call light within reach; Left in chair;Nurse notified           Patient Diagnosis(es): The primary encounter diagnosis was COPD exacerbation ([facility] [address] Diagnoses of Hypercapnia and Hypoxia were also pertinent to this visit.      has a past medical history of Arthritis, Asthma, Cancer ([address] Chronic kidney disease, COPD (chronic obstructive pulmonary disease) ([address] Glaucoma, Heart burn, Hyperlipidemia, Hypertension, MRSA (methicillin resistant Staphylococcus aureus) colonization, Other disorders of kidney and ureter in diseases classified elsewhere, and Type II or unspecified type diabetes mellitus without mention of complication, not stated as uncontrolled. has a past surgical history that includes back surgery; Tubal ligation; Appendectomy; other surgical history; Kidney cyst removal; Bunionectomy; Eye surgery; and Cholecystectomy, laparoscopic (01/18/2018). Restrictions  Restrictions/Precautions  Restrictions/Precautions: Fall Risk  Position Activity Restriction  Other position/activity restrictions: 2L O2 via NC    Subjective   General  Chart Reviewed: Yes  Patient assessed for rehabilitation services?: Yes  Additional Pertinent Hx: 79 y/o female admitted 5/9/2020 with COPD exacerbation. COVID test negative. Hx of DMII and renal Ca s/p left renal ablation  Family / Caregiver Present: No  Referring Practitioner: Gil Meckel, MD  Subjective  Subjective: Pt seen bedside and agreeable to therapy. General Comment  Comments: Per RN ok for therapy     Social/Functional History  Social/Functional History  Lives With: Spouse  Type of Home: House  Home Layout: Multi-level, Bed/Bath upstairs(quad level. )  Home Access: Stairs to enter with rails  Entrance Stairs - Number of Steps: 7 steps to enter with one rail. Stair lift up to bed/bath. Pt goes up/down outdoor steps to take her dog out using SPC  Bathroom Shower/Tub: Walk-in shower, Shower chair with back  Bathroom Toilet: Handicap height  Bathroom Equipment: Grab bars in shower  Home Equipment: Rolling walker, 4 wheeled walker, [employer], Sock aid, Reacher(stairlift in home. Bed rail)  ADL Assistance: Independent  Homemaking Assistance: (Pt does cooking, has a cleaning lady (but has not been coming d/t pandemic);  does grocery shopping)  Ambulation Assistance: Independent(uses cane when going up/down steps and short community distances. Pt uses 4WW for long distances.  No AD in the transfers/mobility with supervision   Short term goal 2: Pt will demo good safety awareness with O2 line during ADLs and mobility with no cuing  Short term goal 3: Pt will tolerate standing > 5 min for functional task with supervision  Short term goal 4: Pt will complete LB dressing with supervision  Short term goal 5: Pt will complete toileting with supervision  Patient Goals   Patient goals : to return home       Therapy Time   Individual Concurrent Group Co-treatment   Time In 0850         Time Out 0930         Minutes 40         Timed Code Treatment Minutes: 40 Minutes     This note to serve as OT d/c summary if pt is d/c-ed prior to next therapy session.     Katy Shafer, OTR/L

## 2020-05-12 NOTE — PROGRESS NOTES
Patient ok for discharge. Everything is almost set up, PIV taken out,veliz catheter taken out. Spoke with  will let him know when the oxygen is set up. Aware of prescriptions sent to pharmacy. Discharge instructions given. 1620: Home O2 at bedside,  will call when oxygen is delivered and will come  patient.

## 2020-05-12 NOTE — CARE COORDINATION
[facility] rep spoke with patient and spouse. Oxygen to be delivered to patient's home and then pt's  will call RN to verify patient pickup time. RN to call RT to deliver E-tank to pt's room.     Thank you for the referral.  Electronically signed by Pardeep Gonzales on 5/12/2020 at 3:38 PM Cell ph# [phone]

## 2020-05-12 NOTE — PROGRESS NOTES
Called Luci Delong [place] for home o2  .   She will take care of pt ,[facility] does accept her insurance

## 2020-05-12 NOTE — PROGRESS NOTES
Physical Therapy    Facility/Department: [address] ICU  Initial Assessment    NAME: Dottie Hills  : 1935  MRN: [id number]    Date of Service: 2020    Discharge Recommendations:  Home with assist PRN, Home with Home health PT   PT Equipment Recommendations  Equipment Needed: No   Dottie Hills scored a 20/24 on the AM-PAC short mobility form. Current research shows that an AM-PAC score of 18 or greater is typically associated with a discharge to the patient's home setting. Based on the patient's AM-PAC score and their current functional mobility deficits, it is recommended that the patient have 2-3 sessions per week of Physical Therapy at d/c to increase the patient's independence. At this time, this patient demonstrates the endurance and safety to discharge home with Home PT  and a follow up treatment frequency of 2-3x/wk. Please see assessment section for further patient specific details. If patient discharges prior to next session this note will serve as a discharge summary. Please see below for the latest assessment towards goals. Assessment   Body structures, Functions, Activity limitations: Decreased functional mobility ; Decreased endurance  Assessment: 81 y/o female admit 2020 with COPD Exac, Hypercapnia. PMH as noted including COPD, CKD, Back Surg. PTA pt living in own home with ; reports adequate assist/support upon d/c. Will recommend at least brief Home PT to ensure safe transition home and to cont functional mobility activities with regard safe O2 management. Prognosis: Good  Decision Making: Low Complexity  History: 81 y/o female admit 2020 with COPD Exac, Hypercapnia. PMH as noted including COPD, CKD, Back Surg. Exam: See above. Clinical Presentation: See above. Patient Education: Role of PT, POC, Need to call for assist, Functional Mobility with regard O2 tubing. Barriers to Learning: None.    REQUIRES PT FOLLOW UP: Yes  Activity Training, Transfer Training, Gait Training, Safety Education & Training, Patient/Caregiver Education & Training  Safety Devices  Type of devices: Call light within reach, Left in chair, Nurse notified          AM-PAC Score  AM-PAC Inpatient Mobility Raw Score : 20 (05/12/20 1220)  AM-PAC Inpatient T-Scale Score : 47.67 (05/12/20 1220)  Mobility Inpatient CMS 0-100% Score: 35.83 (05/12/20 1220)  Mobility Inpatient CMS G-Code Modifier : Sulema Ike (05/12/20 1220)          Goals  Short term goals  Time Frame for Short term goals: Upon d/c acute care setting. Short term goal 1: Transfers Supervision. Short term goal 2: Amb with/without assist device 100' SBA/Supervision. Patient Goals   Patient goals : Return home with .        Therapy Time   Individual Concurrent Group Co-treatment   Time In 0840         Time Out 0920         Minutes 1200 [facility],

## 2020-05-12 NOTE — PLAN OF CARE
Problem: FALL RISK  Goal: Patient will remain free of falls  5/12/2020 1235 by Jose Miguel Keita RN  Outcome: Completed  5/12/2020 0336 by Dyan Gillespie RN  Outcome: Ongoing     Problem: Falls - Risk of:  Goal: Will remain free from falls  Description: Will remain free from falls  5/12/2020 1235 by Jose Miguel Keita RN  Outcome: Completed  5/12/2020 0336 by Dyan Gillespie RN  Outcome: Ongoing  Goal: Absence of physical injury  Description: Absence of physical injury  5/12/2020 1235 by Jose Miguel Keita RN  Outcome: Completed  5/12/2020 0336 by Dyan Gillespie RN  Outcome: Ongoing     Problem: Urinary Elimination:  Goal: Signs and symptoms of infection will decrease  Description: Signs and symptoms of infection will decrease  5/12/2020 1235 by Jose Miguel Keita RN  Outcome: Completed  5/12/2020 0336 by Dyan Gillespie RN  Outcome: Ongoing  Goal: Complications related to the disease process, condition or treatment will be avoided or minimized  Description: Complications related to the disease process, condition or treatment will be avoided or minimized  5/12/2020 1235 by Jose Miguel Keita RN  Outcome: Completed  5/12/2020 0336 by Dyan Gillespie RN  Outcome: Ongoing

## 2020-05-13 NOTE — CARE COORDINATION
Patient contacted regarding Malachi Hampton. Care Transition Nurse/ Ambulatory Care Manager contacted the patient by telephone to perform post discharge assessment. Verified name and  with patient as identifiers. Provided introduction to self, and explanation of the CTN/ACM role, and reason for call due to risk factors for infection and/or exposure to COVID-19. Symptoms reviewed with patient who verbalized the following symptoms: fatigue. Due to no new or worsening symptoms encounter was not routed to provider for escalation. Patient has following risk factors of: COPD and diabetes. CTN/ACM reviewed discharge instructions, medical action plan and red flags such as increased shortness of breath, increasing fever and signs of decompensation with patient who verbalized understanding. Discussed exposure protocols and quarantine with CDC Guidelines What to do if you are sick with coronavirus disease 2019.  Patient was given an opportunity for questions and concerns. The patient agrees to contact  PCP office for questions related to their healthcare. CTN/ACM provided contact information for future needs. Reviewed and educated patient on any new and changed medications related to discharge diagnosis     Patient/family/caregiver given information for [facility] and agrees to enroll no  Patient's preferred e-mail:   Patient's preferred phone number:    Patient stated BG this morning was 79. Patient stated she was doing \"fine\" and her breathing was OK. Plan for follow-up call in 5-7 days based on severity of symptoms and risk factors.

## 2020-06-08 NOTE — PROGRESS NOTES
Function Testing 2013  Overall suggests severe obstruction but restriction was also present     Chest imaging from 5/2020 is reviewed. My interpretation is no acute process     ECHO  Nothing recent    ABG 5/2020  7.21/90/90  Lab Results   Component Value Date    WBC 7.1 05/12/2020    HGB 12.6 05/12/2020    HCT 38.6 05/12/2020    MCV 90.6 05/12/2020     05/12/2020       No results found for: BNP    Lab Results   Component Value Date    CREATININE 0.9 05/12/2020    BUN 39 (H) 05/12/2020     05/12/2020    K 4.6 05/12/2020     05/12/2020    CO2 31 05/12/2020       I reviewed above labs and studies pertinent to this visit and date    Assessment/Plan:  1. Uncomplicated asthma, unspecified asthma severity, unspecified whether persistent  Says she is fine on flovent and albuterol. Not sure if that is true as her recent admission for hypercapnia would say otherwise. Need to be vigilant about the need for stepping up to ICS-LABA. She sees no need to change now    2. Restrictive lung disease  Due to weight. I have low index of suspicion for an ILD    3. 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 She does not feel she needs a PSG right now. She thinks she will be ok. She is on 2 liters of oxygen with exertion and sleep. She does get benefit from it but it is hard for her to use. I think she has NANCY/OHS and would benefit from NIV. She is hesitant. If she gets admitted again she will need the PSG no questions.   She understood      Follow up in 3 months

## 2020-06-14 NOTE — ED PROVIDER NOTES
by:  [facility]  [address] [place] [address]   Phone [phone]   LACTATE, SEPSIS       ? EKG  EKG Interpretation    Interpreted by emergency department physician  Time read: 7595    Rhythm: Sinus  Ventricular Rate: 88  QRS Axis: 17  Ectopy: None  Conduction: Normal sinus rhythm with low voltage QRS  ST Segments: Normal  T Waves: Normal  Q Waves: Lead III only    Other findings: Mild motion artifact that EKG is readable    Compared to EKG on: 5/9/2020    Clinical Impression: Normal sinus rhythm with low voltage QRS and T waves in lead III only. There is mild motion artifact but EKG is readable. Emmanuelle Palmer      RADIOLOGY/PROCEDURES  I personally reviewed the images for this case. CT CHEST PULMONARY EMBOLISM W CONTRAST   Final Result   Negative for acute pulmonary embolism. Mild interstitial pulmonary edema. Moderate multivessel coronary calcifications. Heterogeneous enlarged right thyroid lobe. Recommend follow-up thyroid   ultrasound. XR CHEST PORTABLE   Final Result   No acute findings. NARCOTIC REVIEW      COURSE & MEDICAL DECISION MAKING  Pertinent Labs, EKG, & Imaging studies reviewed.  (See chart for details)    Vitals:    06/14/20 0115 06/14/20 0130 06/14/20 0441 06/14/20 0447   BP: 129/68      Pulse: 90 84     Resp: 19 24 25 22   Temp:       TempSrc:       SpO2: 100% 100% 100% 99%   Weight:       Height:           Medications   doxycycline hyclate (VIBRA-TABS) tablet 100 mg (has no administration in time range)   albuterol (PROVENTIL) nebulizer solution 5 mg (5 mg Nebulization Given 6/14/20 0057)   iopamidol (ISOVUE-370) 76 % injection 75 mL (75 mLs Intravenous Given 6/14/20 0338)   albuterol (PROVENTIL) nebulizer solution 5 mg (5 mg Nebulization Given 6/14/20 0441)       New Prescriptions    No medications on file       SEP-1 CORE MEASURE DATA  Exclusion criteria: the patient is NOT to be included for

## 2020-06-16 NOTE — CARE COORDINATION
Patient contacted regarding Achilles Paling. Discussed COVID-19 related testing which was available at this time. Test results were negative. Patient informed of results, if available? Patient had results already. Care Transition Nurse/ Ambulatory Care Manager contacted the patient by telephone to perform post discharge assessment. Verified name and  with patient as identifiers. Provided introduction to self, and explanation of the CTN/ACM role, and reason for call due to risk factors for infection and/or exposure to COVID-19. Symptoms reviewed with patient who verbalized the following symptoms: no new or worsening symptoms. Due to no new or worsening symptoms encounter was not routed to provider for escalation. Discussed follow-up appointments. If no appointment was previously scheduled, appointment scheduling offered: [facility] follow up appointment(s):   Future Appointments   Date Time Provider Jimbo Mares   2020  2:40 PM David Loyola, [facility] PULM [facility]     Non-[facility] follow up appointment(s): N/A     Patient has following risk factors of: COPD and asthma. CTN/ACM reviewed discharge instructions, medical action plan and red flags such as increased shortness of breath, increasing fever and signs of decompensation with patient who verbalized understanding. Discussed exposure protocols and quarantine with CDC Guidelines What to do if you are sick with coronavirus disease .  Patient was given an opportunity for questions and concerns. The patient agrees to contact the [facility] exposure line [phone], local health department took writer's contact info only. and PCP office for questions related to their healthcare. CTN/ACM provided contact information for future needs.     Reviewed and educated patient on any new and changed medications related to discharge diagnosis     Patient/family/caregiver given information for [facility] and agrees to enroll no  Patient's preferred

## 2020-07-24 NOTE — CARE COORDINATION
Patient resolved from the Care Transitions episode on 07/24/2020  Discussed COVID-19 related testing which was available at this time. Test results were negative. Patient informed of results, if available? yes    Patient/family has been provided the following resources and education related to COVID-19:                         Signs, symptoms and red flags related to COVID-19            CDC exposure and quarantine guidelines            [facility] exposure contact - [phone]            Contact for their local Department of Health                 Patient currently reports that the following symptoms have improved:  no new or worsening symptoms. No further outreach scheduled with this CTN/ACM. Episode of Care resolved. Patient has this CTN/ACM contact information if future needs arise.

## 2020-09-22 PROBLEM — G93.40 ACUTE ENCEPHALOPATHY: Status: ACTIVE | Noted: 2020-01-01

## 2020-09-22 PROBLEM — E87.29 RESPIRATORY ACIDOSIS: Status: ACTIVE | Noted: 2020-01-01

## 2020-09-22 NOTE — ED TRIAGE NOTES
Pt arrived to dept via EMS. Pt's  called EMS because the pt was \"out of it\".  reports giving the pt half of a Vicodin. Narcan 4 mg administered en route. Pt difficult to rouse and disoriented. Pinpoint pupils with reaction to light. Pt placed in gown and on cardiac monitor. Will continue to monitor.

## 2020-09-22 NOTE — H&P
mupirocin (BACTROBAN) 2 % ointment Apply topically 3 times daily Apply topically 3 times daily. Historical Provider, MD   triamcinolone (KENALOG) 0.1 % cream Bid rash 5/4/20   Historical Provider, MD   cetirizine (ZYRTEC) 10 MG tablet Take 5-10 mg by mouth daily as needed for Allergies    Historical Provider, MD   albuterol (PROVENTIL) (5 MG/ML) 0.5% nebulizer solution Take 1 mL by nebulization 4 times daily as needed for Wheezing 10/4/15   Rudy Amaya MD       Allergies:  Ace inhibitors; Atorvastatin; Ciprofloxacin; Ciprofloxacin in d5w; Codeine; Fenofibrate; Glipizide; Macrobid [nitrofurantoin monohyd macro]; Nitrofurantoin; Pioglitazone; Pravastatin; Simvastatin; Xalatan [latanoprost]; Azithromycin; Budesonide-formoterol fumarate; Misoprostol; Pcn [penicillins]; and Sulfa antibiotics    Social History:  The patient currently lives home    TOBACCO:   reports that she quit smoking about 20 years ago. Her smoking use included cigarettes. She has never used smokeless tobacco.  ETOH:   reports no history of alcohol use. Family History:  Reviewed in detail and negative for DM, Early CAD, Cancer, CVA. Positive as follows:    History reviewed. No pertinent family history. REVIEW OF SYSTEMS:  as noted in the HPI. All other systems reviewed and negative. PHYSICAL EXAM:    BP (!) 133/90   Pulse 79   Temp 98.3 °F (36.8 °C) (Oral)   Resp 20   Wt 229 lb 15 oz (104.3 kg)   SpO2 100%   BMI 44.91 kg/m²     General appearance: Wearing BiPAP, responsive to verbal stimulus but incoherent and still drifts in and out of full consciousness  HEENT Normal cephalic, atraumatic without obvious deformity. Pupils equal, round, and reactive to light. Extra ocular muscles intact. Conjunctivae/corneas clear.   Dry mucous membranes  Neck: Supple, no JVD  Lungs: Diminished all lung fields, wheezing mostly noted to the lower lung fields  Heart: Regular rate and rhythm with Normal S1/S2 without murmurs, rubs or gallops, point of maximum impulse non-displaced  Abdomen: Soft, non-tender or non-distended without rigidity or guarding and positive bowel sounds all four quadrants. Extremities: No clubbing, cyanosis, or edema bilaterally. Full range of motion without deformity and normal gait intact. Skin: Skin color, texture, turgor normal.  No rashes or lesions. Neurologic: Alert and oriented X 3, neurovascularly intact with sensory/motor intact upper extremities/lower extremities, bilaterally. Cranial nerves: II-XII intact, grossly non-focal.  Mental status: Alert, oriented, thought content appropriate. Capillary Refill: Acceptable  < 3 seconds  Peripheral Pulses: +3 Easily felt, not easily obliterated with pressure      CXR: No acute process  CT head noncontrast: No acute process and chronic sphenoid sinus disease noted    CBC   Recent Labs     09/22/20  1711   WBC 8.2   HGB 11.6*   HCT 36.3         RENAL  Recent Labs     09/22/20  1711      K 4.8   CL 90*   CO2 44*   BUN 18   CREATININE 0.8     LFT'S  Recent Labs     09/22/20  1711   AST 16   ALT 11   BILIDIR <0.2   BILITOT 0.8   ALKPHOS 82     COAG  No results for input(s): INR in the last 72 hours.   CARDIAC ENZYMES  Recent Labs     09/22/20  1711   TROPONINI <0.01       U/A:    Lab Results   Component Value Date    COLORU YELLOW 09/22/2020    WBCUA 1 09/22/2020    RBCUA 6 09/22/2020    BACTERIA 4+ 05/09/2020    CLARITYU Clear 09/22/2020    SPECGRAV 1.028 09/22/2020    LEUKOCYTESUR Negative 09/22/2020    BLOODU TRACE 09/22/2020    GLUCOSEU >=1000 09/22/2020    AMORPHOUS 1+ 11/17/2014       ABG    Lab Results   Component Value Date    OLO3EBQ 41.5 09/22/2020    BEART 10.7 09/22/2020    W9PZSHKY 98.3 09/22/2020    PHART 7.227 09/22/2020    JOP1LTL 99.8 09/22/2020    PO2ART 184.0 09/22/2020    RWP4DPO 44.6 09/22/2020           Active Hospital Problems    Diagnosis Date Noted    Acute encephalopathy [G93.40] 09/22/2020     Priority: High    Respiratory failure with hypercapnia ([facility] [J96.92]      Priority: High    [facility] [J44.1] 08/07/2014     Priority: High    Respiratory acidosis [E87.2] 09/22/2020    Morbid obesity ([facility] [E66.01] 10/01/2015    DM (diabetes mellitus) ([facility] [E11.9] 08/11/2014    NANCY (obstructive sleep apnea) [G47.33] 08/11/2014    Asthma exacerbation [J45.901] 08/09/2014         PHYSICIANS CERTIFICATION:    I certify that Shavonne Gilbert is expected to be hospitalized for greater than 2 midnights based on the following assessment and plan:      ASSESSMENT/PLAN:  · Continue BiPAP therapy at this time, ABG intermittently to monitor for improvement in acid-base status as well as hypercarbia  · N.p.o. at this time, every 4 Accu-Cheks to monitor for hyper and hypoglycemia  · Given Solu-Medrol 125 mg in the ED, started on Solu-Medrol 60 mg every 6 hours, started on Rocephin for COPD antibiotic prophylaxis  · Repeat labs pending      DVT Prophylaxis: Lovenox  Diet: No diet orders on file  Code Status: Prior  PT/OT Eval Status: Uncertain    Dispo -pending clinical course       Maycol Stevenson DO    Thank you Rao Hnanah MD for the opportunity to be involved in this patient's care. If you have any questions or concerns please feel free to contact me at [phone].

## 2020-09-22 NOTE — ED PROVIDER NOTES
[address] [facility]      Pt Name: Gabriel Hamm  MRN: [id number]  [place] 1935  Date of evaluation: 9/22/2020  Provider: Sina Fay MD    CHIEF COMPLAINT       Chief Complaint   Patient presents with    Altered Mental Status     Pt's  called EMS because the pt wasn't acting right. Narcan 4 mg given on the scene. HISTORY OF PRESENT ILLNESS   (Location/Symptom, Timing/Onset,Context/Setting, Quality, Duration, Modifying Factors, Severity)  Note limiting factors. Gabriel Hamm is a 80 y.o. female who presents to the emergency department with AMS. Has pertinent h/o hypercapnic respiratory failure, takes Vicodin. Found down by , minimally responsive per EMS with slight improving mentation en route after Narcan administered. No reports of fevers, pain anywhere, pt's mental status limits rest of history taking. NursingNotes were reviewed. REVIEW OF SYSTEMS    (2-9 systems for level 4, 10 or more for level 5)     UTO 2/2 mental status.       PAST MEDICAL HISTORY     Past Medical History:   Diagnosis Date    Arthritis     Asthma     Cancer ([address]     kidney    Chronic kidney disease     COPD (chronic obstructive pulmonary disease) ([address]     Glaucoma     Heart burn     Hyperlipidemia     Hypertension     MRSA (methicillin resistant Staphylococcus aureus) colonization 8/8/14    Other disorders of kidney and ureter in diseases classified elsewhere     Type II or unspecified type diabetes mellitus without mention of complication, not stated as uncontrolled          SURGICALHISTORY       Past Surgical History:   Procedure Laterality Date    APPENDECTOMY      BACK SURGERY      BUNIONECTOMY      CHOLECYSTECTOMY, LAPAROSCOPIC  01/18/2018    EYE SURGERY      KIDNEY CYST REMOVAL      OTHER SURGICAL HISTORY      CT guided left renal mass microwave ablation     TUBAL LIGATION           CURRENT MEDICATIONS None    Years of education: None    Highest education level: None   Occupational History    None   Social Needs    Financial resource strain: None    Food insecurity     Worry: None     Inability: None    Transportation needs     Medical: None     Non-medical: None   Tobacco Use    Smoking status: Former Smoker     Types: Cigarettes     Last attempt to quit: 1999     Years since quittin.7    Smokeless tobacco: Never Used   Substance and Sexual Activity    Alcohol use: No    Drug use: No    Sexual activity: Yes     Partners: Male   Lifestyle    Physical activity     Days per week: None     Minutes per session: None    Stress: None   Relationships    Social connections     Talks on phone: None     Gets together: None     Attends [employer] service: None     Active member of club or organization: None     Attends meetings of clubs or organizations: None     Relationship status: None    Intimate partner violence     Fear of current or ex partner: None     Emotionally abused: None     Physically abused: None     Forced sexual activity: None   Other Topics Concern    None   Social History Narrative    None       SCREENINGS             PHYSICAL EXAM    (up to 7 for level 4, 8 or more for level 5)     ED Triage Vitals [20 1538]   BP Temp Temp Source Pulse Resp SpO2 Height Weight   (!) 169/75 98.3 °F (36.8 °C) Oral 91 24 94 % -- 229 lb 15 oz (104.3 kg)       General: Obtunded but somewhat arousable, incomprehensible words and phrases to verbal stimuli, opens eyes to voice, follows commands. Eye: Normal conjunctiva. Pupils nearly pinpoint but reactive briskly bilaterally to light. HENT: Oral mucosa is moist.  Respiratory: Tachypneic with accessory muscle usage, lungs with diminished breath sounds bilaterally in all fields. Expiratory wheezing appreciated bilaterally. Cardiovascular: Normal rate, Regular rhythm. Gastrointestinal: Soft, obese, non-tender, Non-distended.   Musculoskeletal: No within normal limits    Narrative:     Performed at:  [facility]  [address] [facility] [place] [address]   Phone [phone]   URINE RT REFLEX TO CULTURE - Abnormal; Notable for the following components:    Glucose, Ur >=1000 (*)     Blood, Urine TRACE (*)     Protein,  (*)     All other components within normal limits    Narrative:     Performed at:  [facility]  [address] [facility] [place] [address]   Phone [phone]   BLOOD GAS, VENOUS - Abnormal; Notable for the following components:    pH, Ilan 7.189 (*)     pCO2, Ilan >120.0 (*)     All other components within normal limits    Narrative:     Rise Flood tel. [phone],  Chemistry results called to and read back by [address] RN. , 09/22/2020  17:32, by [facility]  Performed at:  [facility]  [address] [facility] [place] [address]   Phone [phone]   BRAIN NATRIURETIC PEPTIDE - Abnormal; Notable for the following components:    Pro-BNP 1,090 (*)     All other components within normal limits    Narrative:     Performed at:  [facility]  [address] [facility] [place] [address]   Phone [phone]   SALICYLATE LEVEL - Abnormal; Notable for the following components:    Salicylate, Serum <2.0 (*)     All other components within normal limits    Narrative:     Performed at:  [facility]  [address] [facility] [place] [address]   Phone [phone]   ACETAMINOPHEN LEVEL - Abnormal; Notable for the following components:    Acetaminophen Level <5 (*)     All other components within normal limits    Narrative:     Performed at:  [address] [facility] [place] [address]   Phone [phone]   MICROSCOPIC URINALYSIS - Abnormal; Notable for the following components:    RBC, UA 6 (*)     All other components within normal limits    Narrative:     Performed at:  [facility]  [address] [facility] [place] [address]   Phone [phone]   BLOOD GAS, VENOUS - Abnormal; Notable for the following components:    pH, Ilan 7.184 (*)     pCO2, Ilan >120.0 (*)     All other components within normal limits    Narrative:     Silvia Ferreira tel. [phone],  Chemistry results called to and read back by Lay Monreal RN,, 2020  19:12, by Tam Galan  Performed at:  [facility]  [address] [facility] [place] [address] [address]   Phone [phone]   CULTURE, BLOOD 1   CULTURE, BLOOD 2   HEPATIC FUNCTION PANEL    Narrative:     Performed at:  [address] [place] [address]   Phone [phone]   URINE DRUG SCREEN    Narrative:     Performed at:  [address] [facility] [place] [address]   Phone [phone]   LACTIC ACID, PLASMA    Narrative:     Performed at:  [place] [address], [facility] [place] [address] [address]   Phone [phone]   TROPONIN    Narrative:     Performed at:  [facility]  [address] [place] [address] [employer] [employer] [place] [place] [address]   Phone [phone]   ETHANOL    Narrative:     Performed at:  [facility]  [facility] [address] [facility] [employer] [address] [address]   Phone [phone]   LACTATE, SEPSIS   LACTATE, SEPSIS   BLOOD GAS, ARTERIAL       All other labs were within normal range or not returned as of this dictation.     EMERGENCY DEPARTMENT COURSE and DIFFERENTIAL DIAGNOSIS/MDM:   Vitals:    Vitals:    20 1538   BP: (!) 169/75   Pulse: 91   Resp: 24   Temp: 98.3 °F (36.8 °C)   TempSrc: Oral   SpO2: 94%   Weight: 229 lb 15 oz (104.3 kg)         Medical decision makin-year-old female with history of NANCY, likely obesity hypoventilation syndrome, COPD who presents obtunded and tachypneic. Given Narcan in the field with only minimal improvement, given tachypnea, do not suspect significant opioid toxicity. Her lung sounds are diminished all throughout, likely consistent with COPD exacerbation. Placed on BiPAP as obtundation is likely secondary to CO2 narcosis. In line duo nebs given, methylprednisolone given IV. Very difficult to obtain peripheral access on Ms. Juancarlos Kauffman given her obese habitus and very deep peripheral veins on bedside ultrasound, peripheral access attempted to be obtained with bedside ultrasound without avail by multiple providers. Given lack of IV access and patient's critical condition, requires central line. Central line placed in the right femoral vein as patient is encephalopathic on BiPAP and will not tolerate Trendelenburg position. Labs and imaging as ordered, tox work-up, CT brain to rule out other cause of patient's altered mental status. Access obtained, confirmed patient with hypercapnic respiratory failure with blood gas. pH 7.1, PCO2 above 120. Chest x-ray negative acute. Given patient's pH and critical respiratory illness, started on broad-spectrum antibiotics. On reassessment, patient with marked improvement in mentation, able to answer questions yes and no while on the BiPAP mask, more briskly responsive to voice, localizing, gesticulating coherently and following commands. CT negative acute. ABG with improving PCO2, 99 on recheck. Admitted to the ICU, went up in improved respiratory condition on BiPAP.     Medications   vancomycin (VANCOCIN) 1500 mg in dextrose 5 % 250 mL IVPB (1,500 mg Intravenous New Bag 9/22/20 1905)   albuterol (PROVENTIL) nebulizer solution 7.5 mg (7.5 mg Nebulization Given 9/22/20 1944)   0.9 % sodium chloride bolus (0 mLs Intravenous Stopped 9/22/20 1905)   methylPREDNISolone sodium (SOLU-MEDROL) injection 125 mg (125 mg Intravenous Given 9/22/20 1727)   ipratropium-albuterol (DUONEB) nebulizer solution 3 ampule (3 ampules Inhalation Given 9/22/20 1625)   cefepime (MAXIPIME) 2 g IVPB minibag (2 g Intravenous New Bag 9/22/20 1927)   lactated ringers infusion 3,129 mL (3,129 mLs Intravenous New Bag 9/22/20 1921)         CRITICAL CARE TIME   Total Critical Care time was at least 45 minutes, excluding separately reportable procedures. There was a high probability of clinically significant/life threatening deterioration in the patient's condition which required my urgent intervention. Management of noninvasive positive pressure ventilation, multiple reassessments of patient's mental status, hemodynamic status, respiratory status. Termination of blood gas, EKGs, labs, x-ray, discussion with inpatient team.    PROCEDURES:  Central Line    Date/Time: 9/23/2020 1:48 AM  Performed by: Laura De La Torre MD  Authorized by: Dominick Ríos DO     Consent:     Consent obtained:  Emergent situation  Pre-procedure details:     Hand hygiene: Hand hygiene performed prior to insertion      Sterile barrier technique: All elements of maximal sterile technique followed      Skin preparation:  2% chlorhexidine    Skin preparation agent: Skin preparation agent completely dried prior to procedure    Anesthesia (see MAR for exact dosages): Anesthesia method:  Local infiltration    Local anesthetic:  Lidocaine 1% w/o epi  Procedure details:     Location:  R femoral    Site selection rationale:  Pt unable to be placed in Trendelenburg as encephaolpathic on BiPAP    Patient position:  Flat    Procedural supplies:  Triple lumen    Catheter size:  7 Fr    Landmarks identified: yes      Ultrasound guidance: yes      Sterile ultrasound techniques: Sterile gel and sterile probe covers were used      Number of attempts:  1    Successful placement: yes    Post-procedure details:     Post-procedure:  Dressing applied and line sutured    Assessment:  Blood return through all ports and free fluid flow    Patient tolerance of procedure:   Tolerated well, no immediate complications             FINAL IMPRESSION      1. Acute on chronic respiratory failure with hypercapnia (HCC)    2. Encephalopathy acute    3. CO2 narcosis          DISPOSITION/PLAN   DISPOSITION  Admitted to ICU.            (Please note that portions of this note were completed with a voice recognition program.Efforts were made to edit the dictations but occasionally words are mis-transcribed.)    Alayna Moon MD (electronically signed)  Attending Emergency Physician          Alayna Moon MD  09/23/20 5424

## 2020-09-22 NOTE — ED NOTES
Provider notified of pH 7.184, pCO2 greater than 120, no total CO2 and no HCO3. No new orders at this time.       Joanne Roe RN  09/22/20 7349

## 2020-09-22 NOTE — ED NOTES
Provider notified that pt's pCO2 is greater than 120 and there is no total CO2 or HCO3 due to the low pH. Orders given to collect another VBG.       [address], RN  09/22/20 2621

## 2020-09-22 NOTE — ED NOTES
Marixa Velasquez-    Home [phone] cell- [phone]  Call him with any updates      Humberto Caldera RN  09/22/20 7549

## 2020-09-22 NOTE — ED NOTES
Fall risk screening completed. Fall risk bracelet applied to patient. Non-skid socks provided and placed on patient. The fall risk is indicated using  dome light . Based on score, a bed alarm was indicated and applied. The call light is within the patient's reach, and instructions for use were provided. The bed is in the lowest position with wheels locked. The patient has been advised to notify staff, using the call light, if there is a need to get up or use restroom. The patient verbalized understanding of safety precautions and how to contact staff for assistance.         Enedelia Storm RN  09/22/20 7628

## 2020-09-23 NOTE — PROGRESS NOTES
4 Eyes Skin Assessment     The patient is being assess for  Transfer to New Unit    I agree that 2 RN's have performed a thorough Head to Toe Skin Assessment on the patient. ALL assessment sites listed below have been assessed. Areas assessed by both nurses:   [x]   Head, Face, and Ears   [x]   Shoulders, Back, and Chest  [x]   Arms, Elbows, and Hands   [x]   Coccyx, Sacrum, and IschIum  [x]   Legs, Feet, and Heels        Does the Patient have Skin Breakdown?   No         Ryan Prevention initiated:  Yes   Wound Care Orders initiated:  No      [facility] nurse consulted for Pressure Injury (Stage 3,4, Unstageable, DTI, NWPT, and Complex wounds), New and Established Ostomies:  No      Nurse 1 eSignature: Electronically signed by Ariela Barksdale RN on 9/23/20 at 12:58 PM EDT    **SHARE this note so that the co-signing nurse is able to place an eSignature**    Nurse 2 eSignature: Electronically signed by Terrance Anne RN on 9/23/20 at 11:03 PM EDT

## 2020-09-23 NOTE — CONSULTS
REASON FOR CONSULTATION/CC: acute hypercapnic respiratory failure      Consult at request of Ave Denver, MD for :     PCP: Crista Gowers, MD  Established Pulmonologist: Fito Lopez      HISTORY OF PRESENT ILLNESS: Mariposa Teran is a 80y.o. year old female with a history of asthma / copd who presents with :     She was last admitted for acute hypoxemic /hypercapnic respiratory failure treated with bipap and Solumedrol for aecopd. She has a hx of chronic pain who takes vicodin who was found down and unresponsive. She did not respond to narcan. She was started on bipap. BiPAP versus NIV was discussed as an outpatient by her pulmonologist in June 2020 after her last admission. However, she refused to do a sleep study for sleep apnea and refused to step up on her breathing medication. She also refused to use the machine at that time. She states she was in her normal status of health recently but appears to be mildly confused. She does not remember admission. PAST MEDICAL HISTORY:  Past Medical History:   Diagnosis Date    Arthritis     Asthma     Cancer ([facility] [address]     kidney    Chronic kidney disease     COPD (chronic obstructive pulmonary disease) ([facility] [address]     Glaucoma     Heart burn     Hyperlipidemia     Hypertension     MRSA (methicillin resistant Staphylococcus aureus) colonization 8/8/14    Other disorders of kidney and ureter in diseases classified elsewhere     Type II or unspecified type diabetes mellitus without mention of complication, not stated as uncontrolled        PAST SURGICAL HISTORY:  Past Surgical History:   Procedure Laterality Date    APPENDECTOMY      BACK SURGERY      BUNIONECTOMY      CHOLECYSTECTOMY, LAPAROSCOPIC  01/18/2018    EYE SURGERY      KIDNEY CYST REMOVAL      OTHER SURGICAL HISTORY      CT guided left renal mass microwave ablation     TUBAL LIGATION         FAMILY HISTORY:  family history is not on file. Noncontributory.       SOCIAL HISTORY:   reports that she quit smoking about 20 years ago. Her smoking use included cigarettes. She has never used smokeless tobacco.    Scheduled Meds:   magnesium sulfate  4 g Intravenous Once    insulin lispro  0-18 Units Subcutaneous TID WC    insulin lispro  0-9 Units Subcutaneous Nightly    insulin glargine  5 Units Subcutaneous Daily    doxycycline hyclate  100 mg Oral 2 times per day    sodium chloride flush  10 mL Intravenous 2 times per day    enoxaparin  40 mg Subcutaneous Daily    ipratropium-albuterol  1 ampule Inhalation Q4H WA    methylPREDNISolone  60 mg Intravenous Q6H    hydroCHLOROthiazide  25 mg Oral Daily    rosuvastatin  5 mg Oral Nightly       Continuous Infusions:   dextrose         PRN Meds:  glucose, dextrose, glucagon (rDNA), dextrose, albuterol, sodium chloride flush, acetaminophen **OR** acetaminophen, ondansetron    ALLERGIES:  Patient is allergic to ace inhibitors; atorvastatin; ciprofloxacin; ciprofloxacin in d5w; codeine; fenofibrate; glipizide; macrobid [nitrofurantoin monohyd macro]; nitrofurantoin; pioglitazone; pravastatin; simvastatin; xalatan [latanoprost]; azithromycin; budesonide-formoterol fumarate; misoprostol; pcn [penicillins]; and sulfa antibiotics. REVIEW OF SYSTEMS:  Constitutional: Negative for fever   HENT: Negative for sore throat  Eyes: Negative for redness   Respiratory: Negative for dyspnea, cough  Cardiovascular: Negative for chest pain  Gastrointestinal: Negative for vomiting, diarrhea   Genitourinary: Negative for hematuria   Musculoskeletal: Negative for arthralgias   Skin: Negative for rash  Neurological: Negative for syncope  Hematological: Negative for adenopathy  Psychiatric/Behavorial: Negative for anxiety    Objective:   PHYSICAL EXAM:  Blood pressure (!) 141/44, pulse 82, temperature 98.6 °F (37 °C), temperature source Oral, resp.  rate 20, height 5' (1.524 m), weight 237 lb 3.4 oz (107.6 kg), SpO2 100 %, not currently Chest w/ contrast: No results found for this or any previous visit. CT Chest w/o contrast: No results found for this or any previous visit. CTPA:   Results for orders placed during the hospital encounter of 06/14/20   CT CHEST PULMONARY EMBOLISM W CONTRAST    Narrative EXAMINATION:  CTA OF THE CHEST 6/14/2020 3:38 am    TECHNIQUE:  CTA of the chest was performed after the administration of intravenous  contrast.  Multiplanar reformatted images are provided for review. MIP  images are provided for review. Dose modulation, iterative reconstruction,  and/or weight based adjustment of the mA/kV was utilized to reduce the  radiation dose to as low as reasonably achievable. COMPARISON:  Chest radiographs 06/14/2020, 05/09/2020    HISTORY:  ORDERING SYSTEM PROVIDED HISTORY: Dyspnea, negative chest x-ray. TECHNOLOGIST PROVIDED HISTORY:  Reason for exam:->Dyspnea, negative chest x-ray. Reason for Exam: dyspnea, negative cxr    FINDINGS:  Pulmonary Arteries: Pulmonary arteries are adequately opacified for  evaluation. No evidence of intraluminal filling defect to suggest pulmonary  embolism. Main pulmonary artery is upper normal in caliber. Mediastinum: No enlarged lymph nodes. Normal caliber thoracic aorta without  evidence of aneurysm or dissection. Normal heart size and pericardium. Moderate multivessel coronary calcifications. Small hiatal hernia. Heterogeneous enlarged right thyroid lobe. Lungs/pleura: No pneumothorax, pleural effusion or consolidative airspace  disease. Smooth interlobular septal thickening at the lung bases and lung  apices. Mild diffuse bronchial wall thickening. Upper Abdomen: Status post cholecystectomy. Soft Tissues/Bones: No acute soft tissue or osseous abnormality. Thoracic  spondylosis. Impression Negative for acute pulmonary embolism. Mild interstitial pulmonary edema. Moderate multivessel coronary calcifications.     Heterogeneous enlarged right thyroid lobe. Recommend follow-up thyroid  ultrasound. CXR PA/LAT:   Results for orders placed during the hospital encounter of 01/03/19   XR CHEST STANDARD (2 VW)    Narrative EXAMINATION:  TWO VIEWS OF THE CHEST    1/3/2019 1:37 am    COMPARISON:  07/21/2017    HISTORY:  ORDERING SYSTEM PROVIDED HISTORY: cough  TECHNOLOGIST PROVIDED HISTORY:  Reason for exam:->cough  Ordering Physician Provided Reason for Exam: cough, hx asthma  Acuity: Acute  Type of Exam: Initial    FINDINGS:  The cardiomediastinal silhouette is mildly prominent in size and contour. The lungs are clear. No pleural effusion or pneumothorax is present. Multilevel spondylosis involving the thoracic spine. Impression No acute cardiopulmonary process         CXR portable:   Results for orders placed during the hospital encounter of 09/22/20   XR CHEST PORTABLE    Narrative EXAMINATION:  ONE XRAY VIEW OF THE CHEST    9/22/2020 3:49 pm    COMPARISON:  CTA PA, 06/14/2020    HISTORY:  ORDERING SYSTEM PROVIDED HISTORY: altered mental status, decreased bilateral  breath sounds, COPD  TECHNOLOGIST PROVIDED HISTORY:  Reason for exam:->altered mental status, decreased bilateral breath sounds,  COPD  Reason for Exam: ams; descrease bilateral breathing sounds; COPD  Acuity: Acute  Type of Exam: Initial    FINDINGS:  The patient is rotated slightly. The cardiac silhouette, mediastinal hilar contours are stable. There is no  focal airspace disease. No pneumothorax. Impression No acute cardiopulmonary disease is appreciated. Assessment:     Acute hypercapnic respiratory failure  Encephalopathy acute  Hx NANCY  COPD vs Asthma, FEV1 47% TLC 78%  Chronic hypoxemic respiratory failure, 2L [place]  Obesity , Body mass index is 46.33 kg/m². Plan:        -she again presents with altered mental status with acute on chronic hypercapnic respiratory failure.   This is likely secondary to her asthma/COPD along with obesity with

## 2020-09-23 NOTE — PROGRESS NOTES
Comprehensive Nutrition Assessment    Type and Reason for Visit:  Initial, Positive Nutrition Screen(wt loss, decreased po, swallowing difficulty at times)    Nutrition Recommendations/Plan:   Monitor need to add supplementation    Nutrition Assessment:  Pt with pmh of asthma, COPD, CKD, HLD, HTN, DM, adm r/t lethargy. Found to have hypercapneic respiratory failure r/t asthma vs COPD. Pt with some confusion so information taken from [address] record. Diet just advanced to ccc / RONNELL. Pt has not had a chance to eat yet. Will monitor need for supplementation. Noted no significant wt change. Malnutrition Assessment:  Malnutrition Status:  Insufficient data    Context:  Chronic Illness       Estimated Daily Nutrient Needs:  Energy (kcal):  840-1575 (8-15 x  kg); Weight Used for Energy Requirements:        Protein (g):   (.8-1.0 x ABW (adj for ckd);  Weight Used for Protein Requirements:           Fluid (ml/day):  1 ml per kcal; Weight Used for Fluid Requirements:         Nutrition Related Findings:  Noted +1 generalized edema      Wounds:  None       Current Nutrition Therapies:    DIET CARB CONTROL; Carb Control: 4 carb choices (60 gms)/meal; No Added Salt (3-4 GM)    Anthropometric Measures:  · Height: 5' (152.4 cm)  · Current Body Weight: 237 lb (107.5 kg)   · Admission Body Weight: 230 lb (104.3 kg)    · Ideal Body Weight: 100 lbs; % Ideal Body Weight 237 %   · BMI: 46.3  · Adjusted Body Weight:  ; No Adjustment   · BMI Categories: Obese Class 3 (BMI 40.0 or greater)       Nutrition Diagnosis:   · Altered nutrition-related lab values related to endocrine dysfuntion as evidenced by lab values      Nutrition Interventions:   Food and/or Nutrient Delivery:  Continue Current Diet  Nutrition Education/Counseling:  No recommendation at this time   Coordination of Nutrition Care:  Continued Inpatient Monitoring    Goals:  consume >/= to 50 %       Nutrition Monitoring and Evaluation:

## 2020-09-23 NOTE — PLAN OF CARE
Problem: Falls - Risk of:  Goal: Will remain free from falls  Description: Will remain free from falls  Outcome: Met This Shift     Problem: Falls - Risk of:  Goal: Absence of physical injury  Description: Absence of physical injury  Outcome: Ongoing     Problem: Skin Integrity:  Goal: Absence of new skin breakdown  Description: Absence of new skin breakdown  Outcome: Ongoing  Note: Monitoring patient skin integrity for skin breakdown, turning and repositioning q2h per protocol. Barrier wipes for abdelrahman care Qshift. Dry flow pad in use on bed. Bilateral heel protector and sacral preventative boarder placed.         Problem: Self-Care:  Goal: Ability to participate in self-care as condition permits will improve  Description: Ability to participate in self-care as condition permits will improve  Outcome: Ongoing     Problem: Coping:  Goal: Ability to remain calm will improve  Description: Ability to remain calm will improve  Outcome: Not Met This Shift

## 2020-09-23 NOTE — PROGRESS NOTES
Perfect Serve sent to Thierry Killina NP to make aware of positive blood cultures. Awaiting response.      No new orders, will continue to monitor

## 2020-09-23 NOTE — PLAN OF CARE
Nutrition Problem #1: Altered nutrition-related lab values  Intervention: Food and/or Nutrient Delivery: Continue Current Diet  Nutritional Goals: consume >/= to 50 %

## 2020-09-23 NOTE — PROGRESS NOTES
Medication Reconciliation     List of medications patient is currently taking is complete. Source of information:   1. Conversation with patient's   2. EPIC records        Notes regarding home medications:  1. Patient received all of her morning and afternoon home medications today. 2. Levemir is 10-18 units nightly depending on BG, per     3. Recently started taking Norco 5-325 Q6H for back/hip pain.  Has only been taking 0.25 - 0.5 tabs once daily      Mariela Bustillos, Pharmacy Intern  9/22/2020 8:38 PM

## 2020-09-23 NOTE — ED NOTES
Spoke to patient's daughter and  and updated them on patient's condition and POC.      Kj Sanchez RN  09/22/20 2020

## 2020-09-23 NOTE — PLAN OF CARE
Patient free from falls this shift. Fall precautions in place at all times. Bed in lowest position with two side rails up and wheels locked. Call light within reach. Patient able and agreeable to contact for safety appropriately. Skin assessment performed each shift per protocol. Patient turned and repositioned every two hours and prn with pillow support. Patient checked for incontence every two hours. Safe environment was maintained for patient during this shift. Bed in lowest position with wheels locked. Call light in reach of patient and appropriate ID bands in place.

## 2020-09-23 NOTE — PROGRESS NOTES
0706-handoff completed including 4eyes,   0810-morning assessment completed by RN, VSS, tele SR, pt off bipap by RT, resp easy on 4l n/c,   0828-pts  called to check on pt, states pt has been confused for a while, has \"spells\" then clears up, appreciative of update, call transferred into room so pt could talk with , breakfast ordered for pt,   0930-rounded with critical care, new orders recd  1027-report called to Gurwinder Chandler, will transfer per stretcher,   1115-pt to room 5133 per w/c with o2 and tele, moved onto stretcher, . Electronically signed by Ruchi Mills RN on 9/23/2020 at 11:19 AM

## 2020-09-23 NOTE — PROGRESS NOTES
Hospitalist Progress Note      PCP: Mayda Souza MD    Date of Admission: 9/22/2020      Hospital Course: Admitted with lethargy 2/2 acute respiratory hypercapnic failure - improving on BiPAP. Subjective:    Patient seen and examined. Weaned off BiPAP this morning, on home O2. Per RN,  states patient has been waxing and waning with mental status for a while. She was recommend by pulmonology last visit for sleep study for likely NANCY but refused at that time. This morning patient is awake, alert to self, hospital setting, is otherwise confused. Medications:  Reviewed    Infusion Medications    dextrose       Scheduled Medications    sodium chloride flush  10 mL Intravenous 2 times per day    enoxaparin  40 mg Subcutaneous Daily    ipratropium-albuterol  1 ampule Inhalation Q4H WA    cefTRIAXone (ROCEPHIN) IV  1 g Intravenous Q24H    methylPREDNISolone  60 mg Intravenous Q6H    hydroCHLOROthiazide  25 mg Oral Daily    rosuvastatin  5 mg Oral Nightly    insulin lispro  0-6 Units Subcutaneous 6 times per day     PRN Meds: albuterol, sodium chloride flush, acetaminophen **OR** acetaminophen, ondansetron, glucose, dextrose, glucagon (rDNA), dextrose      Intake/Output Summary (Last 24 hours) at 9/23/2020 0754  Last data filed at 9/23/2020 0630  Gross per 24 hour   Intake 102.3 ml   Output 610 ml   Net -507.7 ml       Physical Exam Performed:    BP (!) 141/44   Pulse 82   Temp 98.8 °F (37.1 °C) (Axillary)   Resp 20   Wt 237 lb 3.4 oz (107.6 kg)   SpO2 100%   BMI 46.33 kg/m²       General appearance: No apparent distress, alert, confused, but cooperative. HEENT: Conjunctivae/corneas clear, neck supple w/ full ROM  Respiratory:  Normal respiratory effort. Diminished, CTAB  Cardiovascular: Regular rate and rhythm, normal S1/S2  Abdomen: Soft, non-tender, non-distended with normal bowel sounds.   Musculoskeletal: No edema bilaterally  Neurologic:  No new focal deficits  Psychiatric: Awake, confused, poor insight  Capillary Refill: Brisk,< 3 seconds   Peripheral Pulses: +2 palpable, equal bilaterally       Labs:   Recent Labs     09/22/20 1711 09/23/20  0456   WBC 8.2 6.7   HGB 11.6* 10.5*   HCT 36.3 32.9*    129*     Recent Labs     09/22/20  1711 09/23/20  0456    136   K 4.8 5.1   CL 90* 91*   CO2 44* 39*   BUN 18 15   CREATININE 0.8 0.7   CALCIUM 8.5 8.1*     Recent Labs     09/22/20  1711   AST 16   ALT 11   BILIDIR <0.2   BILITOT 0.8   ALKPHOS 82     No results for input(s): INR in the last 72 hours. Recent Labs     09/22/20 1711   TROPONINI <0.01       Urinalysis:      Lab Results   Component Value Date    NITRU Negative 09/22/2020    WBCUA 1 09/22/2020    BACTERIA 4+ 05/09/2020    RBCUA 6 09/22/2020    BLOODU TRACE 09/22/2020    SPECGRAV 1.028 09/22/2020    GLUCOSEU >=1000 09/22/2020       Radiology:  CT Head WO Contrast   Final Result   1. No acute intracranial hemorrhage, intra-axial mass, or acute territorial   infarct   2. Chronic sphenoid sinus disease         XR CHEST PORTABLE   Final Result   No acute cardiopulmonary disease is appreciated.                  Assessment/Plan:    Active Hospital Problems    Diagnosis    COPD exacerbation ([facility] [place] [J44.1]     Priority: High    Acute encephalopathy [G93.40]    Respiratory acidosis [E87.2]    Respiratory failure with hypercapnia (HCC) [J96.92]    Morbid obesity ([facility] [place] [facility] [E66.01]    DM (diabetes mellitus) ([facility] [place] [facility] [E11.9]    NANCY (obstructive sleep apnea) [G47.33]    Asthma exacerbation [J45.901]     COPD exacerbation  - on steroids, switch rocephin to doxycyline x 5 days  - duonebs    Acute on chronic hypoxic and hypercapneic respiratory failure, concern for NNACY  - improving  - pulmonology c/s - d/w Dr. Julieth Roche - patient would benefit from BiPAP at home qhs - refused last admission  - c/w BiPAP q HS, prn  - on home O2 3 L NC    Acute metabolic encephalopathy   - from hypercapnia  - improving, more alert but still confused    Hypomagnesia   - replacement, monitor     DM Type II with Hyperglycemia  - add low dose lantus, c/w ssi    HTN  - c/w home med    HLD - c/w statin    DVT Prophylaxis: lovenox  Diet: Diet NPO Effective Now  Code Status: Full Code      Dispo - ok to transfer out of ICU    Alejandra Mary MD

## 2020-09-23 NOTE — ED NOTES
ED SBAR report provider to [facility]. Patient to be transported to Room 2106 via stretcher by RN. Patient transported with bedside cardiac monitor. IV site clean, dry, and intact. MEWS score and pain assessed as 1 and documented. Updated patient and family on plan of care.        Kary Tompkins RN  09/22/20 3940

## 2020-09-23 NOTE — PROGRESS NOTES
Late entries:     2123: Report received from Carey Enriquez, ED RN via phone. All questions answered. Patient to be brought to ICU room 2106 shortly. 2203: Pt arrived to ICU Rm 2106 via stretcher on Bipap accompanied by ED Rn on monitor in stable condition. Moved into ICU bed. ICU monitors applied. 4 eye skin assessment completed with Scooby Zamora, see note. Admission assessment completed, see flow sheets. Patient on BiPAP alert and able to answer some orientation questions, but confused. Follows simple commands. Lung sounds diminished on ascultation. NSR on the monitor. Generalized edema and scattered bruising noted. Preventative mepilex boarders placed to sacrum and heels. Patient requests a blanket, but denies any other needs at this time. 2307: Patient agitated and confused trying to pull BiPAP off. Re-oriented at this time. Educated patient on need to be in hospital and need for oxygen requirements. Patient shakes head in understanding. 2357: Message sent to on call critical care, Dr. Zay Ocasio, regarding patient arrival to unit, reason for admission, and current clinical status. No new orders at this time. 0013: Insulin refused by patient, stating \"If you give that to me I will die. Do not give me insulin. Do not touch me. \" This RN explains to the patient nothing will be done without their consent. Patient not consolable at this time. Patient yelling and asking to be taken home. Wanting this RN to call her daughter, Katya Noble. This RN states that Michelle's number is not in the chart. Patient gives this RN the number of of Luciana Madrid,  listed in chart. 2675: Call to patients , Luciana Bartletta d/t patient screaming in bed stating they want to leave the hospital and that \"we are trying to kill them. \"  Luciana Madrid updated on patient current situation and clinical status. Updated on that patient has been confused.  This RN puts phone up to patients ear to have Luciana Madrid talk to patient while they are on the BiPAP. While Rip Sanches is talking to patient, the patients, \" I will stay put. \" All of Iggy's questions answered, he plans the visit in the morning. 0030: Reassessment completed, see flow sheets. No major changes at this time. 0228: Call from on call hospitalist, Dr. Demetrius Hemphill. Gave an update on the patient and how the have been since admission to the ICU. Orders to draw an ABG in the morning placed. 0235: Patient yelling in bed stating she needs to sit up, trying to pull the BiPAP off. Attempting to re-orient patient. Patient remains agitated and agressive toward staff, this RN, Charge RN and another ICU RN. Patient putting hands in staffs face and cursing. Unable to be re-directed. This RN stays at the bedside until patient calms and shakes head in understanding she is in the hospital.     6041: Call from lab regarding critical pCO2.   0420: Dr. Demetrius Hemphill on the unit and updated on critical, but improving pCO2 value of 74.4. No new orders at this time. 0430: Reassessment completed, see flow sheets. No major changes at this time. 0630: Patient repositioned in bed, presents more pleasant at this time, but remains confused. VSS on the BiPAP.     0700: Handoff/report completed with Gini day shift RN. Patient in bed on BiPAP, but confused. Trying to pull BiPAP as RNs enter the room. Patient re-directed. VSS.      Electronically signed by Bria Mayen RN on 9/23/2020 at 7:18 AM

## 2020-09-24 NOTE — PROGRESS NOTES
Physical Therapy  Malini Tilley  [phone]  [id number]    PT orders received and chart reviewed; pt on Bipap and nursing reported she would come in and take pt off bipap for eval however MD in and reported she did not want pt taken off bipap and pt is currently minimally responsive therefore will defer therapy eval and try again tomorrow  Mani Cantu, [address] [facility], [facility]

## 2020-09-24 NOTE — PLAN OF CARE
Problem: Falls - Risk of:  Goal: Will remain free from falls  Description: Will remain free from falls  9/24/2020 0121 by Nicolle Garcia RN  Outcome: Ongoing  9/23/2020 1830 by Ozzie Ryder RN  Outcome: Ongoing  Goal: Absence of physical injury  Description: Absence of physical injury  9/24/2020 0121 by Nicolle Garcia RN  Outcome: Ongoing  Note: No falls this shift. PT in bed with bed alarm on. Fall risk bracelet, non-skid socks, and fall contract in place. Bed in lowest position with all wheels locked. PT belongings and call light are within reach. Pt educated to call appropriately for needs and is rounded on frequently. Will continue to monitor. 9/23/2020 1830 by Ozzie Ryder RN  Outcome: Ongoing     Problem: Skin Integrity:  Goal: Will show no infection signs and symptoms  Description: Will show no infection signs and symptoms  9/24/2020 0121 by Nicolle Garcia RN  Outcome: Ongoing  9/23/2020 1830 by Ozzie Ryder RN  Outcome: Ongoing  Goal: Absence of new skin breakdown  Description: Absence of new skin breakdown  9/24/2020 0121 by Nicolle Garcia RN  Outcome: Ongoing  Note: Skin assessment completed every shift. Pt assessed for incontinence, appropriate barrier cream applied prn. Pt encouraged to turn/rotate every 2 hours. Assistance provided if pt unable to do so themselves.      9/23/2020 1830 by Ozzie Ryder RN  Outcome: Ongoing     Problem: Coping:  Goal: Ability to remain calm will improve  Description: Ability to remain calm will improve  9/24/2020 0121 by Nicolle Garcia RN  Outcome: Ongoing  9/23/2020 1830 by Ozzie Ryder RN  Outcome: Ongoing     Problem: Safety:  Goal: Ability to remain free from injury will improve  Description: Ability to remain free from injury will improve  9/24/2020 0121 by Nicolle Garcia RN  Outcome: Ongoing  9/23/2020 1830 by Ozzie Ryder RN  Outcome: Ongoing     Problem: Self-Care:  Goal: Ability to participate in self-care as condition permits will improve  Description: Ability to participate in self-care as condition permits will improve  9/24/2020 0121 by Nicolás Vo RN  Outcome: Ongoing  9/23/2020 1830 by Yesica Stoll RN  Outcome: Ongoing     Problem: Nutrition  Goal: Optimal nutrition therapy  9/24/2020 0121 by Nicolás Vo RN  Outcome: Ongoing  9/23/2020 1830 by Yesica Stoll RN  Outcome: Ongoing

## 2020-09-24 NOTE — PROGRESS NOTES
PT has patent PIV in left hand with brisk blood flow. Triple lumen femoral line removed, using sterile procedure, per protocol to decrease risk of infection. Sterile occlusive dressing placed to site and pressure held for 5 min. No complications or bleeding noted following removal. Catheter intact and disposed. Will continue to monitor.

## 2020-09-24 NOTE — PROGRESS NOTES
PT restless and agitated tonight, pulling at lines and BiPAP mask and hitting staff with call light and heart monitor. PT also keeps saying that she has to have a BM but cannot and needs a laxative. Perfect Serve sent to Alia Pizarro, NP requesting sleep aid and stool softener. NP in to assess PT at bedside, orders placed for ativan. Will continue to monitor.

## 2020-09-24 NOTE — PROGRESS NOTES
PT lethargic this AM but opens eyes to voice and sternal rub. PT answers questions appropriately and was able to tell me the year. Vital signs stable. BiPAP re-applied. Will continue to monitor.

## 2020-09-24 NOTE — PROGRESS NOTES
Occupational Therapy  Pt on BiPap and confused, difficult to arouse. MD in and ordering stat tests and requested BiPap stay on. Will defer OT eval until tomorrow if mentation and med status improves.  Maggie Ruby, OTR/L #[id number] 9/24/2020 9:10 AM

## 2020-09-24 NOTE — PROGRESS NOTES
Hospitalist Progress Note      PCP: Catherine Simons MD    Date of Admission: 9/22/2020      Hospital Course: Admitted with lethargy 2/2 acute respiratory hypercapnic failure - improving on BiPAP. More lethargic today. Subjective:    Patient seen and examined. Lethargic today, back on bipap. Moaning, opened eyes after much coaxing. Moving extremities. Medications:  Reviewed    Infusion Medications    dextrose       Scheduled Medications    cefTRIAXone (ROCEPHIN) IV  1 g Intravenous Q24H    insulin lispro  0-18 Units Subcutaneous TID WC    insulin lispro  0-9 Units Subcutaneous Nightly    insulin glargine  5 Units Subcutaneous Daily    sodium chloride flush  10 mL Intravenous 2 times per day    enoxaparin  40 mg Subcutaneous Daily    ipratropium-albuterol  1 ampule Inhalation Q4H WA    methylPREDNISolone  60 mg Intravenous Q6H    hydroCHLOROthiazide  25 mg Oral Daily    rosuvastatin  5 mg Oral Nightly     PRN Meds: glucose, dextrose, glucagon (rDNA), dextrose, albuterol, sodium chloride flush, acetaminophen **OR** acetaminophen, ondansetron      Intake/Output Summary (Last 24 hours) at 9/24/2020 0920  Last data filed at 9/24/2020 0915  Gross per 24 hour   Intake 480 ml   Output 875 ml   Net -395 ml       Physical Exam Performed:    BP (!) 145/77   Pulse 85   Temp 98.2 °F (36.8 °C) (Axillary)   Resp 24   Ht 5' (1.524 m)   Wt 234 lb 5.6 oz (106.3 kg)   SpO2 100%   BMI 45.77 kg/m²       General appearance: No apparent distress, lethargic  HEENT: Conjunctivae/corneas clear, neck supple w/ full ROM  Respiratory:  Normal respiratory effort. Diminished  Cardiovascular: Regular rate and rhythm, normal S1/S2  Abdomen: Soft, non-tender, non-distended with normal bowel sounds.   Musculoskeletal: No edema bilaterally  Neurologic:  Lethargic, No new focal deficits  Psychiatric: n/a  Capillary Refill: Brisk,< 3 seconds   Peripheral Pulses: +2 palpable, equal bilaterally       Labs: Recent Labs     09/22/20  1711 09/23/20  0456 09/24/20  0537   WBC 8.2 6.7 4.6   HGB 11.6* 10.5* 10.3*   HCT 36.3 32.9* 31.3*    129* 128*     Recent Labs     09/22/20  1711 09/23/20  0456 09/24/20  0537    136 136   K 4.8 5.1 4.2   CL 90* 91* 89*   CO2 44* 39* 41*   BUN 18 15 24*   CREATININE 0.8 0.7 0.8   CALCIUM 8.5 8.1* 8.1*   PHOS  --  2.4* 2.6     Recent Labs     09/22/20  1711   AST 16   ALT 11   BILIDIR <0.2   BILITOT 0.8   ALKPHOS 82     No results for input(s): INR in the last 72 hours. Recent Labs     09/22/20  1711   TROPONINI <0.01       Urinalysis:      Lab Results   Component Value Date    NITRU Negative 09/22/2020    WBCUA 1 09/22/2020    BACTERIA 4+ 05/09/2020    RBCUA 6 09/22/2020    BLOODU TRACE 09/22/2020    SPECGRAV 1.028 09/22/2020    GLUCOSEU >=1000 09/22/2020       Radiology:  CT Head WO Contrast   Final Result   1. No acute intracranial hemorrhage, intra-axial mass, or acute territorial   infarct   2. Chronic sphenoid sinus disease         XR CHEST PORTABLE   Final Result   No acute cardiopulmonary disease is appreciated. Assessment/Plan:    Active Hospital Problems    Diagnosis    COPD exacerbation ([facility] [J44.1]     Priority: High    CO2 narcosis [R06.89]    Acute encephalopathy [G93.40]    Respiratory acidosis [E87.2]    Respiratory failure with hypercapnia (HCC) [J96.92]    Morbid obesity ([facility] [E66.01]    DM (diabetes mellitus) ([facility] [E11.9]    NANCY (obstructive sleep apnea) [G47.33]    Asthma exacerbation [J45.901]     COPD exacerbation  - on steroids, resume rocephin, unable to take PO at this time  - duonebs    Positive blood culture  - GPC in clusters resembling staph, GPR resembling Diptheroids  - possible contaminate, culture in processes - monitor final results, on rocephin  - repeat bl cx ordered    Acute on chronic hypoxic and hypercapneic respiratory failure, concern for NANCY (wears 3 L NC at home).    - more lethargic today  - pulmonology c/s - back on BIPAP this morning  - ABG ordered    Acute metabolic encephalopathy   - from hypercapnia  - lethargic today, did receive ativan at 2 AM this morning for agitation  - avoid benzos  - ABG ordered    Hypomagnesia   - repeat wnl     DM Type II with Hyperglycemia - on steroids  - increase lantus, c/w ssi    HTN  - c/w home med    HLD - c/w statin    DVT Prophylaxis: lovenox  Diet: DIET CARB CONTROL; Carb Control: 4 carb choices (60 gms)/meal; No Added Salt (3-4 GM);  Dental Soft  Code Status: Full Code      Dispo - inpatient    Gerson Kang MD

## 2020-09-24 NOTE — PROGRESS NOTES
WBC 8.2 6.7 4.6   HGB 11.6* 10.5* 10.3*   HCT 36.3 32.9* 31.3*   MCV 91.4 89.8 90.3    129* 128*     BMP:   Recent Labs     20  1711 20  0456 20  0537    136 136   K 4.8 5.1 4.2   CL 90* 91* 89*   CO2 44* 39* 41*   PHOS  --  2.4* 2.6   BUN 18 15 24*   CREATININE 0.8 0.7 0.8     LIVER PROFILE:   Recent Labs     20  1711   AST 16   ALT 11   BILIDIR <0.2   BILITOT 0.8   ALKPHOS 82     PT/INR: No results for input(s): PROTIME, INR in the last 72 hours. APTT: No results for input(s): APTT in the last 72 hours. UA:  Recent Labs     20  1726   COLORU YELLOW   PHUR 5.0  5.5   WBCUA 1   RBCUA 6*   CLARITYU Clear   SPECGRAV 1.028   LEUKOCYTESUR Negative   UROBILINOGEN 0.2   BILIRUBINUR Negative   BLOODU TRACE*   GLUCOSEU >=1000*     No results for input(s): PH, PCO2, PO2 in the last 72 hours. Films:  Chest imaging reports were reviewed and imaging was reviewed by me and showed no new films     [id number]    Cultures:  Blood:  Staph, Diphtheroids     I reviewed the labs and images listed above    Assessment:   · Acute on Chronic Hypoxic/Hypercapnic Respiratory failure  · Acute Metabolic Encephalopathy   · Obstructive Lung Disease with FEV1 47% (severe at baseline)  · NANCY/OHS      Plan:  · Continuous BPAP   · ABG pending  · Avoid centraling acting agents as it will precipitate narcosis  · Will ask DME to order home NIV machine. She has no choice in the matter. 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 PCO2 is 74 at baseline. Due to severity of disease, Bipap / Bipap ST have been ruled out. her severe condition will derive the most benefit from NIV. Without NIV, Salina Cook will be at risk for complications due to hypercapnia which could lead to hospitalizations or death.

## 2020-09-25 NOTE — PROGRESS NOTES
Pt calm and cooperative overnight, A+Ox4. Allowed RT to place her on BIPAP. Removed pt from BIPAP per request and placed nasal cannula back on at 4 L/min. O2 sat currently at 99%. Will continue to monitor.      Electronically signed by Alfonso Sherwood RN on 9/25/2020 at 5:53 AM

## 2020-09-25 NOTE — PLAN OF CARE
Problem: Falls - Risk of:  Goal: Will remain free from falls  Description: Will remain free from falls  9/25/2020 1124 by Alan Irving RN  Outcome: Ongoing  9/25/2020 0409 by Suzanna Martines RN  Outcome: Ongoing  Goal: Absence of physical injury  Description: Absence of physical injury  9/25/2020 1124 by Alan Irvign RN  Outcome: Ongoing  9/25/2020 0409 by Suzanna Martines RN  Outcome: Ongoing     Problem: Skin Integrity:  Goal: Will show no infection signs and symptoms  Description: Will show no infection signs and symptoms  9/25/2020 1124 by Alan Irving RN  Outcome: Ongoing  9/25/2020 0409 by Suzanna Martines RN  Outcome: Ongoing  Goal: Absence of new skin breakdown  Description: Absence of new skin breakdown  9/25/2020 1124 by Alan Irving RN  Outcome: Ongoing  9/25/2020 0409 by Suzanna Martines RN  Outcome: Ongoing     Problem: Coping:  Goal: Ability to remain calm will improve  Description: Ability to remain calm will improve  9/25/2020 1124 by Alan Irving RN  Outcome: Ongoing  9/25/2020 0409 by Suzanna Martines RN  Outcome: Ongoing     Problem: Safety:  Goal: Ability to remain free from injury will improve  Description: Ability to remain free from injury will improve  9/25/2020 1124 by Alan Irving RN  Outcome: Ongoing  9/25/2020 0409 by Suzanna Martines RN  Outcome: Ongoing     Problem: Self-Care:  Goal: Ability to participate in self-care as condition permits will improve  Description: Ability to participate in self-care as condition permits will improve  9/25/2020 1124 by Alan Irving RN  Outcome: Ongoing  9/25/2020 0409 by Suzanna Martines RN  Outcome: Ongoing     Problem: Nutrition  Goal: Optimal nutrition therapy  9/25/2020 1124 by Alan Irving RN  Outcome: Ongoing  9/25/2020 0409 by Suzanna Martines RN  Outcome: Ongoing

## 2020-09-25 NOTE — PROGRESS NOTES
Hospitalist Progress Note  9/25/2020 10:05 AM    PCP: Bird Pfeiffer MD    [id number]     Date of Admission: 9/22/2020                                                                                                                     HOSPITAL COURSE    Patient demographics:  The patient  Sedrick Xie is a 80 y.o. female     Significant past medical history:   Patient Active Problem List   Diagnosis    Abscess of leg, right    COPD exacerbation ([facility]    COPD (chronic obstructive pulmonary disease) ([facility]    Kidney mass    Hepatobiliary duct injury    Asthma exacerbation    DM [facility]di[facility] mellitus[facility]    Choledocholithiasis    NANCY (obstructive sleep apnea)    Morbid obesity ([facility]    Cholecystitis    Epigastric pain    Acute respiratory failure with hypoxia and hypercapnia (HCC)    Acute metabolic encephalopathy    Respiratory failure with hypercapnia (HCC)    Environmental allergies    Hyperglycemia    Acute cystitis without hematuria    Acute encephalopathy    Respiratory acidosis    CO2 narcosis         Presenting symptoms:  Acute altered mental status    Diagnostic workup:      CONSULTS DURING ADMISSION :   IP CONSULT TO CASE MANAGEMENT      Patient was diagnosed with:        Treatment while inpatient:                                                                                         ----------------------------------------------------------      SUBJECTIVE COMPLAINTS-  Follow up for Acute altered mental status    Diet: DIET CARB CONTROL; Carb Control: 4 carb choices (60 gms)/meal; No Added Salt (3-4 GM);  Dental Soft      OBJECTIVE:   Patient Active Problem List   Diagnosis    Abscess of leg, right    COPD exacerbation ([facility]    COPD (chronic obstructive pulmonary disease) ([facility]    Kidney mass    Hepatobiliary duct injury    Asthma exacerbation    DM [facility]diabetes mellitus) [facility]    Choledocholithiasis    NANCY (obstructive sleep apnea)    Morbid obesity ([facility]    Cholecystitis    Epigastric pain    Acute respiratory failure with hypoxia and hypercapnia (HCC)    Acute metabolic encephalopathy    Respiratory failure with hypercapnia (HCC)    Environmental allergies    Hyperglycemia    Acute cystitis without hematuria    Acute encephalopathy    Respiratory acidosis    CO2 narcosis       Allergies  Ace inhibitors; Atorvastatin; Ciprofloxacin; Ciprofloxacin in d5w; Codeine; Fenofibrate; Glipizide; Macrobid [nitrofurantoin monohyd macro]; Nitrofurantoin; Pioglitazone; Pravastatin; Simvastatin; Xalatan [latanoprost];  Azithromycin; Budesonide-formoterol fumarate; Misoprostol; Pcn [penicillins]; and Sulfa antibiotics    Medications    Scheduled Meds:   insulin glargine  10 Units Subcutaneous Daily    methylPREDNISolone  40 mg Intravenous Q12H    cefTRIAXone (ROCEPHIN) IV  1 g Intravenous Q12H    insulin lispro  0-18 Units Subcutaneous TID WC    insulin lispro  0-9 Units Subcutaneous Nightly    sodium chloride flush  10 mL Intravenous 2 times per day    enoxaparin  40 mg Subcutaneous Daily    ipratropium-albuterol  1 ampule Inhalation Q4H WA    hydroCHLOROthiazide  25 mg Oral Daily    rosuvastatin  5 mg Oral Nightly     Continuous Infusions:   dextrose       PRN Meds:  glucose, dextrose, glucagon (rDNA), dextrose, albuterol, sodium chloride flush, acetaminophen **OR** acetaminophen, ondansetron    Vitals   Vitals /wt   Patient Vitals for the past 8 hrs:   BP Temp Temp src Pulse Resp SpO2 Weight   09/25/20 0846 -- -- -- -- -- 99 % --   09/25/20 0751 (!) 168/80 98.7 °F (37.1 °C) Oral 106 16 100 % --   09/25/20 0545 -- -- -- -- -- 99 % --   09/25/20 0445 -- -- -- -- -- -- 234 lb 5.6 oz (106.3 kg)   09/25/20 0429 -- -- -- -- 22 -- --   09/25/20 0416 (!) 156/65 98.7 °F (37.1 °C) Oral 93 22 98 % --        72HR INTAKE/OUTPUT:      Intake/Output Summary (Last 24 hours) at 9/25/2020 1005  Last data filed at 9/25/2020 0545  Gross per 24 hour   Intake 540 ml   Output 1325 ml Net -785 ml       Exam:    Gen:   Alert and oriented ×3  Eyes: PERRL. No sclera icterus. No conjunctival injection. ENT: No discharge. Pharynx clear. External appearance of ears and nose normal.  Neck: Trachea midline. No obvious mass. Resp: Decreased breath sounds bilaterally  CV: Regular rate. Regular rhythm. No murmur or rub. No edema. GI: Non-tender. Non-distended. No hernia. Skin: Warm, dry, normal texture and turgor. Lymph: No cervical LAD. No supraclavicular LAD. M/S: / Ext. No cyanosis. No clubbing. No joint deformity. Neuro: CN 2-12 are intact,  no neurologic deficits noted. PT/INR: No results for input(s): PROTIME, INR in the last 72 hours. APTT: No results for input(s): APTT in the last 72 hours. CBC:   Recent Labs     09/22/20 1711 09/23/20 0456 09/24/20  0537 09/25/20  0556   WBC 8.2 6.7 4.6 5.4   HGB 11.6* 10.5* 10.3* 10.8*   HCT 36.3 32.9* 31.3* 33.2*   MCV 91.4 89.8 90.3 88.6    129* 128* 134*       BMP:   Recent Labs     09/22/20 1711 09/23/20 0456 09/24/20  0537 09/25/20  0556    136 136 138   K 4.8 5.1 4.2 4.4   CL 90* 91* 89* 90*   CO2 44* 39* 41* 41*   PHOS  --  2.4* 2.6 2.2*   BUN 18 15 24* 23*   CREATININE 0.8 0.7 0.8 0.7       LIVER PROFILE:   Recent Labs     09/22/20 1711   ALKPHOS 82   AST 16   ALT 11   BILIDIR <0.2   BILITOT 0.8     No results for input(s): AMYLASE in the last 72 hours. No results for input(s): LIPASE in the last 72 hours. UA:  Recent Labs     09/22/20 1726   WBCUA 1   RBCUA 6*       TROPONIN:   Recent Labs     09/22/20 1711   TROPONINI <0.01       Lab Results   Component Value Date/Time    URRFLXCULT Not Indicated 09/22/2020 05:26 PM       No results for input(s): TSHREFLEX in the last 72 hours.     No components found for: [id number]  POC GLUCOSE:    Recent Labs     09/24/20  1628 09/24/20 2000 09/25/20  0104 09/25/20  0408 09/25/20  0719   POCGLU 233* 328* 324* 313* 259*     Recent Labs     09/23/20  0456   LABA1C 6.9

## 2020-09-25 NOTE — PLAN OF CARE
Problem: Falls - Risk of:  Goal: Will remain free from falls  Description: Will remain free from falls  Outcome: Ongoing  Goal: Absence of physical injury  Description: Absence of physical injury  Outcome: Ongoing     Problem: Skin Integrity:  Goal: Will show no infection signs and symptoms  Description: Will show no infection signs and symptoms  Outcome: Ongoing  Goal: Absence of new skin breakdown  Description: Absence of new skin breakdown  Outcome: Ongoing     Problem: Coping:  Goal: Ability to remain calm will improve  Description: Ability to remain calm will improve  Outcome: Ongoing     Problem: Safety:  Goal: Ability to remain free from injury will improve  Description: Ability to remain free from injury will improve  Outcome: Ongoing     Problem: Self-Care:  Goal: Ability to participate in self-care as condition permits will improve  Description: Ability to participate in self-care as condition permits will improve  Outcome: Ongoing     Problem: Nutrition  Goal: Optimal nutrition therapy  Outcome: Ongoing

## 2020-09-25 NOTE — PROGRESS NOTES
Physical Therapy    Facility/Department: [address] PROGRESSIVE CARE  Initial Assessment    NAME: Mariposa Teran  : 1935  MRN: [id number]    Date of Service: 2020    Discharge Recommendations:  24 hour supervision or assist, 2-3 sessions per week   PT Equipment Recommendations  Equipment Needed: No  Mariposa Teran scored a 15/24 on the AM-PAC short mobility form. Current research shows that an AM-PAC score of 18 or greater is typically associated with a discharge to the patient's home setting. Despite  the patient's AM-PAC score and their current functional mobility deficits, Pt reports she has adequate assist and equipment at home therefore it is recommended that the patient have 2-3 sessions per week of Physical Therapy at d/c to increase the patient's independence. At this time, this patient demonstrates the endurance and safety to discharge home with home PT and a follow up treatment frequency of 2-3x/wk. Please see assessment section for further patient specific details. HOME HEALTH CARE: LEVEL 1 STANDARD     -Initial home health evaluation to occur within 24-48 hours, in patient home    -Home health agency to establish plan of care for patient over 60 day period    -Medication Reconciliation    -PCP Visit scheduled within seven days of discharge    -PT/OT to evaluate with goal of regaining prior level of functioning    -OT to evaluate if patient has [address] needs for personal care     If patient discharges prior to next session this note will serve as a discharge summary. Please see below for the latest assessment towards goals. Assessment   Body structures, Functions, Activity limitations: Decreased functional mobility   Assessment: pt is an 79 yo female who was adm to hosp with with lethargy 2/2 acute respiratory hypercapnic failure.  Pt currently alert and oriented and able to get up from bed with assist of 2 and able to transfer and walk a few steps with RW and CGA of 2; pt reports her daughter can stay with her at discharge to assist.  Recommend home with 24/7 assist and home PT  Prognosis: Good  Decision Making: Medium Complexity  PT Education: Goals;PT Role;General Safety  Barriers to Learning: none  REQUIRES PT FOLLOW UP: Yes  Activity Tolerance  Activity Tolerance: Patient limited by endurance  Activity Tolerance: limited by breathing status       Patient Diagnosis(es): The primary encounter diagnosis was Acute on chronic respiratory failure with hypercapnia ([address] Diagnoses of Encephalopathy acute and CO2 narcosis were also pertinent to this visit. has a past medical history of Arthritis, Asthma, Cancer ([address] Chronic kidney disease, COPD (chronic obstructive pulmonary disease) ([address] Glaucoma, Heart burn, Hyperlipidemia, Hypertension, MRSA (methicillin resistant Staphylococcus aureus) colonization, Other disorders of kidney and ureter in diseases classified elsewhere, and Type II or unspecified type diabetes mellitus without mention of complication, not stated as uncontrolled. has a past surgical history that includes back surgery; Tubal ligation; Appendectomy; other surgical history; Kidney cyst removal; Bunionectomy; Eye surgery; and Cholecystectomy, laparoscopic (01/18/2018).     Restrictions  Restrictions/Precautions  Restrictions/Precautions: Fall Risk  Position Activity Restriction  Other position/activity restrictions: 4L O2 (pt on 2 liters at home)  Vision/Hearing  Vision: Impaired  Vision Exceptions: Wears glasses at all times  Hearing: Within functional limits     Subjective  General  Chart Reviewed: Yes  Patient assessed for rehabilitation services?: Yes  Additional Pertinent Hx: pt is an 79 yo female who was [facility] with lethargy 2/2 acute respiratory hypercapnic failure  Response To Previous Treatment: Not applicable  Family / Caregiver Present: No  Follows Commands: Within Functional Limits  Subjective  Subjective: pt very pleasant today; agreeable to getting up with therapy  Pain Screening  Patient Currently in Pain: No          Orientation  Orientation  Overall Orientation Status: Within Functional Limits  Social/Functional History  Social/Functional History  Lives With: Spouse  Type of Home: House  Home Layout: One level, Multi-level(quad level; chair lift between floors)  Home Access: Stairs to enter without rails(pt reports family lifts her up in w/c for getting in and out of home)  Entrance Stairs - Number of Steps: 1  Bathroom Shower/Tub: Walk-in shower(small lip)  Bathroom Equipment: Hand-held shower, Toilet raiser, Shower chair, Grab bars in shower, 3-in-1 commode  Home Equipment: [employer], 4 wheeled walker, Wheelchair-manual, Oxygen(2L O2)  Receives Help From: Home health, Family(MWF 1 hour each day)  ADL Assistance: Needs assistance(assist from home health aide and  with dressing, toileting, and bathing)  Homemaking Assistance: Needs assistance  Ambulation Assistance: Independent(4WW;  close by; pt uses 4WW on upper level of home (bed and bath) and uses w/c for kitchen and living room) no bathroom on main level of home)  Transfer Assistance: Independent(4WW)  Active : No  Patient's  Info: grandson drives;  drives but has not been anywhere since Covid  Occupation: Retired  Cognition   Cognition  Overall Cognitive Status: WFL    Objective          AROM RLE (degrees)  RLE AROM: WFL  AROM LLE (degrees)  LLE AROM : WFL  Strength RLE  Comment: functionally poor  Strength LLE  Comment: functionally poor, has pain with walking in LLE for last month     Sensation  Overall Sensation Status: Impaired(reports neuropathy in bilateral feet)  Bed mobility  Supine to Sit: 2 Person assistance; Moderate assistance  Sit to Supine: Unable to assess(up in chair after session)  Scooting: Stand by assistance  Transfers  Sit to Stand: Contact guard assistance;2 Person Assistance(with walker in front)  Stand to sit: Contact guard assistance;2 Person Assistance  Ambulation  Ambulation?: Yes  Ambulation 1  Surface: level tile  Device: Rolling Walker  Other Apparatus: O2(4 liters)  Assistance: Contact guard assistance;2 Person assistance  Quality of Gait: slow small steps; some fatigue noted and wobbly at end when turing to sit in chair  Distance: 3' to [facility] chair  Comments: assisted with cleansing pt with bath wipes as she reported \"I stink\" assisted with positioning in chair for comfort and breakfast tray set up  Stairs/Curb  Stairs?: No     Balance  Sitting - Static: Good  Sitting - Dynamic: Fair  Standing - Static: Fair(with RW)  Standing - Dynamic: Fair;-(with RW)        Plan   Plan  Times per week: 3-5  Current Treatment Recommendations: Functional Mobility Training  Safety Devices  Type of devices: Call light within reach, Left in chair, Nurse notified    G-Code       OutComes Score                                                  AM-PAC Score  AM-PAC Inpatient Mobility Raw Score : 15 (09/25/20 0915)  AM-PAC Inpatient T-Scale Score : 39.45 (09/25/20 0915)  Mobility Inpatient CMS 0-100% Score: 57.7 (09/25/20 0915)  Mobility Inpatient CMS G-Code Modifier : CK (09/25/20 0915)          Goals  Short term goals  Time Frame for Short term goals: by discharge  Short term goal 1: bed mob min A  Short term goal 2: transfers SBA  Short term goal 3: amb 22' with RW CGA  Patient Goals   Patient goals : to go home       Therapy Time   Individual Concurrent Group Co-treatment   Time In 0826         Time Out 0915         Minutes 49                 CHEIKH ADLER, PT

## 2020-09-25 NOTE — PROGRESS NOTES
Occupational Therapy   Occupational Therapy Initial Assessment and Tentative D/C  Date: 2020   Patient Name: Shonda Mckeon  MRN: [id number]     : 1935    Date of Service: 2020    Discharge Recommendations: Shonda Mckeon scored a 15/24 on the AM-PAC ADL Inpatient form. Current research shows that an AM-PAC score of 18 or greater is typically associated with a discharge to the patient's home setting. Based on the patient's AM-PAC score, and their current ADL deficits, it is recommended that the patient have 2-3 sessions per week of Occupational Therapy at d/c to increase the patient's independence. At this time, this patient demonstrates the endurance and safety to discharge home with [facility] and a follow up treatment frequency of 2-3x/wk. Please see assessment section for further patient specific details. Pt needing increased assist for ADLs at baseline with pt getting HHOT 3x a week and assist from /family as needed. Pt reports good support from children who can provide 24hr assist.     If patient discharges prior to next session this note will serve as a discharge summary. Please see below for the latest assessment towards goals. Continue to assess pending progress, 2-3 sessions per week, 24 hour supervision or assist  OT Equipment Recommendations  Equipment Needed: No  Other: pt with needed AD    Assessment   Performance deficits / Impairments: Decreased functional mobility ; Decreased strength;Decreased endurance;Decreased ADL status; Decreased high-level IADLs;Decreased balance  Assessment: Shonda Mckeon is a 80 y.o. female who presents to the emergency department with AMS. Has pertinent h/o hypercapnic respiratory failure, takes Vicodin. Found down by , minimally responsive per EMS with slight improving mentation en route after Narcan administered. No reports of fevers, pain anywhere, pt's mental status limits rest of history taking.  PTA pt from home with family where she needed assist for mobility and ADLs with use of 4WW. Pt currently functioning below baseline completing bed mobility with Mod Ax2. Pt completes functional mobility and transfers with CGAx2 and RW. Anticipate pt needing up to Max A for ADLs. Pt currently limited due to decreased overall strength and endurance. Pt reports good support from family upon D/C. Pt reports having adequate AD for mobility/ADLs. Pt will benefit from skilled OT services at this time. Anticipate pt safe to return home with 24hr assist, [address], and progression in therapy. Prognosis: Good  Decision Making: Medium Complexity  Exam: see above  Assistance / Modification: RW  OT Education: Plan of Care;OT Role;Transfer Training;ADL Adaptive Strategies  REQUIRES OT FOLLOW UP: Yes  Activity Tolerance  Activity Tolerance: Patient Tolerated treatment well  Safety Devices  Safety Devices in place: Yes  Type of devices: Chair alarm in place;Call light within reach; Left in chair;Gait belt;Nurse notified           Patient Diagnosis(es): The primary encounter diagnosis was Acute on chronic respiratory failure with hypercapnia ([facility] [address] Diagnoses of Encephalopathy acute and CO2 narcosis were also pertinent to this visit. has a past medical history of Arthritis, Asthma, Cancer ([address] Chronic kidney disease, COPD (chronic obstructive pulmonary disease) ([address] Glaucoma, Heart burn, Hyperlipidemia, Hypertension, MRSA (methicillin resistant Staphylococcus aureus) colonization, Other disorders of kidney and ureter in diseases classified elsewhere, and Type II or unspecified type diabetes mellitus without mention of complication, not stated as uncontrolled. has a past surgical history that includes back surgery; Tubal ligation; Appendectomy; other surgical history; Kidney cyst removal; Bunionectomy; Eye surgery; and Cholecystectomy, laparoscopic (01/18/2018).            Restrictions  Restrictions/Precautions  Restrictions/Precautions: Fall Risk  Position Activity Restriction  Other position/activity restrictions: 4L O2 (pt on 2 liters at home)    Subjective   General  Chart Reviewed: Yes  Patient assessed for rehabilitation services?: Yes  Additional Pertinent Hx: per ED note, April Long is a 80 y.o. female who presents to the emergency department with AMS. Has pertinent h/o hypercapnic respiratory failure, takes Vicodin. Found down by , minimally responsive per EMS with slight improving mentation en route after Narcan administered. No reports of fevers, pain anywhere, pt's mental status limits rest of history taking. Family / Caregiver Present: No  Referring Practitioner: Jimi Anthony MD  Subjective  Subjective: Pt supine in bed upon arrival and agreeable to OT evaluation. Pt reports no pain. Pt on 3L O2 throughout session. General Comment  Comments: okay for therapy per RN.   Vital Signs  Temp: 98.7 °F (37.1 °C)  Temp Source: Oral  Pulse: 106  Heart Rate Source: Monitor  Resp: 16  BP: (!) 168/80  BP Location: Right Arm  BP Upper/Lower: Lower  MAP (mmHg): 109  Oxygen Therapy  SpO2: 99 %  Pulse Oximeter Device Mode: Continuous  O2 Device: Nasal cannula  O2 Flow Rate (L/min): 4 L/min  Patient Observation  Observations: HHN not given -- pt with PT/OT  Social/Functional History  Social/Functional History  Lives With: Spouse  Type of Home: House  Home Layout: One level, Multi-level(quad level; chair lift between floors)  Home Access: Stairs to enter without rails(pt reports family lifts her up in w/c for getting in and out of home)  Entrance Stairs - Number of Steps: 1  Bathroom Shower/Tub: Walk-in shower(small lip)  Bathroom Equipment: Hand-held shower, Toilet raiser, Shower chair, Grab bars in shower, 3-in-1 commode  Home Equipment: [employer], 4 wheeled walker, Wheelchair-manual, Oxygen(2L O2)  Receives Help From: Home health, Family(MWF 1 hour each day)  ADL Assistance: Needs assistance(assist from home health aide and  with dressing, toileting, and bathing)  Homemaking Assistance: Needs assistance  Ambulation Assistance: Independent(4WW;  close by; pt uses 4WW on upper level of home (bed and bath) and uses w/c for kitchen and living room) no bathroom on main level of home)  Transfer Assistance: Independent(4WW)  Active : No  Patient's  Info: grandson drives;  drives but has not been anywhere since Covid  Occupation: Retired       Objective   Vision: Impaired  Vision Exceptions: Wears glasses at all times  Hearing: Within functional limits    Orientation  Overall Orientation Status: Within Functional Limits     Balance  Sitting Balance: Stand by assistance(seated EOB)  Standing Balance: Contact guard assistance(RW)  Functional Mobility  Functional - Mobility Device: Rolling Walker  Activity: Other(short distances bed to chair)  Assist Level: Contact guard assistance(CGA x2)  Functional Mobility Comments: no LOB  Wheelchair Bed Transfers  Wheelchair/Bed - Technique: Ambulating(RW)  Equipment Used: Bed;Other(bed to chair)  Level of Asssistance: Contact guard assistance;2 Person assistance  ADL  Feeding: Independent  UE Bathing: Maximum assistance  LE Bathing: Maximum assistance(in stance; CGA for balance)  LE Dressing: Maximum assistance(assist for donning bilateral socks)  Toileting: Dependent/Total(veliz)  Additional Comments: Anticipate pt needing up to Max A for ADLs including dressing, bathing, and toileting based on ROM, strength, and balance. Pt needing assist for ADLs at baseline. Tone RUE  RUE Tone: Normotonic  Tone LUE  LUE Tone: Normotonic  Coordination  Movements Are Fluid And Coordinated: Yes     Bed mobility  Supine to Sit: 2 Person assistance; Moderate assistance  Sit to Supine: Unable to assess(up in chair after session)  Scooting: Stand by assistance  Transfers  Sit to stand: 2 Person assistance;Contact guard assistance  Stand to sit: Contact guard assistance;2 Person assistance  Transfer Comments: to/from RW; pt pulling up on RW     Cognition  Overall Cognitive Status: WFL        Sensation  Overall Sensation Status: Impaired(reports neuropathy in bilateral feet)        LUE AROM (degrees)  LUE AROM : Exceptions  LUE General AROM: decreased shoulder AROM  RUE AROM (degrees)  RUE AROM : Exceptions  RUE General AROM: decreased shoulder AROM  LUE Strength  Gross LUE Strength: WFL  RUE Strength  Gross RUE Strength: WFL                   Plan   Plan  Times per week: 3-5x  Current Treatment Recommendations: Strengthening, Functional Mobility Training, Gait Training, Endurance Training, Balance Training, Safety Education & Training, Self-Care / ADL, Patient/Caregiver Education & Training    AM-PAC Score        AM-PAC Inpatient Daily Activity Raw Score: 15 (09/25/20 0910)  AM-PAC Inpatient ADL T-Scale Score : 34.69 (09/25/20 0910)  ADL Inpatient CMS 0-100% Score: 56.46 (09/25/20 0910)  ADL Inpatient CMS G-Code Modifier : CK (09/25/20 0910)    Goals  Short term goals  Time Frame for Short term goals: prior to D/C  Short term goal 1: complete functional mobility and transfers with SBA  Short term goal 2: complete bathing and dressing with Mod A  Short term goal 3: complete toielting with Min A  Short term goal 4: complete bed mobility with Min A  Short term goal 5: complete grooming with setup  Long term goals  Time Frame for Long term goals : STG=LTG  Patient Goals   Patient goals : return home       Therapy Time   Individual Concurrent Group Co-treatment   Time In 0824         Time Out 0908         Minutes 44         Timed Code Treatment Minutes: 29 Minutes(15 minute eval)       RAYSHAWN Enamorado/GRAEME

## 2020-09-25 NOTE — PROGRESS NOTES
Pulmonary Progress Note    CC:  Follow up hypercapnic respiratory failure    Subjective:  bpap last night  4 liters currently   Obtunded yesterday   Awake and in chair  Says they lost her dentures and rosary and is upset     ROS  Breathing improved       Intake/Output Summary (Last 24 hours) at 9/25/2020 0798  Last data filed at 9/25/2020 0545  Gross per 24 hour   Intake 540 ml   Output 1600 ml   Net -1060 ml         PHYSICAL EXAM:  Blood pressure (!) 156/65, pulse 93, temperature 98.7 °F (37.1 °C), temperature source Oral, resp. rate 22, height 5' (1.524 m), weight 234 lb 5.6 oz (106.3 kg), SpO2 99 %, not currently breastfeeding.'  Gen: Awake and alert   Eyes: PERRL. No sclera icterus. No conjunctival injection. ENT: No discharge. Pharynx clear. External appearance of ears and nose normal.  Neck: Trachea midline. No obvious mass. Resp: Poor air movement   CV: Regular rate. Regular rhythm. No murmur or rub. GI: Non-tender. Non-distended. No hernia. Skin: Warm, dry, normal texture and turgor. No nodule on exposed extremities. Lymph: No cervical LAD. No supraclavicular LAD. M/S: No cyanosis. No clubbing. No joint deformity.     Neuro: Awake and alert   Ext:   trace edema    Medications:    Scheduled Meds:   insulin glargine  10 Units Subcutaneous Daily    methylPREDNISolone  40 mg Intravenous Q12H    cefTRIAXone (ROCEPHIN) IV  1 g Intravenous Q12H    insulin lispro  0-18 Units Subcutaneous TID WC    insulin lispro  0-9 Units Subcutaneous Nightly    sodium chloride flush  10 mL Intravenous 2 times per day    enoxaparin  40 mg Subcutaneous Daily    ipratropium-albuterol  1 ampule Inhalation Q4H WA    hydroCHLOROthiazide  25 mg Oral Daily    rosuvastatin  5 mg Oral Nightly       Continuous Infusions:   dextrose         PRN Meds:  glucose, dextrose, glucagon (rDNA), dextrose, albuterol, sodium chloride flush, acetaminophen **OR** acetaminophen, ondansetron    Labs:  CBC:   Recent Labs 20  0456 20  0537 20  0556   WBC 6.7 4.6 5.4   HGB 10.5* 10.3* 10.8*   HCT 32.9* 31.3* 33.2*   MCV 89.8 90.3 88.6   * 128* 134*     BMP:   Recent Labs     20  0456 20  0537 20  0556    136 138   K 5.1 4.2 4.4   CL 91* 89* 90*   CO2 39* 41* 41*   PHOS 2.4* 2.6 2.2*   BUN 15 24* 23*   CREATININE 0.7 0.8 0.7     LIVER PROFILE:   Recent Labs     20  1711   AST 16   ALT 11   BILIDIR <0.2   BILITOT 0.8   ALKPHOS 82     PT/INR: No results for input(s): PROTIME, INR in the last 72 hours. APTT: No results for input(s): APTT in the last 72 hours. UA:  Recent Labs     20  1726   COLORU YELLOW   PHUR 5.0  5.5   WBCUA 1   RBCUA 6*   CLARITYU Clear   SPECGRAV 1.028   LEUKOCYTESUR Negative   UROBILINOGEN 0.2   BILIRUBINUR Negative   BLOODU TRACE*   GLUCOSEU >=1000*     No results for input(s): PH, PCO2, PO2 in the last 72 hours. Films:  Chest imaging reports were reviewed and imaging was reviewed by me and showed no new films     AB.38/78/107    Cultures:  Blood:  Staph, Diphtheroids     I reviewed the labs and images listed above    Assessment:   · Acute on Chronic Hypoxic/Hypercapnic Respiratory failure  · Acute Metabolic Encephalopathy   · Obstructive Lung Disease with FEV1 47% (severe at baseline)  · NANCY/OHS      Plan:  · HS BPAP  Titrate oxygen for saturations greater than or equal to 90%  · Avoid centraling acting agents as it will precipitate narcosis  · Need home NIV machine to prevent recurrent admission for hypercapnic respiratory failure   · Dc IV solumedrol  · Prednisone for 5 days  · DVT Prophylaxis with Lovenox     Can dc home when she has NIV . She has to use it. She cannot refuse it. 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 PCO2 is 74 at baseline. Due to severity of disease, Bipap / Bipap ST have been ruled out.    her severe condition will derive the most benefit from NIV.  Without NIV, Vincent Andersen will be at risk for complications due to hypercapnia which could lead to hospitalizations or death. Without NIV, she has had multiple admissions for acute on chronic hypercapnia.   This has not been ordered yet secondary to patient not willing to use it as of right now.         Catherine Mcnair DO  [facility] Pulmonary

## 2020-09-26 NOTE — PROGRESS NOTES
[facility]  Respiratory Therapy  Overnight Oximetry    Name:  [address] [place] [address],Second Floor Record Number:  [id number]  Age: 80 y.o.   : 1935  Today's Date:  2020  Room:  I7B-9847/5133-01      Assessment      BP (!) 160/73   Pulse 98   Temp 98.3 °F (36.8 °C) (Axillary)   Resp 18   Ht 5' (1.524 m)   Wt 234 lb 5.6 oz (106.3 kg)   SpO2 99%   BMI 45.77 kg/m²     Patient Active Problem List   Diagnosis    Abscess of leg, right    COPD exacerbation (HCC)    COPD (chronic obstructive pulmonary disease) (HCC)    Kidney mass    Hepatobiliary duct injury    Asthma exacerbation    DM (diabetes mellitus) (HCC)    Choledocholithiasis    NANCY (obstructive sleep apnea)    Morbid obesity (HCC)    Cholecystitis    Epigastric pain    Acute respiratory failure with hypoxia and hypercapnia (HCC)    Acute metabolic encephalopathy    Respiratory failure with hypercapnia (HCC)    Environmental allergies    Hyperglycemia    Acute cystitis without hematuria    Acute encephalopathy    Respiratory acidosis    CO2 narcosis       Social History  Social History     Tobacco Use    Smoking status: Former Smoker     Types: Cigarettes     Last attempt to quit: 1999     Years since quittin.7    Smokeless tobacco: Never Used   Substance Use Topics    Alcohol use: No    Drug use: No         Patient has been placed on recording overnight pulse oximeter and was placed on 2 L/min O2 via nasal cannula. RN was notified. Patient and RN are both aware that the patient should not become disconnected from pulse oximeter until RT removes the unit from the room in the morning. RN is aware that the patient must desat to 85% for 5 minutes before being placed on oxygen. Patient's SpO2 is currently 97% on 2 L/min O2 via nasal cannula. Will continue to monitor.     Electronically signed by Mejia Blanco RCP on 2020 at 10:10 PM        Patient/caregiver was educated on the proper method of use:  Yes      Level of patient/caregiver understanding able to:   [x] Verbalize understanding   [] Demonstrate understanding       [] Teach back        [] Needs reinforcement        []  No available caregiver               []  Other:     Response to education:  Good     Teaching Time:  5  minutes     Electronically signed by Suni Borden RCP on 9/25/2020 at 10:10 PM

## 2020-09-26 NOTE — PLAN OF CARE
Problem: Falls - Risk of:  Goal: Will remain free from falls  Description: Will remain free from falls  9/26/2020 0015 by Treva Gilbert RN  Outcome: Ongoing  9/25/2020 1124 by Maurisio Koehler RN  Outcome: Ongoing  Goal: Absence of physical injury  Description: Absence of physical injury  9/26/2020 0015 by Treva Gilbert RN  Outcome: Ongoing  9/25/2020 1124 by Maurisio Koehler RN  Outcome: Ongoing     Problem: Skin Integrity:  Goal: Will show no infection signs and symptoms  Description: Will show no infection signs and symptoms  9/26/2020 0015 by Treva Gilbert RN  Outcome: Ongoing  9/25/2020 1124 by Maurisio Koehler RN  Outcome: Ongoing  Goal: Absence of new skin breakdown  Description: Absence of new skin breakdown  9/26/2020 0015 by Treva Gilbert RN  Outcome: Ongoing  9/25/2020 1124 by Maurisio Koehler RN  Outcome: Ongoing     Problem: Coping:  Goal: Ability to remain calm will improve  Description: Ability to remain calm will improve  9/26/2020 0015 by Treva Gilbert RN  Outcome: Ongoing  9/25/2020 1124 by Maurisio Koehler RN  Outcome: Ongoing     Problem: Safety:  Goal: Ability to remain free from injury will improve  Description: Ability to remain free from injury will improve  9/26/2020 0015 by Treva Gilbert RN  Outcome: Ongoing  9/25/2020 1124 by Maurisio Koehler RN  Outcome: Ongoing     Problem: Self-Care:  Goal: Ability to participate in self-care as condition permits will improve  Description: Ability to participate in self-care as condition permits will improve  9/26/2020 0015 by Treva Gilbert RN  Outcome: Ongoing  9/25/2020 1124 by Maurisio Koehler RN  Outcome: Ongoing     Problem: Nutrition  Goal: Optimal nutrition therapy  9/26/2020 0015 by Treva Gilbert RN  Outcome: Ongoing  9/25/2020 1124 by Maurisio Koehler RN  Outcome: Ongoing

## 2020-09-26 NOTE — PROGRESS NOTES
6. 7   HGB 10.3* 10.8* 11.0*   HCT 31.3* 33.2* 32.7*   MCV 90.3 88.6 87.4   * 134* 130*     BMP:   Recent Labs     09/24/20  0537 09/25/20  0556 09/26/20  0533    138 134*   K 4.2 4.4 3.9   CL 89* 90* 88*   CO2 41* 41* 43*   PHOS 2.6 2.2* 2.4*   BUN 24* 23* 22*   CREATININE 0.8 0.7 0.7     LIVER PROFILE: No results for input(s): AST, ALT, LIPASE, BILIDIR, BILITOT, ALKPHOS in the last 72 hours. Invalid input(s): AMYLASE,  ALB  PT/INR: No results for input(s): PROTIME, INR in the last 72 hours. APTT: No results for input(s): APTT in the last 72 hours. UA:No results for input(s): NITRITE, COLORU, PHUR, LABCAST, WBCUA, RBCUA, MUCUS, TRICHOMONAS, YEAST, BACTERIA, CLARITYU, SPECGRAV, LEUKOCYTESUR, UROBILINOGEN, BILIRUBINUR, BLOODU, GLUCOSEU, AMORPHOUS in the last 72 hours. Invalid input(s): Abdi Plump  No results for input(s): PH, PCO2, PO2 in the last 72 hours. Cx:      Films:  Radiology Review:  Pertinent images / reports were reviewed as a part of this visit. CT Chest w/ contrast: No results found for this or any previous visit. CT Chest w/o contrast: No results found for this or any previous visit. CTPA:   Results for orders placed during the hospital encounter of 06/14/20   CT CHEST PULMONARY EMBOLISM W CONTRAST    Narrative EXAMINATION:  CTA OF THE CHEST 6/14/2020 3:38 am    TECHNIQUE:  CTA of the chest was performed after the administration of intravenous  contrast.  Multiplanar reformatted images are provided for review. MIP  images are provided for review. Dose modulation, iterative reconstruction,  and/or weight based adjustment of the mA/kV was utilized to reduce the  radiation dose to as low as reasonably achievable. COMPARISON:  Chest radiographs 06/14/2020, 05/09/2020    HISTORY:  ORDERING SYSTEM PROVIDED HISTORY: Dyspnea, negative chest x-ray. TECHNOLOGIST PROVIDED HISTORY:  Reason for exam:->Dyspnea, negative chest x-ray.   Reason for Exam: dyspnea, negative cxr    FINDINGS:  Pulmonary Arteries: Pulmonary arteries are adequately opacified for  evaluation. No evidence of intraluminal filling defect to suggest pulmonary  embolism. Main pulmonary artery is upper normal in caliber. Mediastinum: No enlarged lymph nodes. Normal caliber thoracic aorta without  evidence of aneurysm or dissection. Normal heart size and pericardium. Moderate multivessel coronary calcifications. Small hiatal hernia. Heterogeneous enlarged right thyroid lobe. Lungs/pleura: No pneumothorax, pleural effusion or consolidative airspace  disease. Smooth interlobular septal thickening at the lung bases and lung  apices. Mild diffuse bronchial wall thickening. Upper Abdomen: Status post cholecystectomy. Soft Tissues/Bones: No acute soft tissue or osseous abnormality. Thoracic  spondylosis. Impression Negative for acute pulmonary embolism. Mild interstitial pulmonary edema. Moderate multivessel coronary calcifications. Heterogeneous enlarged right thyroid lobe. Recommend follow-up thyroid  ultrasound. CXR PA/LAT:   Results for orders placed during the hospital encounter of 09/22/20   XR CHEST (2 VW)    Narrative EXAMINATION:  TWO XRAY VIEWS OF THE CHEST    9/26/2020 12:18 pm    COMPARISON:  09/22/2020. HISTORY:  ORDERING SYSTEM PROVIDED HISTORY: SOB, PNA? TECHNOLOGIST PROVIDED HISTORY:  Reason for exam:->SOB, PNA? Reason for Exam: COPD,SOB, PNA? Acuity: Acute  Type of Exam: Initial    FINDINGS:  The cardiomediastinal silhouette is stable. Aortic vascular calcification. Increased opacity over the bases likely related to overlying soft tissues. There are small bilateral pleural effusions with blunting of the posterior  costophrenic sulci. No focal infiltrate or evidence of overt failure. No  acute osseous findings. Impression Small bilateral pleural effusions. No focal infiltrate or overt failure.          CXR portable:   Results for orders placed during the hospital encounter of 09/22/20   XR CHEST PORTABLE    Narrative EXAMINATION:  ONE XRAY VIEW OF THE CHEST    9/22/2020 3:49 pm    COMPARISON:  PATRICA OTT, 06/14/2020    HISTORY:  ORDERING SYSTEM PROVIDED HISTORY: altered mental status, decreased bilateral  breath sounds, COPD  TECHNOLOGIST PROVIDED HISTORY:  Reason for exam:->altered mental status, decreased bilateral breath sounds,  COPD  Reason for Exam: ams; descrease bilateral breathing sounds; COPD  Acuity: Acute  Type of Exam: Initial    FINDINGS:  The patient is rotated slightly. The cardiac silhouette, mediastinal hilar contours are stable. There is no  focal airspace disease. No pneumothorax. Impression No acute cardiopulmonary disease is appreciated. Assessment:     Acute on chronic hypercapnic respiratory failure  Metabolic encephalopathy secondary to hypercapnia  Obesity with a but is a high ventilation syndrome  Obstructive lung disease, FEV1 47%       Plan:        -Continues to do well. Continue BiPAP at night. Noninvasive ventilator order has been submitted. She is okay to discharge as soon as this is set up. Continue prednisone x5 days.  -No change in current respiratory medications. This note was transcribed using [id number] [address] [employer]. Please disregard any translational errors.       [facility], [phone]

## 2020-09-26 NOTE — PROGRESS NOTES
Pt calm and cooperative overnight. RT did a sleep study where pt remained on 2L NC. Pt sats did not drop below 90 throughout test. No notable status changes overnight.      Electronically signed by Oksana House RN on 9/26/2020 at 6:43 AM

## 2020-09-26 NOTE — PROGRESS NOTES
Hospitalist Progress Note      PCP: Fabio Hernández MD    Chief Complaint. Presented to hospital for shortness of breath    Date of Admission: 9/22/2020      Subjective:   denies chest pain, nausea, vomiting, mentions her shortness of breath is better, and has no fever or chills. mention feels overall better. She complains of constipation today. Medications:  Reviewed    Infusion Medications    dextrose       Scheduled Medications    polyethylene glycol  17 g Oral Daily    cefTRIAXone (ROCEPHIN) IV  2 g Intravenous Q24H    predniSONE  40 mg Oral Daily    insulin lispro  16-20 Units Subcutaneous 4x Daily AC & HS    insulin glargine  12 Units Subcutaneous Daily    sodium chloride flush  10 mL Intravenous 2 times per day    enoxaparin  40 mg Subcutaneous Daily    ipratropium-albuterol  1 ampule Inhalation Q4H WA    hydroCHLOROthiazide  25 mg Oral Daily    rosuvastatin  5 mg Oral Nightly     PRN Meds: albuterol, glucose, dextrose, glucagon (rDNA), dextrose, sodium chloride flush, acetaminophen **OR** acetaminophen, ondansetron      Intake/Output Summary (Last 24 hours) at 9/26/2020 1548  Last data filed at 9/26/2020 1229  Gross per 24 hour   Intake 380 ml   Output 1600 ml   Net -1220 ml       Physical Exam Performed:    BP (!) 104/59   Pulse 112   Temp 98 °F (36.7 °C) (Oral)   Resp 20   Ht 5' (1.524 m)   Wt 233 lb 14.5 oz (106.1 kg)   SpO2 96%   BMI 45.68 kg/m²     General appearance: No apparent distress,   HEENT:  Conjunctivae/corneas clear. Neck: Supple, with full range of motion. Respiratory: Decreased breathing sounds bilaterally, no respiratory distress noticed  Cardiovascular: Regular rate and rhythm with normal S1/S2 without murmurs or rubs  Abdomen: Soft, non-tender, non-distended, normal bowel sounds. Musculoskeletal: No cyanosis or edema bilaterally  Neurologic:  without any focal sensory/motor deficits.  grossly non-focal.  Psychiatric: Alert and oriented, Normal mood  Peripheral Pulses: +2 palpable, equal bilaterally       Labs:   Recent Labs     09/24/20  0537 09/25/20  0556 09/26/20  0533   WBC 4.6 5.4 6.7   HGB 10.3* 10.8* 11.0*   HCT 31.3* 33.2* 32.7*   * 134* 130*     Recent Labs     09/24/20  0537 09/25/20  0556 09/26/20  0533    138 134*   K 4.2 4.4 3.9   CL 89* 90* 88*   CO2 41* 41* 43*   BUN 24* 23* 22*   CREATININE 0.8 0.7 0.7   CALCIUM 8.1* 8.0* 8.2*   PHOS 2.6 2.2* 2.4*     No results for input(s): AST, ALT, BILIDIR, BILITOT, ALKPHOS in the last 72 hours. No results for input(s): INR in the last 72 hours. No results for input(s): Mick Morral in the last 72 hours. Urinalysis:      Lab Results   Component Value Date    NITRU Negative 09/22/2020    WBCUA 1 09/22/2020    BACTERIA 4+ 05/09/2020    RBCUA 6 09/22/2020    BLOODU TRACE 09/22/2020    SPECGRAV 1.028 09/22/2020    GLUCOSEU >=1000 09/22/2020       Radiology:  XR CHEST (2 VW)   Final Result   Small bilateral pleural effusions. No focal infiltrate or overt failure. CT Head WO Contrast   Final Result   1. No acute intracranial hemorrhage, intra-axial mass, or acute territorial   infarct   2. Chronic sphenoid sinus disease         XR CHEST PORTABLE   Final Result   No acute cardiopulmonary disease is appreciated.                Assessment/Plan:    Active Hospital Problems    Diagnosis    [facility] [J44.1]     Priority: High    CO2 narcosis [R06.89]    Acute encephalopathy [G93.40]    Respiratory acidosis [E87.2]    Respiratory failure with hypercapnia (HCC) [J96.92]    Acute metabolic encephalopathy [U29.48]    Morbid obesity (HCC) [E66.01]    DM (diabetes mellitus) ([facility] [E11.9]    NANCY (obstructive sleep apnea) [G47.33]    Asthma exacerbation [J45.901]     Patient is a 80-year-old female with past medical history of COPD, morbid obesity, obstructive sleep apnea who presented to hospital for shortness of breath    Assessment  Acute on chronic hypercapnic

## 2020-09-26 NOTE — PROGRESS NOTES
Overnight oximetry study completed. Data downloaded, printed, and placed in patient's chart.     Electronically signed by Alyssia Perez RCP on 9/26/2020 at 5:07 AM

## 2020-09-27 NOTE — PLAN OF CARE
Problem: Falls - Risk of:  Goal: Will remain free from falls  Description: Will remain free from falls  Outcome: Ongoing       Problem: Nutrition  Goal: Optimal nutrition therapy  Outcome: Ongoing     Problem: Safety:  Goal: Ability to remain free from injury will improve  Description: Ability to remain free from injury will improve  Outcome: Ongoing

## 2020-09-27 NOTE — PROGRESS NOTES
texture, turgor normal.  No rashes or lesions. Neurologic:  Neurovascularly intact without any focal sensory/motor deficits. Cranial nerves: II-XII intact, grossly non-focal.  Psychiatric: Alert and oriented, thought content appropriate, normal insight  Capillary Refill: Brisk,< 3 seconds   Peripheral Pulses: +2 palpable, equal bilaterally       Labs:   Recent Labs     09/25/20  0556 09/26/20  0533 09/27/20  0457   WBC 5.4 6.7 9.7   HGB 10.8* 11.0* 11.7*   HCT 33.2* 32.7* 35.6*   * 130* 146     Recent Labs     09/25/20  0556 09/26/20  0533 09/27/20  0457    134* 136   K 4.4 3.9 3.8   CL 90* 88* 89*   CO2 41* 43* 40*   BUN 23* 22* 28*   CREATININE 0.7 0.7 0.9   CALCIUM 8.0* 8.2* 8.5   PHOS 2.2* 2.4* 3.2     No results for input(s): AST, ALT, BILIDIR, BILITOT, ALKPHOS in the last 72 hours. No results for input(s): INR in the last 72 hours. No results for input(s): Les Kerwin in the last 72 hours. Urinalysis:      Lab Results   Component Value Date    NITRU Negative 09/22/2020    WBCUA 1 09/22/2020    BACTERIA 4+ 05/09/2020    RBCUA 6 09/22/2020    BLOODU TRACE 09/22/2020    SPECGRAV 1.028 09/22/2020    GLUCOSEU >=1000 09/22/2020       Radiology:  XR CHEST (2 VW)   Final Result   Small bilateral pleural effusions. No focal infiltrate or overt failure. CT Head WO Contrast   Final Result   1. No acute intracranial hemorrhage, intra-axial mass, or acute territorial   infarct   2. Chronic sphenoid sinus disease         XR CHEST PORTABLE   Final Result   No acute cardiopulmonary disease is appreciated.                  Assessment/Plan:    Active Hospital Problems    Diagnosis Date Noted    COPD exacerbation ([facility] [J44.1] 08/07/2014     Priority: High    CO2 narcosis [R06.89]     Acute encephalopathy [G93.40] 09/22/2020    Respiratory acidosis [E87.2] 09/22/2020    Respiratory failure with hypercapnia (HCC) [J96.92]     Acute metabolic encephalopathy [E76.39]     [facility] [E66.01] 10/01/2015    DM (diabetes mellitus) ([facility] [E11.9] 08/11/2014    NANCY (obstructive sleep apnea) [G47.33] 08/11/2014    Asthma exacerbation [J45.901] 08/09/2014       Patient is a 25-year-old female with past medical history of COPD, morbid obesity, obstructive sleep apnea who presented to hospital for shortness of breath       Acute on chronic hypercapnic respiratory failure, likely COPD exacerbation:  -Continue oxygen support, continue steroid, DuoNeb every 4 hours  - pulmonary following: NIV device set up per pulmonary. Social work is working on obtaining the device prior to discharge  -Benadryl ordered to help anxiety with BiPAP    Metabolic encephalopathy secondary to hypercapnia:  -Resolved, continue supportive care    Positive blood culture with diphtheroids, staph likely contamination:  -Repeat blood cultures on 924-  -Rocephin stopped    Diabetes mellitus 2:  -Insulin sliding scale, continue long-acting insulin 12 units nightly       DVT Prophylaxis: Lovenox  Diet: DIET CARB CONTROL; Carb Control: 4 carb choices (60 gms)/meal; No Added Salt (3-4 GM);  Dental Soft  Code Status: Full Code    PT/OT Eval Status: Home PT    Dispo -PCU, is medically ready for discharge when NIV device is obtained    Florentino Harrington MD

## 2020-09-28 NOTE — PROGRESS NOTES
Bedside FEV1/FVC ratio was reported to me as 56% of predicted. It is not >70%, therefore she will not be able to qualify for Bilevel. She needs to follow up with me and get a PSG.   She has no choice if she wants to prevent readmissions    Yong Bragg, DO [facility]  [phone]

## 2020-09-28 NOTE — PROGRESS NOTES
I spoke with PJ at [employer]. Kari Pérez does not qualify for NIV or BPAP at this time due to various factors. She might be able to qualify for BPAP if she shows restriction on bedside PFT. I ordered the study stat to see if she shows restriction. Last PFT was in 2013 (no restriction)     I spoke with RT Donell Harden to facilitate this.       DO Jodee Landry [facility], Sleep and Critical Care  [phone]

## 2020-09-28 NOTE — PROGRESS NOTES
Patient's  is requesting that Dr. Emi Christian call him for more information on the plan for the NIV and potential outpatient sleep study. Patient's  has contacted the insurance company personally and obtained a provider authorization number [id number]. The patient's  is requesting the doctor call this number to explain the importance of needing the NIV and to accept to pay for it.

## 2020-09-28 NOTE — PROCEDURES
Please note that these measurements are obtained by bedside spirometry while the patient was hospitalized which may affect the values obtained. 1. Flows: Flow measurements, as determined by FEV1 and FVC, demonstrate the presence of an obstructive pulmonary defect. The obstruction is severe as defined by an FEV1 between 35-49% predicted. FEV1 is 0.55L, 38% predicted. FVC is 0.98L, 52% predicted. Summary:  Severe lower airflow obstruction based on bedside spirometry. Concomitant restriction cannot be excluded. OBSTRUCTION % Predicted FEV1   MILD >70%   MODERATE 60-69%   MODERATELY-SEVERE 50-59%   SEVERE 35-49%   VERY SEVERE <35%         RESTRICTION % Predicted TLC   MILD 66-80%   MODERATE 54-65%   MODERATELY-SEVERE <54%                 DIFFUSION CAPACITY DLCO % Pred   MILD >60% AND < LLN   MODERATE 40-60%   SEVERE <40%       PFT data will be scanned into the media tab under this encounter. Please see the scanned data for numerical values.      MD Corey Sanches Number Pulmonology, Critical Care and Sleep

## 2020-09-28 NOTE — CARE COORDINATION
[facility]  Received referral from case management Gayle Benjamin  Faxed orders to [facility] to resume care.    Home care to see patient by   9/30/2020  Glennon Dancer LPN    [facility] CTN Cell [phone], Fax [phone]

## 2020-09-28 NOTE — PROGRESS NOTES
primary encounter diagnosis was Acute on chronic respiratory failure with hypercapnia ([facility] Diagnoses of Encephalopathy acute and CO2 narcosis were also pertinent to this visit. has a past medical history of Arthritis, Asthma, Cancer ([facility] Chronic kidney disease, COPD (chronic obstructive pulmonary disease) ([facility] Glaucoma, Heart burn, Hyperlipidemia, Hypertension, MRSA (methicillin resistant Staphylococcus aureus) colonization, Other disorders of kidney and ureter in diseases classified elsewhere, and Type II or unspecified type diabetes mellitus without mention of complication, not stated as uncontrolled. has a past surgical history that includes back surgery; Tubal ligation; Appendectomy; other surgical history; Kidney cyst removal; Bunionectomy; Eye surgery; and Cholecystectomy, laparoscopic (01/18/2018). Restrictions  Restrictions/Precautions  Restrictions/Precautions: Fall Risk  Position Activity Restriction  Other position/activity restrictions: 2L O2 (pt on 2 liters at home)  Subjective   General  Chart Reviewed: Yes  Additional Pertinent Hx: pt is an 81 yo female who was admitted to [place] [address] with lethargy 2/2 acute respiratory hypercapnic failure  Response To Previous Treatment: Not applicable  Family / Caregiver Present: No  Subjective  Subjective: pt very pleasant today; agreeable to getting up with therapy; pt reports pain in L SI joint area          Orientation     Cognition   Cognition  Overall Cognitive Status: WFL  Objective   Bed mobility  Sit to Supine:  Moderate assistance;2 Person assistance(increased assist due to height of stretcher)  Comment: pt up in chair at beginning of session  Transfers  Sit to Stand: Contact guard assistance  Stand to sit: Contact guard assistance  Ambulation  Ambulation?: Yes  Ambulation 1  Surface: level tile  Device: Rolling Walker  Assistance: Contact guard assistance  Quality of Gait: slow small steps; antalgic gait due to pain in L SI joint area  Distance: 25'x3     Balance  Standing - Static: Fair(with walker)  Standing - Dynamic: Fair(with walker)  Exercises  Comments: completed some muscle engery techniques to try and decrease L SI joint pain         Comment: pt attempted to use commode but unable to have a BM; assisted pt with getting onto stretcher at end of session to go to CT               AM-PAC Score  AM-PAC Inpatient Mobility Raw Score : 17 (09/28/20 1100)  AM-PAC Inpatient T-Scale Score : 42.13 (09/28/20 1100)  Mobility Inpatient CMS 0-100% Score: 50.57 (09/28/20 1100)  Mobility Inpatient CMS G-Code Modifier : CK (09/28/20 1100)          Goals  Short term goals  Time Frame for Short term goals: by discharge  Short term goal 1: bed mob min A  Short term goal 2: transfers SBA  Short term goal 3: amb 22' with RW CGA  Patient Goals   Patient goals : to go home    Plan    Plan  Times per week: 3-5  Current Treatment Recommendations: Functional Mobility Training  Safety Devices  Type of devices: (pt taken by transporter to CT)     Therapy Time   Individual Concurrent Group Co-treatment   Time In 1000         Time Out 1059         Minutes 59                 CHEIKH ADLER, PT

## 2020-09-28 NOTE — DISCHARGE INSTR - COC
Continuity of Care Form    Patient Name: Hipolito Whittaker   :  1935  MRN:  [id number]    Admit date:  2020  Discharge date:  2020    Code Status Order: Full Code   Advance Directives:   [facility] Documentation       Date/Time Healthcare Directive Type of Healthcare Directive Copy in [address] Agent's Name Healthcare Agent's Phone Number    [phone]  Yes, patient has an advance directive for healthcare treatment  --  --  --  --  --            Admitting Physician:  Juan Austin DO  PCP: Koki Jay MD    Discharging Nurse: [facility] Unit/Room#: M3O-4954/9888-49  Discharging Unit Phone Number: [phone]    Emergency Contact:   Extended Emergency Contact Information  Primary Emergency Contact: Alexus Mcnulty  Address: [address] [place] [address], [address] [place] [address] Phone: [phone]  Work Phone: [phone]  Mobile Phone: [phone]  Relation: Spouse  Secondary Emergency Contact: Fernanda Buckner[address] Phone: [phone]  Work Phone: [phone]  Relation: Child    Past Surgical History:  Past Surgical History:   Procedure Laterality Date    APPENDECTOMY      [address], LAPAROSCOPIC  2018    EYE SURGERY      KIDNEY CYST REMOVAL      OTHER SURGICAL HISTORY      CT guided left renal mass microwave ablation     TUBAL LIGATION         Immunization History:   Immunization History   Administered Date(s) Administered    Influenza Vaccine, unspecified formulation 2014, 2017, 2019    Influenza Virus Vaccine 2014, 2014, 2016, 2017, 10/25/2018, 2019    Pneumococcal Conjugate 13-valent (Twbgzuo89) 2016    Pneumococcal Polysaccharide (Onoehgorf39) 2001, 2014    Tdap (Boostrix, Adacel) 2014       Active Problems:  Patient Active Problem List   Diagnosis Code    Abscess of leg, right L02.415    COPD exacerbation ([facility]) J44.1    COPD (chronic obstructive pulmonary disease) (HCC) J44.9    Kidney mass N28.89    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 0    [facility]diabetes mellitus[facility] E11.9    Choledocholithiasis K80.50    NANCY (obstructive sleep apnea) G47.33    Morbid obesity ([facility]) E66.01    Cholecystitis K81.9    Epigastric pain R10.13    Acute respiratory failure with hypoxia and hypercapnia (HCC) J96.01, J96.02    Acute metabolic encephalopathy V49.40    Respiratory failure with hypercapnia ([facility]) J96.92    Environmental allergies Z91.09    Hyperglycemia R73.9    Acute cystitis without hematuria N30.00    Acute encephalopathy G93.40    Respiratory acidosis E87.2    CO2 narcosis R06.89       Isolation/Infection:   Isolation            No Isolation          Patient Infection Status       Infection Onset Added Last Indicated Last Indicated By Review Planned Expiration Resolved Resolved By    None active    Resolved    COVID-19 Rule Out 06/14/20 06/14/20 06/14/20 Covid-19 Ambulatory (Ordered)   06/15/20 Rule-Out Test Resulted    COVID-19 Rule Out 05/09/20 05/09/20 05/09/20 COVID-19 (Ordered)   05/09/20 Rule-Out Test Resulted    MRSA  08/10/14 08/10/14 Nathaniel Basilio RN   10/02/15 Nathaniel Basilio RN            Nurse Assessment:  Last Vital Signs: BP (!) 158/77   Pulse 110   Temp 98.1 °F (36.7 °C) (Oral)   Resp 17   Ht 5' (1.524 m)   Wt 235 lb 7.2 oz (106.8 kg)   SpO2 93%   BMI 45.98 kg/m²     Last documented pain score (0-10 scale): Pain Level: 0  Last Weight:   Wt Readings from Last 1 Encounters:   09/28/20 235 lb 7.2 oz (106.8 kg)     Mental Status:  oriented, alert, coherent, logical, thought processes intact and able to concentrate and follow conversation    IV Access:  - None    Nursing Mobility/ADLs:  Walking   Assisted  Transfer  Assisted  Bathing  Assisted  Dressing Assisted  Toileting  Assisted  Feeding  Independent  Med Admin  Assisted  Med Delivery   whole    Wound Care Documentation and Therapy:        Elimination:  Continence:   · Bowel: Yes  · Bladder: Yes  Urinary Catheter: None   Colostomy/Ileostomy/Ileal Conduit: No       Date of Last BM: 9/26/2020    Intake/Output Summary (Last 24 hours) at 9/28/2020 1136  Last data filed at 9/28/2020 1007  Gross per 24 hour   Intake 660 ml   Output 2525 ml   Net -1865 ml     I/O last 3 completed shifts: In: 65 [P.O.:660]  Out: 1800 [Urine:1800]    Safety Concerns: At Risk for Falls    Impairments/Disabilities:      None    Nutrition Therapy:  Current Nutrition Therapy:   - Oral Diet:  Carb Control 4 carbs/meal (1800kcals/day), Dental Soft and No added salt    Routes of Feeding: Oral  Liquids: Thin Liquids  Daily Fluid Restriction: no  Last Modified Barium Swallow with Video (Video Swallowing Test): not done    Treatments at the Time of Hospital Discharge:   Respiratory Treatments: See [facility]  Oxygen Therapy:  is on oxygen at 2 L/min per nasal cannula.   Ventilator:    - No ventilator support - Follow up with Pulmonology to schedule sleep study     Rehab Therapies: Nurse, Home health aid  Weight Bearing Status/Restrictions: No weight bearing restirctions  Other Medical Equipment (for information only, NOT a DME order):  walker and bath bench  Other Treatments:     Patient's personal belongings (please select all that are sent with patient):  Dentures upper and lower    RN SIGNATURE:  Electronically signed by Shayy Kendall RN on 9/29/20 at 11:23 AM EDT    CASE MANAGEMENT/SOCIAL WORK SECTION    Inpatient Status Date: 09/22/2020    Readmission Risk Assessment Score:   28     Discharging to Facility/ Agency    Name:  [facility] Address: [address] [place] [address], [address] [facility] [place] [address]   Phone: [phone]   Fax: [phone]    / signature:Electronically signed by Jackson Singer on 9/28/2020 at 11:36 AM      PHYSICIAN SECTION    Prognosis: Good    Condition at Discharge: Stable    Rehab Potential (if transferring to Rehab): Good    Recommended Labs or Other Treatments After Discharge:   - Consult Physical Therapy for  Evaluate and Treat  - Consult Occupational Therapy for Evaluate and Treat  - Home nursing for vital sign checks and medication changes/ insulin management    Physician Certification: I certify the above information and transfer of Vonda Oscar  is necessary for the continuing treatment of the diagnosis listed and that she requires Home Care for less 30 days.      Update Admission H&P: No change in H&P    PHYSICIAN SIGNATURE:  Electronically signed by Chery Luis MD on 9/28/20 at 3:36 PM EDT

## 2020-09-28 NOTE — TELEPHONE ENCOUNTER
See Paulina Economy note that the patient doesn't qualify per [employer]'s requirements for NIV or BIPAP.      Oops didn't realize Lidya Pop was currently working on this

## 2020-09-28 NOTE — TELEPHONE ENCOUNTER
Phone call to [facility] daughter and explained to her we are working with Akua/Julita regarding the NIV order. Marcio has denied her stating she doesn't qualify and Dr. Clinton Gerard is aware of all this. We are still in the process of working with all to get her discharged on some device.

## 2020-09-28 NOTE — CARE COORDINATION
[facility]  Hypoglycemia Event and Prevention Plan      NAME: [address] RECORD NUMBER:  [id number]  AGE: 80 y.o. GENDER: female  : 1935  EPISODE DATE:  2020     Data     Recent Labs     20  0735 20  1235 20  1757 20  2125 20  0619 20  0745   POCGLU 119* 228* 198* 261* 64* 81       Medications  Scheduled Medications:   polyethylene glycol  17 g Oral Daily    predniSONE  40 mg Oral Daily    insulin lispro  16-20 Units Subcutaneous 4x Daily AC & HS    insulin glargine  12 Units Subcutaneous Daily    sodium chloride flush  10 mL Intravenous 2 times per day    enoxaparin  40 mg Subcutaneous Daily    ipratropium-albuterol  1 ampule Inhalation Q4H WA    hydroCHLOROthiazide  25 mg Oral Daily    rosuvastatin  5 mg Oral Nightly       Diet  Current diet/supplement order: DIET CARB CONTROL; Carb Control: 4 carb choices (60 gms)/meal; No Added Salt (3-4 GM); Dental Soft     Recorded PO: PO Meals Eaten (%): 76 - 100% last meal in flowsheets      Action     Physician Notified of event: Yes  Gabriela Castro MD    Recommend decreasing the Lantus to 9 - 10 units. Responce     Achieved POCT Blood Glucose greater than 70 mg/dl: Yes      Discharge planned.       Electronically signed by Roscoe Xie RN on 2020 at 8:58 AM

## 2020-09-28 NOTE — CARE COORDINATION
Acosta reviewed patient's Overnight Pulse Ox Test.    Patient does not meet [employer]'s requirements for NIV. Reviewing alternative options. Will notify Pulmn MD asap. 9/28/20 at 10:34 am - Acosta RT (TERRELL) is calling Dr. Erika Chavez office to discuss pt's insurance restrictions. 9/28/20 at 10:47 am - Acosta RT (TERRELL) spoke with Dr. Emi Christian to review pt's insurance restrictions. Dr. Emi Christian to order a PFT in order to see if patient may qualify for BIPAP instead of NIV. Awaiting PFT results. 9/28/20 at 12:25 pm - Patient does not qualify for NIV or BIPAP at this time. Dr. Kj Soto aware.      Thank you for the referral.  Electronically signed by Gopi Jacques on 9/28/2020 at 9:53 AM Cell ph# [phone]

## 2020-09-28 NOTE — CARE COORDINATION
Discharge Planning:  SW spoke with [facility] from [place] who stated that unfortunately pts does not qualify for a bi pap or a NIV. Plan remains for this pt to return home with her spouse and [facility] acute hospital upon d/c.   Electronically signed by Annelise Martin on 9/28/2020 at 1:19 PM

## 2020-09-28 NOTE — PLAN OF CARE
Problem: Falls - Risk of:  Goal: Will remain free from falls  Description: Will remain free from falls  9/28/2020 0656 by Richie Peters RN  Outcome: Ongoing     Problem: Falls - Risk of:  Goal: Absence of physical injury  Description: Absence of physical injury  9/28/2020 0656 by Richie Peters RN  Outcome: Ongoing     Problem: Skin Integrity:  Goal: Will show no infection signs and symptoms  Description: Will show no infection signs and symptoms  9/28/2020 0656 by Richie Peters RN  Outcome: Ongoing     Problem: Skin Integrity:  Goal: Absence of new skin breakdown  Description: Absence of new skin breakdown  9/28/2020 0656 by Richie Peters RN  Outcome: Ongoing     Problem: Coping:  Goal: Ability to remain calm will improve  Description: Ability to remain calm will improve  9/28/2020 0656 by Richie Peters RN  Outcome: Ongoing     Problem: Safety:  Goal: Ability to remain free from injury will improve  Description: Ability to remain free from injury will improve  9/28/2020 0656 by Richie Peters RN  Outcome: Ongoing     Problem: Self-Care:  Goal: Ability to participate in self-care as condition permits will improve  Description: Ability to participate in self-care as condition permits will improve  9/28/2020 0656 by Richie Peters RN  Outcome: Ongoing     Problem: Nutrition  Goal: Optimal nutrition therapy  9/28/2020 0656 by Richie Peters RN  Outcome: Ongoing

## 2020-09-28 NOTE — PLAN OF CARE
Problem: Falls - Risk of:  Goal: Will remain free from falls  Description: Will remain free from falls  9/28/2020 1101 by Ambrose Simmonds, RN  Outcome: Ongoing   Fall risk assessment completed per unit protocol. Patient's bed is in the lowest position, call light is within reach and the patient's room is free of clutter. The patient has been instructed to call for assistance before getting out of bed or the chair. Problem: Skin Integrity:  Goal: Will show no infection signs and symptoms  Description: Will show no infection signs and symptoms  9/28/2020 1101 by Ambrose Simmonds, RN  Outcome: Ongoing   Patient's skin has been assessed per unit protocol and the patient is being repositioned or encouraged to turn every two hours to prevent skin breakdown and promote healing.      Problem: Nutrition  Goal: Optimal nutrition therapy  9/28/2020 1101 by Ambrose Simmonds, RN  Outcome: Ongoing   Patient is able to eat meals and tolerate her diet appropriately

## 2020-09-28 NOTE — TELEPHONE ENCOUNTER
Re: Marylene Pinks    Nurse Matthieu Malcolm requests that either she be called, [phone] or call the patient in her room  ([phone]) to explain why she will not have NIV prior to discharge, and what the next plan is. Per Matthieu Malcolm, the patient would like to speak with Dr. Elli Piña directly regarding this.

## 2020-09-29 NOTE — PROGRESS NOTES
Pulmonary Progress Note    Date of Admission: 9/22/2020   LOS: 7 days       CC:  Acute hypercapnic respiratory failure    Subjective:       Ok to d/c today. shortness of breath improved. ROS:   No nausea  No Vomiting  No chest pain         PHYSICAL EXAM:   Blood pressure (!) 147/83, pulse 109, temperature 98.2 °F (36.8 °C), temperature source Oral, resp. rate 16, height 5' (1.524 m), weight 230 lb 6.1 oz (104.5 kg), SpO2 99 %, not currently breastfeeding.'  Gen: No distress. Body mass index is 44.99 kg/m². ENT:   Resp: No accessory muscle use. No crackles. No wheezes. No rhonchi. CV: Regular rate. Regular rhythm. No murmur or rub. No edema. Skin: Warm, dry, normal texture and turgor. No nodule on exposed extremities. M/S: No cyanosis. No clubbing. No joint deformity. Psych: Oriented x 3. No anxiety. Awake. Alert. Intact judgement and insight. Good Mood / Affect.   Memory appears in tact       Medications:    Scheduled Meds:   insulin lispro  0-18 Units Subcutaneous TID     insulin lispro  0-9 Units Subcutaneous Nightly    insulin glargine  10 Units Subcutaneous Daily    [Held by provider] insulin lispro  16-20 Units Subcutaneous 4x Daily     polyethylene glycol  17 g Oral Daily    sodium chloride flush  10 mL Intravenous 2 times per day    enoxaparin  40 mg Subcutaneous Daily    ipratropium-albuterol  1 ampule Inhalation Q4H WA    hydroCHLOROthiazide  25 mg Oral Daily    rosuvastatin  5 mg Oral Nightly       Continuous Infusions:   dextrose         PRN Meds:  bisacodyl, diphenhydrAMINE, albuterol, glucose, dextrose, glucagon (rDNA), dextrose, sodium chloride flush, acetaminophen **OR** acetaminophen, ondansetron    Labs reviewed:  CBC:   Recent Labs     09/27/20 0457 09/28/20 0555 09/29/20 0448   WBC 9.7 8.7 13.2*   HGB 11.7* 11.4* 10.6*   HCT 35.6* 34.1* 32.3*   MCV 87.5 87.7 88.4    129* 153     BMP:   Recent Labs     09/27/20 0457 09/28/20 0555 09/29/20 0448   NA 136 139 135*   K 3.8 3.5 3.6   CL 89* 91* 91*   CO2 40* 40* 34*   PHOS 3.2 4.6 3.9   BUN 28* 26* 29*   CREATININE 0.9 0.9 0.8     LIVER PROFILE: No results for input(s): AST, ALT, LIPASE, BILIDIR, BILITOT, ALKPHOS in the last 72 hours. Invalid input(s): AMYLASE,  ALB  PT/INR: No results for input(s): PROTIME, INR in the last 72 hours. APTT: No results for input(s): APTT in the last 72 hours. UA:No results for input(s): NITRITE, COLORU, PHUR, LABCAST, WBCUA, RBCUA, MUCUS, TRICHOMONAS, YEAST, BACTERIA, CLARITYU, SPECGRAV, LEUKOCYTESUR, UROBILINOGEN, BILIRUBINUR, BLOODU, GLUCOSEU, AMORPHOUS in the last 72 hours. Invalid input(s): Ute Jackson  No results for input(s): PH, PCO2, PO2 in the last 72 hours. 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 - discussed with Mr Celio Montano and Dr. Shad Zhang   - not able to get NIV at d/c. She must get a PSG first.  Therefore, ok to d/c. Mr. Celio Montano expresses understanding. He called the insurance and, after 2 hours with them, knows this process is true. follow up rtan and PSG. This note was transcribed using [id number] [employer]. Please disregard any translational errors.       Howard Khanna Pulmonary, Sleep and [phone]

## 2020-09-29 NOTE — PROGRESS NOTES
I spoke with  regarding denial of bpap and NIV. He is worried about what to do with her when she leaves. He asked that I call the provider authorization number regarding approval.  I attempted to call the number and the office was closed. He knows she needs a sleep study in order to get the machine. She cannot have the test until she is discharged from hospital.  I will try to expedite the PSG when she is discharged. I reminded him that back in June she politely declined work up regarding NANCY, and now it is urgent. I will do what I can but she likely needs a PSG before she can get a machine. Until then she likely needs to avoid sedating medications and even sleep in recliner (if she has too). I will attempt a PA later on today when the offices are open.     DO Jae Acevedo Pulmonary, Sleep and Critical Care  [phone]

## 2020-09-29 NOTE — PROGRESS NOTES
I called the provider authorization number and spoke with agent. She was not able to address the problem and directed me to call another number. The problem is the number she wanted me to call was the exact same number I called. I will ask case management to assist.  It looks like she will need a PSG in order to get anything. I have no control over this.  is having issues with her being discharged without machine. There is nothing I can do.       Harley Romero DO  [facility] Pulmonary, Sleep and Critical Care  [phone]

## 2020-09-29 NOTE — PROGRESS NOTES
Notified Jaya Jain NP about patient's blood sugar and insulin sliding scale having a gap in coverage. Asked for an order to cover patient's current blood sugar of 327.

## 2020-09-29 NOTE — PLAN OF CARE
Problem: Falls - Risk of:  Goal: Will remain free from falls  Description: Will remain free from falls  Outcome: Ongoing  Note: Patient did not fall during shift. Problem: Falls - Risk of:  Goal: Absence of physical injury  Description: Absence of physical injury  Outcome: Ongoing  Note: Patient shows no signs of physical injury. Problem: Skin Integrity:  Goal: Will show no infection signs and symptoms  Description: Will show no infection signs and symptoms  Outcome: Ongoing  Note: Patient afebrile. Problem: Skin Integrity:  Goal: Absence of new skin breakdown  Description: Absence of new skin breakdown  Outcome: Ongoing  Note: Patient shows no new signs of skin break down. Problem: Coping:  Goal: Ability to remain calm will improve  Description: Ability to remain calm will improve  Outcome: Ongoing  Note: Patient was not anxious about bipap this evening      Problem: Safety:  Goal: Ability to remain free from injury will improve  Description: Ability to remain free from injury will improve  Outcome: Ongoing  Note: Patient remained free from injury this shift. Hourly rounds performed. Problem: Self-Care:  Goal: Ability to participate in self-care as condition permits will improve  Description: Ability to participate in self-care as condition permits will improve  Outcome: Ongoing  Note: Patient able to participate in self care. Problem: Nutrition  Goal: Optimal nutrition therapy  Outcome: Ongoing  Note: Patient eating well.

## 2020-09-29 NOTE — CARE COORDINATION
[facility]  Hypoglycemia Event and Prevention Plan      NAME: Edgar [address] RECORD NUMBER:  [id number]  AGE: 80 y.o. GENDER: female  : 1935  EPISODE DATE:  2020     Data     Recent Labs     20  1131 20  1612 20  2115 20  0221 20  0614 20  0659   POCGLU 300* 357* 327* 88 54* 99       Medications  Scheduled Medications:   insulin glargine  10 Units Subcutaneous Daily    insulin lispro  16-20 Units Subcutaneous 4x Daily WC    polyethylene glycol  17 g Oral Daily    predniSONE  40 mg Oral Daily    sodium chloride flush  10 mL Intravenous 2 times per day    enoxaparin  40 mg Subcutaneous Daily    ipratropium-albuterol  1 ampule Inhalation Q4H WA    hydroCHLOROthiazide  25 mg Oral Daily    rosuvastatin  5 mg Oral Nightly       Diet  Current diet/supplement order: DIET CARB CONTROL; Carb Control: 4 carb choices (60 gms)/meal; No Added Salt (3-4 GM); Dental Soft     Recorded PO: PO Meals Eaten (%): 76 - 100% last meal in flowsheets      Action      Physician Notified of event: Yes   Indira Mast MD    Recommend reducing correction plan. Consider reducing meal time correction by 20% and bedtime correction by at least 50%. Other option is to add prandial Humalog 8 - 10 units with high correction scale. Please note that there was no Lantus dose yesterday.       Responce     Achieved POCT Blood Glucose greater than 70 mg/dl: Yes      Medication plan updated: {yes no:[id number]::\"Yes\"}      Continue with current medication plan:  {yes no:376763::\"Yes\"}      Electronically signed by Bernadette Mendoza RN on 2020 at 8:15 AM

## 2020-09-29 NOTE — PROGRESS NOTES
Physical Therapy  Facility/Department: [address] PROGRESSIVE CARE  Daily Treatment Note  NAME: Ayana Esteban  : 1935  MRN: [id number]    Date of Service: 2020    Discharge Recommendations:  24 hour supervision or assist, Outpatient PT   PT Equipment Recommendations  Equipment Needed: No  Ayana Esteban scored a 17/24 on the AM-PAC short mobility form. Current research shows that an AM-PAC score of 18 or greater is typically associated with a discharge to the patient's home setting. Despite the patient's AM-PAC score and their current functional mobility deficits, it is recommended that the patient have 2-3 sessions per week of Physical Therapy at d/c to increase the patient's independence. Pt reports she has adequate assist from family and opting for OP therapy which was the plan prior to coming into the hospital.  At this time, this patient has adequate equipment and assistance to discharge home with OP PT and a follow up treatment frequency of 2-3x/wk. Please see assessment section for further patient specific details. If patient discharges prior to next session this note will serve as a discharge summary. Please see below for the latest assessment towards goals. Assessment   Body structures, Functions, Activity limitations: Decreased functional mobility   Assessment: pt continues to make slow progress in therapy; most limited by pain in L SI joint area. Pt would like to go to OP PT as she reports she was supposed to do this prior to developing worsening breathing issues which led to hospitalization. Recommend initial 24/7 assist from family and OP PT  Prognosis: Good  Patient Education: discussed trying to complete HS stretch at home  Barriers to Learning: none  REQUIRES PT FOLLOW UP: Yes  Activity Tolerance  Activity Tolerance: Patient limited by pain; Patient limited by endurance     Patient Diagnosis(es): The primary encounter diagnosis was Acute on chronic respiratory failure with hypercapnia ([facility] [address] Diagnoses of Encephalopathy acute and CO2 narcosis were also pertinent to this visit. has a past medical history of Arthritis, Asthma, Cancer ([facility] [address] Chronic kidney disease, COPD (chronic obstructive pulmonary disease) ([facility] [address] Glaucoma, Heart burn, Hyperlipidemia, Hypertension, MRSA (methicillin resistant Staphylococcus aureus) colonization, Other disorders of kidney and ureter in diseases classified elsewhere, and Type II or unspecified type diabetes mellitus without mention of complication, not stated as uncontrolled. has a past surgical history that includes back surgery; Tubal ligation; Appendectomy; other surgical history; Kidney cyst removal; Bunionectomy; Eye surgery; and Cholecystectomy, laparoscopic (01/18/2018). Restrictions  Restrictions/Precautions  Restrictions/Precautions: Fall Risk  Position Activity Restriction  Other position/activity restrictions: 2L O2 (pt on 2 liters at home)  Subjective   General  Chart Reviewed: Yes  Additional Pertinent Hx: pt is an 81 yo female who was admitted to [place] [address] with lethargy 2/2 acute respiratory hypercapnic failure 9-28 CT (-) pulmonary embolism  Response To Previous Treatment: Patient with no complaints from previous session.   Family / Caregiver Present: No  Subjective  Subjective: pt very pleasant today; agreeable to getting up with therapy; pt reports pain in L SI joint area; pt states she was going to the doctor to see about pain in her L lower back/buttocks area when she started having breathing difficulties          Orientation  Orientation  Overall Orientation Status: Within Functional Limits  Cognition   Cognition  Overall Cognitive Status: WFL  Objective   Bed mobility  Comment: pt up in chair at beginning and end of session  Transfers  Sit to Stand: Stand by assistance  Stand to sit: Stand by assistance  Ambulation  Ambulation?: Yes  Ambulation 1  Surface: level tile  Device: Rolling Walker  Assistance: Stand by assistance  Quality of Gait: slow small steps; antalgic gait due to pain in L SI joint area (limp becomes more pronounced after walking >12'  Distance: 25' x3 and 12'x2        Exercises  Comments: performed L hamstring stetch sitting in chair; STM to L buttocks/SI area due to pain         Comment: pt attempted to use commode but unable to have a BM, needed assist for cleansing as pt reports she uses a squirt bottle at home; assisted with positioning in chair for comfort with LEs elevated and pillow under knees; all needs in reach                  AM-PAC Score  AM-PAC Inpatient Mobility Raw Score : 17 (09/29/20 1003)  AM-PAC Inpatient T-Scale Score : 42.13 (09/29/20 1003)  Mobility Inpatient CMS 0-100% Score: 50.57 (09/29/20 1003)  Mobility Inpatient CMS G-Code Modifier : CK (09/29/20 1003)          Goals  Short term goals  Time Frame for Short term goals: by discharge  Short term goal 1: bed mob min A  Short term goal 2: transfers SBA  Short term goal 3: amb 22' with RW CGA  Patient Goals   Patient goals : to go home    Plan    Plan  Times per week: 3-5  Current Treatment Recommendations: Functional Mobility Training  Safety Devices  Type of devices: Call light within reach, Chair alarm in place, Left in chair, Nurse notified, Gait belt, All fall risk precautions in place     Therapy Time   Individual Concurrent Group Co-treatment   Time In 0909         Time Out 1004         Minutes 55                 CHEIKH ADLER, PT

## 2020-09-29 NOTE — PROGRESS NOTES
Hospitalist Progress Note      PCP: Chuck Madrid MD    Date of Admission: 9/22/2020    Chief Complaint: Difficulty sleeping with BiPAP    Hospital Course:    Subjective: Did not qualify for Bipap or NIV. Patient with daughter uncomfortable going home at this time. Medications:  Reviewed    Infusion Medications    dextrose       Scheduled Medications    [START ON 9/29/2020] insulin glargine  10 Units Subcutaneous Daily    insulin lispro  16-20 Units Subcutaneous 4x Daily WC    polyethylene glycol  17 g Oral Daily    predniSONE  40 mg Oral Daily    sodium chloride flush  10 mL Intravenous 2 times per day    enoxaparin  40 mg Subcutaneous Daily    ipratropium-albuterol  1 ampule Inhalation Q4H WA    hydroCHLOROthiazide  25 mg Oral Daily    rosuvastatin  5 mg Oral Nightly     PRN Meds: bisacodyl, diphenhydrAMINE, albuterol, glucose, dextrose, glucagon (rDNA), dextrose, sodium chloride flush, acetaminophen **OR** acetaminophen, ondansetron      Intake/Output Summary (Last 24 hours) at 9/28/2020 2042  Last data filed at 9/28/2020 1857  Gross per 24 hour   Intake 660 ml   Output 1825 ml   Net -1165 ml       Exam:    /75   Pulse 117   Temp 97.4 °F (36.3 °C) (Oral)   Resp 18   Ht 5' (1.524 m)   Wt 235 lb 7.2 oz (106.8 kg)   SpO2 98%   BMI 45.98 kg/m²     General appearance: No apparent distress, appears stated age and cooperative. HEENT: Pupils equal, round, and reactive to light. Conjunctivae/corneas clear. Neck: Supple, with full range of motion. No jugular venous distention. Trachea midline. Respiratory:  Normal respiratory effort. Clear to auscultation, bilaterally without Rales/Wheezes/Rhonchi. Cardiovascular: Regular rate and rhythm with normal S1/S2 without murmurs, rubs or gallops. Abdomen: Soft, non-tender, non-distended with normal bowel sounds. Musculoskeletal: No clubbing, cyanosis or edema bilaterally. Full range of motion without deformity.   Skin: Skin color, 09/22/2020    Respiratory failure with hypercapnia (HCC) [J96.92]     Acute metabolic encephalopathy [K94.48]     Morbid obesity ([facility] [address] [E66.01] 10/01/2015    DM (diabetes mellitus) ([facility] [address] [E11.9] 08/11/2014    NANCY (obstructive sleep apnea) [G47.33] 08/11/2014    Asthma exacerbation [J45.901] 08/09/2014       Patient is a 80-year-old female with past medical history of COPD, morbid obesity, obstructive sleep apnea who presented to hospital for shortness of breath       Acute on chronic hypercapnic respiratory failure, likely COPD exacerbation:  -Continue oxygen support, continue steroid, DuoNeb every 4 hours  - pulmonary following: NIV device set up per pulmonary. Social work is working on obtaining the device prior to discharge  -Benadryl ordered to help anxiety with BiPAP    Metabolic encephalopathy secondary to hypercapnia:  -Resolved, continue supportive care    Positive blood culture with diphtheroids, staph likely contamination:  -Repeat blood cultures on 924-  -Rocephin stopped    Diabetes mellitus 2:  -Insulin sliding scale, continue long-acting insulin 12 units nightly       DVT Prophylaxis: Lovenox  Diet: DIET CARB CONTROL; Carb Control: 4 carb choices (60 gms)/meal; No Added Salt (3-4 GM);  Dental Soft  Code Status: Full Code    PT/OT Eval Status: Home PT    Dispo -PCU, want to discuss with pulmonology why did not qualify for Bipap or NIV    Celestina Chang MD

## 2020-09-29 NOTE — PROGRESS NOTES
Lab called critical value, pt blood sugar was 43. Pt asymptomatic and able to take PO. Gave 120 mL of juice. Will recheck blood sugar in 15 minutes.

## 2020-10-01 NOTE — PROGRESS NOTES
Chief complaint  This is a 80y.o. year old female  who comes to see me with a chief complaint of   Chief Complaint   Patient presents with    COPD       HPI  Here with a cc of hospital follow     She was admitted for hypercapnic respiratory failure again. She required bipap and an admission to the ICU. She was denied NIV and bilevel due to lack of PSG. She could not qualify for the machines in any other way. Her stay at hospital was complicated by refusing to use bilevel one night and then she retained CO2 after being given ativan. She had been resistant to doing anything different to help herself due to lack of recognition that her breathing was a problem     She is still having some breathing and sleeping issues. She is currently on prednisone and does not like the way she feels. She only has a couple more doses. She is still on flovent and albuterol. Tends to think nebulizers are easier for her than HFA.   Daughter is present         Past Medical History:   Diagnosis Date    Arthritis     Asthma     Cancer ([facility]     kidney    Chronic kidney disease     COPD (chronic obstructive pulmonary disease) ([facility] [facility]     Glaucoma     Heart burn     Hyperlipidemia     Hypertension     MRSA (methicillin resistant Staphylococcus aureus) colonization 8/8/14    Other disorders of kidney and ureter in diseases classified elsewhere     Type II or unspecified type diabetes mellitus without mention of complication, not stated as uncontrolled        Past Surgical History:   Procedure Laterality Date    APPENDECTOMY      BACK SURGERY      BUNIONECTOMY      CHOLECYSTECTOMY, LAPAROSCOPIC  01/18/2018    EYE SURGERY      KIDNEY CYST REMOVAL      OTHER SURGICAL HISTORY      CT guided left renal mass microwave ablation     TUBAL LIGATION         Social History     Socioeconomic History    Marital status:      Spouse name: Not on file    Number of children: Not on file    Years of education: Not on meals), Disp: , Rfl:     HYDROcodone-acetaminophen (NORCO) 5-325 MG per tablet, Take 0.5 tablets by mouth every 6 hours as needed. , Disp: , Rfl:     hydroCHLOROthiazide (HYDRODIURIL) 25 MG tablet, Take 25 mg by mouth daily, Disp: , Rfl:     medroxyPROGESTERone (DEPO-PROVERA) 150 MG/ML injection, Inject 150 mg into the muscle every 3 months, Disp: , Rfl:     albuterol sulfate HFA (VENTOLIN HFA) 108 (90 Base) MCG/ACT inhaler, Inhale 2 puffs into the lungs every 6 hours as needed for Wheezing, Disp: , Rfl:     mupirocin (BACTROBAN) 2 % ointment, Apply topically 3 times daily Apply topically 3 times daily. , Disp: , Rfl:     fluticasone (FLOVENT HFA) 44 MCG/ACT inhaler, Inhale 1 puff into the lungs 2 times daily, Disp: 1 Inhaler, Rfl: 3    propranolol (INDERAL LA) 80 MG extended release capsule, Take 80 mg by mouth daily, Disp: , Rfl:     triamcinolone (KENALOG) 0.1 % cream, Bid rash, Disp: , Rfl:     cetirizine (ZYRTEC) 10 MG tablet, Take 5-10 mg by mouth daily as needed for Allergies, Disp: , Rfl:     albuterol (PROVENTIL) (5 MG/ML) 0.5% nebulizer solution, Take 1 mL by nebulization 4 times daily as needed for Wheezing, Disp: 120 each, Rfl: 3    rosuvastatin (CRESTOR) 10 MG tablet, Take 0.5 tablets by mouth nightly., Disp: 30 tablet, Rfl: 3    LUMIGAN 0.01 % SOLN, Place 1 drop into both eyes nightly., Disp: , Rfl:     LEVEMIR FLEXPEN 100 UNIT/ML injection, Inject into the skin nightly 10-18 units, Disp: , Rfl:       ROS:  GENERAL: recent admission for hypercapnia, was denied NIV and bilevel  HEENT:  No double vision, blurry vision, or difficulty swallowing  HEART:  No chest pain, no palpitations  LUNGS:  SOB all of the time with recent admission   ABDOMEN:  No abdominal pains, no changes in stools  GENITOURINARY:  No increased frequency, no blood in urine  EXTREMITIES:  No swelling, no lesions  NEURO:  No numbness or tingling, no seizures  SKIN:  No new rashes, no changes in hair or nails  LYMPH:  No masses, no swelling neck, armpits, or groin    PHYSICAL EXAM:  Vitals:    10/01/20 1110   BP: (!) 150/86   Pulse: 85   Resp: 20   Temp: 98.3 °F (36.8 °C)   SpO2: 97%       GENERAL:  Well nourished, alert, appears stated age, no distress, obese, sitting in wheelchair   HEENT:  No scleral icterus, no conjunctival irritation, Mallampati IV  NECK:  No thyromegaly, no bruits  LYMPH:  No cervical or supraclavicular adenopathy  HEART:  Regular rate and rhythm, no murmurs. Distant heart sounds   LUNGS:  Very poor air movement, obese chest wall   ABDOMEN:  No distention, no organomegaly  EXTREMITIES:  Trace edema, no digital clubbing  NEURO:  No localizing deficits, CN II-XII intact, in wheel chair    Pulmonary Function Testing 2013  Overall suggests severe obstruction but restriction was also present     Chest imaging from 9/2020 is reviewed. My interpretation is LLL atelectasis, bronchiectasis, mild mosaicism     ECHO  Nothing recent    ABG 5/2020  7.21/90/90  Lab Results   Component Value Date    WBC 13.2 (H) 09/29/2020    HGB 10.6 (L) 09/29/2020    HCT 32.3 (L) 09/29/2020    MCV 88.4 09/29/2020     09/29/2020       No results found for: BNP    Lab Results   Component Value Date    CREATININE 0.8 09/29/2020    BUN 29 (H) 09/29/2020     (L) 09/29/2020    K 3.6 09/29/2020    CL 91 (L) 09/29/2020    CO2 34 (H) 09/29/2020 9/2020:  ABG (no bilevel use) 7.23/100/184  ABG (used bilevel) 7.36/74/179    I reviewed above labs and studies pertinent to this visit and date    COPD Assessment Test    1. I never cough vs I cough all the time:  1  2. I have no phlegm vs I am completely full of phlegm. 1  3. My chest does not feel tight vs my chest feel vs tight. 1  4. Not breathless with inclines vs breathless with inclines. 5  5. Not limited with ADLs vs Very limited with ADLs. 5  6. Confidence leaving home vs no confidence. 3  7. I sleep soundly vs I don't sleep soundly. 5  8.   I have lots of energy vs I have no energy. 5    TOTAL POINTS:  26    Scoring:    A) >30. Very high impact on quality of life. Optimize therapy including rehab  B) >20. High impact on quality of life. C) 10-20. Medium impact on qualify of life  D) <10. Low impact on life  E)  5.  Upper limit of normal in health non-smoker    Assessment/Plan:  1. COPD, severe ([address]  Not controlled. She is only on flovent and albuterol. Will transition everything to nebs. Will order duonebs and pulmicort. She is to stop flovent and alb neb. She is to take pulmicort bid and duonesb at least tid but also prn. Finish prednisone. I will hold off on long acting beta agonist for now as it might overwhelm/confuse her and they she might be reluctant to do anything I ask  - Baseline Diagnostic Sleep Study; Future  - ipratropium-albuterol (DUONEB) 0.5-2.5 (3) MG/3ML SOLN nebulizer solution; Inhale 3 mLs into the lungs every 4 hours  Dispense: 360 mL; Refill: 1  - budesonide (PULMICORT) 0.25 MG/2ML nebulizer suspension; Take 2 mLs by nebulization 2 times daily  Dispense: 60 ampule; Refill: 5    2. Chronic respiratory failure with hypoxia and hypercapnia (HCC)  Uses 2 liters of oxygen with sleep and exertion. Does help her. She was denied NIV due to lack of pure restriction and lack of PSG. Will get PSG while on oxygen to get her a home machine. I hope she can stay out of the hospital until I get sleep study  - Baseline Diagnostic Sleep Study; Future    3. NANCY (obstructive sleep apnea)  Needs in lab PSG  - Baseline Diagnostic Sleep Study; Future    4. Hypersomnia  - Baseline Diagnostic Sleep Study; Future    5. BMI 45.0-49.9, adult [facility])  - Baseline Diagnostic Sleep Study; Future      Follow up after PSG and receipt of new machine. She would benefit the most from bilevel. She had recurrent hypercapnia when admitted last time. CPAP is not enough to meet her ventilatory needs.

## 2020-10-01 NOTE — DISCHARGE SUMMARY
Hospital Medicine Discharge Summary    Patient ID: Renan Jasso      Patient's PCP: Bentley Reeves MD    Admit Date: 9/22/2020     Discharge Date: 9/29/2020      Admitting Physician: Carmela Nuñez DO     Discharge Physician: Angelica Grant MD     Discharge Diagnoses: Active Hospital Problems    Diagnosis Date Noted    COPD exacerbation ([facility] [J44.1] 08/07/2014     Priority: High    CO2 narcosis [R06.89]     Acute encephalopathy [G93.40] 09/22/2020    Respiratory acidosis [E87.2] 09/22/2020    Respiratory failure with hypercapnia (HCC) [J96.92]     Acute metabolic encephalopathy [Z50.62]     Morbid obesity ([facility] [address] [E66.01] 10/01/2015    DM (diabetes mellitus) ([facility] [E11.9] 08/11/2014    NANCY (obstructive sleep apnea) [G47.33] 08/11/2014    Asthma exacerbation [J45.901] 08/09/2014       The patient was seen and examined on day of discharge and this discharge summary is in conjunction with any daily progress note from day of discharge. Hospital Course:     Patient is a 42-year-old female with past medical history of COPD, morbid obesity, obstructive sleep apnea who presented to hospital for shortness of breath        Acute on chronic hypercapnic respiratory failure, likely COPD exacerbation:  -Continue oxygen support, continue steroid, DuoNeb every 4 hours  - pulmonary following: attepmted to obtain Bipap or NIV prior to D/C but did not qualify. _. F/u with pulm outpatient with planned sleep study  .   Social work is working on obtaining the device prior to discharge  -Benadryl ordered to help anxiety with BiPAP while inpatient     Metabolic encephalopathy secondary to hypercapnia:  -Resolved, continue supportive care     Positive blood culture with diphtheroids, staph likely contamination:  -Repeat blood cultures on 924-  -Rocephin stopped     Diabetes mellitus 2:  -Insulin sliding scale, continue long-acting insulin 12 units nightly         Exam:     /60   Pulse 106   Temp 98.7 °F (37.1 °C) (Oral)   Resp 16   Ht 5' (1.524 m)   Wt 230 lb 6.1 oz (104.5 kg)   SpO2 97%   BMI 44.99 kg/m²     General appearance: No apparent distress, appears stated age and cooperative. HEENT: Pupils equal, round, and reactive to light. Conjunctivae/corneas clear. Neck: Supple, with full range of motion. No jugular venous distention. Trachea midline. Respiratory:  Normal respiratory effort. Clear to auscultation, bilaterally without Rales/Wheezes/Rhonchi. Cardiovascular: Regular rate and rhythm with normal S1/S2 without murmurs, rubs or gallops. Abdomen: Soft, non-tender, non-distended with normal bowel sounds. Musculoskelatal: No clubbing, cyanosis or edema bilaterally. Full range of motion without deformity. Skin: Skin color, texture, turgor normal.  No rashes or lesions. Neurologic:  Neurovascularly intact without any focal sensory/motor deficits. Cranial nerves: II-XII intact, grossly non-focal.  Psychiatric: Alert and oriented, thought content appropriate, normal insight      Consults:     IP CONSULT TO CASE MANAGEMENT  IP CONSULT TO HOME CARE NEEDS  IP CONSULT TO HOME CARE NEEDS  IP CONSULT TO CASE MANAGEMENT    Significant Diagnostic Studies:       Radiology:  CT CHEST PULMONARY EMBOLISM W CONTRAST   Final Result   No evidence of pulmonary embolism or acute pulmonary abnormality.           XR CHEST (2 VW)   Final Result   Small bilateral pleural effusions. No focal infiltrate or overt failure.           CT Head WO Contrast   Final Result   1. No acute intracranial hemorrhage, intra-axial mass, or acute territorial   infarct   2. Chronic sphenoid sinus disease           XR CHEST PORTABLE   Final Result   No acute cardiopulmonary disease is appreciated.             PCP/SNF to follow up:  Outpatient sleep study    Disposition:  Home    Condition on D/C:  Stable     Discharge Instructions/Follow-up:  F/u with pulmonoly and PCP within 1 week    Code Status:  Prior     Activity: activity as tolerated    Diet: cardiac and diabetic    Labs: For convenience and continuity at follow-up the following most recent labs are provided:      CBC:    Lab Results   Component Value Date    WBC 13.2 09/29/2020    HGB 10.6 09/29/2020    HCT 32.3 09/29/2020     09/29/2020       Renal:    Lab Results   Component Value Date     09/29/2020    K 3.6 09/29/2020    K 4.8 09/22/2020    CL 91 09/29/2020    CO2 34 09/29/2020    BUN 29 09/29/2020    CREATININE 0.8 09/29/2020    CALCIUM 8.4 09/29/2020    PHOS 3.9 09/29/2020       Discharge Medications:     Discharge Medication List as of 9/29/2020 11:46 AM           Details   predniSONE (DELTASONE) 20 MG tablet Take 2 tablets by mouth daily for 4 days, Disp-8 tablet,R-0Normal              Details   NOVOLOG FLEXPEN 100 UNIT/ML injection pen Inject 16 Units into the skin 3 times daily (with meals), DAWHistorical Med      HYDROcodone-acetaminophen (NORCO) 5-325 MG per tablet Take 0.5 tablets by mouth every 6 hours as needed. Historical Med      hydroCHLOROthiazide (HYDRODIURIL) 25 MG tablet Take 25 mg by mouth dailyHistorical Med      medroxyPROGESTERone (DEPO-PROVERA) 150 MG/ML injection Inject 150 mg into the muscle every 3 monthsHistorical Med      albuterol sulfate HFA (VENTOLIN HFA) 108 (90 Base) MCG/ACT inhaler Inhale 2 puffs into the lungs every 6 hours as needed for WheezingHistorical Med      mupirocin (BACTROBAN) 2 % ointment Apply topically 3 times daily Apply topically 3 times daily. , Topical, 3 TIMES DAILY, Historical Med      fluticasone (FLOVENT HFA) 44 MCG/ACT inhaler Inhale 1 puff into the lungs 2 times daily, Disp-1 Inhaler, R-3Normal      propranolol (INDERAL LA) 80 MG extended release capsule Take 80 mg by mouth dailyHistorical Med      triamcinolone (KENALOG) 0.1 % cream Bid rash, Historical Med      cetirizine (ZYRTEC) 10 MG tablet Take 5-10 mg by mouth daily as needed for AllergiesHistorical Med      acetaminophen (APAP EXTRA STRENGTH)

## 2020-10-01 NOTE — PATIENT INSTRUCTIONS
Stop flovent and albuterol nebulizer    Start taking Duoneb breathing treatments.   Take at least 3 times per day    Start Pulmicort twice a day, every day    Will order new nebulizer machine    Sleep study    Continue with oxygen    Follow up after testing

## 2020-10-02 NOTE — ED NOTES
This RN documented that Epi 1mg/10ml given. Epi 1mg/ml was actually given during code.      Marylee Jumper, RN  10/02/20 0906

## 2020-10-02 NOTE — PROGRESS NOTES
Physician Progress Note      PATIENT:               Reba Greenberg  CSN #:                  [id number]  :                       1935  ADMIT DATE:       10/2/2020 1:52 AM  DISCH DATE:  RESPONDING  PROVIDER #:        Gris Marie MD          QUERY TEXT:    Pt with COPD admitted with acute pulmonary embolism and cardiac arrest.    Intubated on arrival to ED. Please document in the progress notes and   discharge summary if you are evaluating and/or treating any of the following: The medical record reflects the following:  Risk Factors: COPD, cardiac arrest  Clinical Indicators: witnessed cardiac arrest, under CPR on arrival  Treatment: intubation, mechanical ventilation    Thank you,  Valerie Whitman RN, BSN, [email or web address]  Options provided:  -- Acute respiratory failure with hypoxia  -- Acute respiratory failure with hypercapnia  -- Acute on chronic respiratory failure with hypoxia  -- Acute on chronic respiratory failure with hypercapnia  -- Other - I will add my own diagnosis  -- Disagree - Not applicable / Not valid  -- Disagree - Clinically unable to determine / Unknown  -- Refer to Clinical Documentation Reviewer    PROVIDER RESPONSE TEXT:    This patient is in acute respiratory failure with hypoxia. Query created by: Yeimi Ochoa on 10/2/2020 8:57 AM      QUERY TEXT:    Patient with COPD admitted following cardiac arrest.  Found to have pulmonary   embolism. If possible, please document in the progress notes and discharge   summary the cause of cardiac arrest:    The medical record reflects the following:  Risk Factors: COPD, pulmonary embolism  Clinical Indicators: cardiac arrest  Treatment: intubation with mechanical ventilation    Thank you,  Valerie Whitman RN, BSN, [email or web address]  Options provided:  -- Cardiac Arrest due to Acute Respiratory Failure  -- Cardiac Arrest due to Acute Pulmonary Embolism  -- Other - I will add my own diagnosis  -- Disagree - Not applicable / Not valid  -- Disagree - Clinically unable to determine / Unknown  -- Refer to Clinical Documentation Reviewer    PROVIDER RESPONSE TEXT:    This patient had cardiac arrest due to acute respiratory failure.     Query created by: Elana Lewis on 10/2/2020 8:57 AM      Electronically signed by:  Antony Armas MD 10/2/2020 9:22 AM

## 2020-10-02 NOTE — CONSULTS
Patient is seen at the request of Dr. Meryle Drain for cardiopulmonary arrest    PCP: Natalie Mallory MD    Please note that history is obtained from chart. Patient is unable to provide a history due to intubation and encephalopathy. HISTORY OF PRESENT ILLNESS: 80y.o. year old female with asthma/COPD who was admitted on 10/1/20 for cardiac arrest.  By report she complained to her  of shortness of breath and then collapsed. He called EMS. When EMS arrived she was in PEA. She was given a total of 5 mg of epinephrine, 1 amp of sodium bicarbonate and 2 mg of Narcan. EMS continued CPR for 27 minutes until she arrived to the emergency room. In the ER, she was given 2 mg of epinephrine, 2 g of calcium gluconate and 1 amp of sodium bicarbonate. ROSC was achieved after 9 minutes in the ER.       PAST MEDICAL HISTORY:  Past Medical History:   Diagnosis Date    Arthritis     Asthma     [facility] ([facility]     kidney    Chronic kidney disease     COPD (chronic obstructive pulmonary disease) ([facility]     Glaucoma     Heart burn     Hyperlipidemia     Hypertension     MRSA (methicillin resistant Staphylococcus aureus) colonization 8/8/14    Other disorders of kidney and ureter in diseases classified elsewhere     Type II or unspecified type diabetes mellitus without mention of complication, not stated as uncontrolled        PAST SURGICAL HISTORY:  Past Surgical History:   Procedure Laterality Date    APPENDECTOMY      BACK SURGERY      BUNIONECTOMY      CHOLECYSTECTOMY, LAPAROSCOPIC  01/18/2018    EYE SURGERY      KIDNEY CYST REMOVAL      OTHER SURGICAL HISTORY      CT guided left renal mass microwave ablation     TUBAL LIGATION           MEDICATIONS:  Current Facility-Administered Medications: propofol injection, 10 mcg/kg/min, Intravenous, Once  promethazine (PHENERGAN) tablet 12.5 mg, 12.5 mg, Oral, Q6H PRN **OR** ondansetron (ZOFRAN) injection 4 mg, 4 mg, Intravenous, Q6H PRN  chlorhexidine (PERIDEX) 0.12 % solution 15 mL, 15 mL, Mouth/Throat, BID  heparin 25,000 unit in sodium chloride 0.45% 250 mL infusion, 12.5 mL/hr, Intravenous, Continuous  sodium chloride flush 0.9 % injection 10 mL, 10 mL, Intravenous, 2 times per day  sodium chloride flush 0.9 % injection 10 mL, 10 mL, Intravenous, PRN  promethazine (PHENERGAN) tablet 12.5 mg, 12.5 mg, Oral, Q6H PRN **OR** ondansetron (ZOFRAN) injection 4 mg, 4 mg, Intravenous, Q6H PRN  enoxaparin (LOVENOX) injection 40 mg, 40 mg, Subcutaneous, Daily  ipratropium-albuterol (DUONEB) nebulizer solution 1 ampule, 1 ampule, Inhalation, Q4H PRN  methylPREDNISolone sodium (SOLU-MEDROL) injection 40 mg, 40 mg, Intravenous, Q6H **FOLLOWED BY** [START ON 10/4/2020] predniSONE (DELTASONE) tablet 40 mg, 40 mg, Oral, Daily  cefTRIAXone (ROCEPHIN) 1 g IVPB in 50 mL D5W minibag, 1 g, Intravenous, Q24H  azithromycin (ZITHROMAX) 500 mg in D5W 250ml addavial, 500 mg, Intravenous, Q24H  promethazine (PHENERGAN) tablet 12.5 mg, 12.5 mg, Oral, Q6H PRN **OR** ondansetron (ZOFRAN) injection 4 mg, 4 mg, Intravenous, Q6H PRN  chlorhexidine (PERIDEX) 0.12 % solution 15 mL, 15 mL, Mouth/Throat, BID  norepinephrine (LEVOPHED) 16 mg in sodium chloride 0.9 % 250 mL infusion, 5 mcg/min, Intravenous, Continuous      ALLERGIES:  Patient is allergic to ace inhibitors; atorvastatin; ciprofloxacin; ciprofloxacin in d5w; codeine; fenofibrate; glipizide; macrobid [nitrofurantoin monohyd macro]; nitrofurantoin; pioglitazone; pravastatin; simvastatin; xalatan [latanoprost]; azithromycin; budesonide-formoterol fumarate; misoprostol; pcn [penicillins]; and sulfa antibiotics.     FAMILY HISTORY:  Unable to obtain due to intubation and encephalopathy    SOCIAL HISTORY:  Social History     Socioeconomic History    Marital status:      Spouse name: Not on file    Number of children: Not on file    Years of education: Not on file    Highest education level: Not on file   Occupational History    Not on file   Social Needs    Financial resource strain: Not on file    Food insecurity     Worry: Not on file     Inability: Not on file    Transportation needs     Medical: Not on file     Non-medical: Not on file   Tobacco Use    Smoking status: Former Smoker     Types: Cigarettes     Last attempt to quit: 1999     Years since quittin.8    Smokeless tobacco: Never Used   Substance and Sexual Activity    Alcohol use: No    Drug use: No    Sexual activity: Yes     Partners: Male   Lifestyle    Physical activity     Days per week: Not on file     Minutes per session: Not on file    Stress: Not on file   Relationships    Social connections     Talks on phone: Not on file     Gets together: Not on file     Attends Oriental orthodox service: Not on file     Active member of club or organization: Not on file     Attends meetings of clubs or organizations: Not on file     Relationship status: Not on file    Intimate partner violence     Fear of current or ex partner: Not on file     Emotionally abused: Not on file     Physically abused: Not on file     Forced sexual activity: Not on file   Other Topics Concern    Not on file   Social History Narrative    Not on file      reports that she quit smoking about 20 years ago. Her smoking use included cigarettes. She has never used smokeless tobacco.    REVIEW OF SYSTEMS:  Unable to obtain due to intubation and encephalopathy    Objective:   PHYSICAL EXAM:  Blood pressure (!) 106/49, pulse 86, temperature 98 °F (36.7 °C), temperature source Axillary, resp. rate 22, height 5' (1.524 m), weight 224 lb 10.4 oz (101.9 kg), SpO2 100 %, not currently breastfeeding. on AC 22/450/50%/5    Gen: Well developed; well nourished  Eyes: No scleral icterus. No conjunctival injection. ENT:  Oropharynx with ETT. External appearance of ears and nose normal.  Neck: Trachea midline. No obvious mass.  No visible thyroid enlargement    Respiratory: Rhonchi bilaterally, no accessory muscle use  Cardiovascular: Regular rate and rhythm, no appreciable murmurs. No edema. Gastrointestinal: Soft, non-tender. No hernia  Skin: Warm and dry. No rashes or ulcers on visible areas. Normal texture and turgor  Lymphatic: No cervical LAD. No supraclavicular LAD. Musculoskeletal: No cyanosis, clubbing or joint deformity. Psychiatric: Intubated; has a cough, but no gag; no response to pain    Data Reviewed:   LABS:  CBC:   Recent Labs     10/02/20  0217   WBC 16.2*   HGB 10.5*   HCT 34.1*   MCV 95.0        BMP:   Recent Labs     10/02/20  0217      K 4.4   CL 94*   CO2 21   BUN 29*   CREATININE 1.3*     LIVER PROFILE: No results for input(s): AST, ALT, LIPASE, BILIDIR, BILITOT, ALKPHOS in the last 72 hours. Invalid input(s): AMYLASE,  ALB  PT/INR:   Recent Labs     10/02/20  0500   PROTIME 13.6*   INR 1.17*     APTT:   Recent Labs     10/02/20  0217   APTT 30.8     Images and reports from chest imaging were reviewed by me. My interpretation is:  CXR (10/2/20): Clear lung fields; ETT and gastric tube in place  Chest CT (10/1/20): No LAD; small right lower lobe PE; septal thickening in the upper lobes and lower lobes; bilateral lower lobe atelectasis; subtle diffuse ground glass opacities bilaterally    Assessment:     Cardiopulmonary arrest  Shock  Lactic acidosis  Acute pulmonary edema  Acute kidney injury  Anoxic encephalopathy  Hyperglycemia    Plan:      -Patient with cardiopulmonary arrest at home. She received prolonged CPR and initial head CT showed changes consistent with anoxic injury. After rounds this morning patient's family arrived and decided to withdraw care. Patient passed shortly thereafter. I spent 40 minutes of critical care time with this patient excluding procedures.     Nataliia Giraldo MD  [facility] Pulmonology, Critical Care and Sleep

## 2020-10-02 NOTE — ED NOTES
Pt was brought in from home with a witnessed arrest. Pt was in bed. 30 minutes had passed once pt got to ED.      Yordy Fung RN  10/02/20 1600

## 2020-10-02 NOTE — PROGRESS NOTES
Clinical Pharmacy Note  Heparin Dosing Consult    Martha Butt is a 80 y.o. female ordered heparin per high dose nomogram by Dr. Siva Junior. Lab Results   Component Value Date    APTT 35.5 08/07/2014     Lab Results   Component Value Date    HGB 10.5 10/02/2020    HCT 34.1 10/02/2020     10/02/2020    INR 1.12 01/19/2018       Ht Readings from Last 1 Encounters:   10/01/20 5' (1.524 m)        Wt Readings from Last 1 Encounters:   10/02/20 232 lb 12.9 oz (105.6 kg)     Dosing weight: 69.5 kg    Assessment/Plan:  Initial bolus: 5600 units  Initial infusion rate: 12.5 mL/hr  Next aPTT: 1130  10/2/20    Pharmacy will continue to monitor adjust heparin based on aPTT results using nomogram below:     HIGH DOSE HEPARIN PROTOCOL (DVT/PE)     Initial Bolus: 80 units/kg Max Bolus: 10,000 units       Initial Rate: 18 units/kg/hr Max Initial Rate: 2,100 units/hr     aPTT < 36   Heparin 80 units/kg bolus Increase infusion by 4 units/kg/hr       (maximum 10,000 units)   aPTT 37-48   Heparin 40 units/kg bolus Increase infusion by 2 units/kg/hr       (maximum 5,000 units)   aPTT 49-76   No bolus   No change   aPTT 77-85   No bolus   Decrease infusion by 2 units/kg/hr   aPTT 86-94   Hold heparin for 1 hour Decrease infusion by 3 units/kg/hr   aPTT      Hold heparin for 1 hour Decrease infusion by 4 units/kg/hr   aPTT > 103   Hold heparin for 1 hour Decrease infusion by 6 units/kg/hr    Obtain aPTT 6 hours after initial bolus and 6 hours after any dose change until two consecutive therapeutic aPTTs are achieved - then daily.     Loulou Grimm, Mike  10/2/2020 5:30 AM

## 2020-10-02 NOTE — PROGRESS NOTES
Comprehensive Nutrition Assessment    Type and Reason for Visit:  Initial(vent)    Nutrition Recommendations/Plan:   Plan for start of nutrition per MD & family wishes for care moving forward    Nutrition Assessment:  Pt with pmh of CKD, COPD, HLD, HTN, DM adm r/t cardiac arrest with prolonged CPR. Pt is intubated on pressor support but no propofol. MD noted poor prognosis with evidence of hypoxic brain injury. Family deciding on goals of care moving forward. Malnutrition Assessment:  Malnutrition Status:  Insufficient data    Context:  Chronic Illness       Estimated Daily Nutrient Needs:  Energy (kcal):  816-1530 (8-15 x  kg); Weight Used for Energy Requirements:        Protein (g):   (.8-1.2 x ABW (adj for CKD); Weight Used for Protein Requirements:           Fluid (ml/day):  1 ml per kcal; Weight Used for Fluid Requirements:         Nutrition Related Findings:  Noted trace generalized edema. Wounds:  None       Current Nutrition Therapies:    Diet NPO Effective Now    Anthropometric Measures:  · Height: 5' (152.4 cm)  · Current Body Weight: 225 lb (102.1 kg)   · Ideal Body Weight: 100 lbs; % Ideal Body Weight 225 %   · BMI: 43.9  · BMI Categories: Obese Class 3 (BMI 40.0 or greater)       Nutrition Diagnosis:   · Inadequate oral intake related to impaired respiratory function as evidenced by intubation      Nutrition Interventions:   Food and/or Nutrient Delivery:  Continue NPO(plan of care for nutrition per MD)  Nutrition Education/Counseling:  No recommendation at this time   Coordination of Nutrition Care:  Continued Inpatient Monitoring    Goals:  per MD plan of care moving forward       Nutrition Monitoring and Evaluation:   Physical Signs/Symptoms Outcomes:  Biochemical Data, Constipation, Diarrhea, Fluid Status or Edema, Weight, Skin, Nutrition Focused Physical Findings, Hemodynamic Status     Discharge Planning:     Too soon to determine     Electronically signed by Orvel Scales, ANA LAURA HAJI on 10/2/20 at 12:54 PM EDT    Contact: Mary Mcgowan

## 2020-10-02 NOTE — ED NOTES
Out of town family on their way to see patient      Holly Keith, [address] [facility]  10/02/20 4614

## 2020-10-02 NOTE — PLAN OF CARE
Nutrition Problem #1: Inadequate oral intake  Intervention: Food and/or Nutrient Delivery: Continue NPO(plan of care for nutrition per MD)  Nutritional Goals: per MD plan of care moving forward

## 2020-10-02 NOTE — ED PROVIDER NOTES
ACETAMINOPHEN (TYLENOL) 325 MG TABLET    Take 650 mg by mouth every 6 hours as needed for Pain    ALBUTEROL (PROVENTIL) (5 MG/ML) 0.5% NEBULIZER SOLUTION    Take 1 mL by nebulization 4 times daily as needed for Wheezing    ALBUTEROL SULFATE HFA (VENTOLIN HFA) 108 (90 BASE) MCG/ACT INHALER    Inhale 2 puffs into the lungs every 6 hours as needed for Wheezing    BUDESONIDE (PULMICORT) 0.25 MG/2ML NEBULIZER SUSPENSION    Take 2 mLs by nebulization 2 times daily    CETIRIZINE (ZYRTEC) 10 MG TABLET    Take 5-10 mg by mouth daily as needed for Allergies    FLUTICASONE (FLOVENT HFA) 44 MCG/ACT INHALER    Inhale 1 puff into the lungs 2 times daily    HYDROCHLOROTHIAZIDE (HYDRODIURIL) 25 MG TABLET    Take 25 mg by mouth daily    HYDROCODONE-ACETAMINOPHEN (NORCO) 5-325 MG PER TABLET    Take 0.5 tablets by mouth every 6 hours as needed. IPRATROPIUM-ALBUTEROL (DUONEB) 0.5-2.5 (3) MG/3ML SOLN NEBULIZER SOLUTION    Inhale 3 mLs into the lungs every 4 hours    LEVEMIR FLEXPEN 100 UNIT/ML INJECTION    Inject into the skin nightly 10-18 units    LUMIGAN 0.01 % SOLN    Place 1 drop into both eyes nightly. MEDROXYPROGESTERONE (DEPO-PROVERA) 150 MG/ML INJECTION    Inject 150 mg into the muscle every 3 months    MUPIROCIN (BACTROBAN) 2 % OINTMENT    Apply topically 3 times daily Apply topically 3 times daily. NOVOLOG FLEXPEN 100 UNIT/ML INJECTION PEN    Inject 16 Units into the skin 3 times daily (with meals)    PREDNISONE (DELTASONE) 20 MG TABLET    Take 2 tablets by mouth daily for 4 days    PROPRANOLOL (INDERAL LA) 80 MG EXTENDED RELEASE CAPSULE    Take 80 mg by mouth daily    ROSUVASTATIN (CRESTOR) 10 MG TABLET    Take 0.5 tablets by mouth nightly. TRIAMCINOLONE (KENALOG) 0.1 % CREAM    Bid rash       ALLERGIES     Ace inhibitors; Atorvastatin; Ciprofloxacin; Ciprofloxacin in d5w; Codeine; Fenofibrate; Glipizide; Macrobid [nitrofurantoin monohyd macro];  Nitrofurantoin; Pioglitazone; Pravastatin; Simvastatin; Xalatan is obese. She is toxic-appearing. HENT:      Head: Atraumatic. Right Ear: Tympanic membrane normal.      Left Ear: Tympanic membrane normal.      Nose: No congestion or rhinorrhea. Mouth/Throat:      Mouth: Mucous membranes are moist.      Pharynx: No oropharyngeal exudate or posterior oropharyngeal erythema. Eyes:      General:         Right eye: No discharge. Left eye: No discharge. Comments: Fixed and dialted pupils    Neck:      Vascular: No carotid bruit. Cardiovascular:      Rate and Rhythm: Regular rhythm. Tachycardia present. Pulmonary:      Comments: Mechanical breath sounds   Abdominal:      General: There is no distension. Palpations: Abdomen is soft. Hernia: No hernia is present. Genitourinary:     General: Normal vulva. Vagina: No vaginal discharge. Musculoskeletal:         General: No deformity. Right lower leg: No edema. Left lower leg: No edema. Lymphadenopathy:      Cervical: No cervical adenopathy. Skin:     Coloration: Skin is not pale. Findings: No erythema or rash. Neurological:      Comments: Fixed dilated pupils GCS 3   Psychiatric:      Comments: Unresponsive          DIAGNOSTIC RESULTS     EKG: All EKG's are interpreted by the Emergency Department Physician who either signs or Co-signs this chart in the absence of acardiologist.    EKG shows sinus tachycardia ST elevations AVR and V1 with depressions in inferior lateral leads- NOT STEMI but concerning for Epi effect vs ischemia     Discussed with Dr Lorenza Carvajal- 2/2 clinical picture it was decided that cath lab not appropriate. Dr Lorenza Carvajal dn I both agree.      RADIOLOGY:   Non-plain film images such as CT, Ultrasoundand MRI are read by the radiologist. Plain radiographic images are visualized and preliminarily interpreted by the emergency physician with the below findings:    Impression    Single small subsegmental occlusive embolism in the right lower lobe.  No    other pulmonary emboli.  Findings regarding the parenchymal lung process as    well as pulmonary embolism discussed with Garrett Salas in the emergency    department at 4:18 a.m. on 10/02/2020.         The endotracheal tube terminates at the moncho.  Recommend pulling this back    a couple cm.         Cardiomegaly with interlobular septal thickening as well as patchy    ground-glass changes suggestive of cardiogenic pulmonary edema.  Probable    lower lobe atelectatic changes, mildly increased from the previous    examination.  Atypical pneumonia felt less likely.         Enlarged thyroid gland felt related to probable multinodular thyroid goiter. This would be best assessed with sonography given the presence of a large    heterogeneous appearing nodule measuring 3.9 cm.         There is a nondisplaced 6th right anterior rib fracture, as well as    nondisplaced 7th anterior rib fracture.  No posterior rib fractures are seen    on the left. Impression    Findings suspicious for interval development of mild diffuse brain edema. MRI may be obtained if clinically indicated.       ED BEDSIDE ULTRASOUND:   Performed by ED Physician - none    LABS:  Labs Reviewed   BLOOD GAS, ARTERIAL - Abnormal; Notable for the following components:       Result Value    pH, Arterial 7.194 (*)     pCO2, Arterial 46.9 (*)     pO2, Arterial 529.0 (*)     HCO3, Arterial 18.1 (*)     Base Excess, Arterial -9.8 (*)     Hemoglobin, Art, Extended 11.6 (*)     All other components within normal limits    Narrative:     Mary Caraballo tel. [phone],  Chemistry results called to and read back by Alyx Hopkins, 10/02/2020 02:35,  by [facility]  Performed at:  [place] [address]   Phone [phone]   CBC WITH AUTO DIFFERENTIAL - Abnormal; Notable for the following components:    WBC 16.2 (*)     RBC 3.59 (*)     Hemoglobin 10.5 (*)     Hematocrit 34.1 (*)     MCHC 30.7 (*)     RDW 15.5 (*)     Neutrophils Absolute 9.1 (*)     Lymphocytes Absolute 6.0 (*)     Metamyelocytes Relative 2 (*)     Anisocytosis Occasional (*)     All other components within normal limits    Narrative:     Performed at:  [facility]  [address] [facility] [place] [facility] [employer] [address]   Phone [phone]   BASIC METABOLIC PANEL W/ REFLEX TO MG FOR LOW K - Abnormal; Notable for the following components:    Chloride 94 (*)     Anion Gap 23 (*)     Glucose 276 (*)     BUN 29 (*)     CREATININE 1.3 (*)     GFR Non- 39 (*)     GFR  47 (*)     All other components within normal limits    Narrative:     Performed at:  [facility]  [address] [facility] [place] [employer] [place] [address]   Phone [phone]   LACTIC ACID, PLASMA - Abnormal; Notable for the following components:    Lactic Acid 13.2 (*)     All other components within normal limits    Narrative:     Yamile Bustillos tel. [phone],  Chemistry results called to and read back by Pinky Liu, 10/02/2020 02:52,  by [facility]  Performed at:  [facility]  [address] [facility] [place] [employer] [address] [address]   Phone [phone]   POCT GLUCOSE - Abnormal; Notable for the following components:    POC Glucose 198 (*)     All other components within normal limits    Narrative:     Performed at:  [facility]  [address] [facility] [place] [employer] [place] [address]   Phone [phone]   TROPONIN    Narrative:     Performed at:  [facility]  [address] [place] [address] [place] [employer] [place] [address]   Phone [phone]   BRAIN NATRIURETIC PEPTIDE    Narrative:     Performed at:  [facility]  [address] [place] [address] [facility] [place] [facility] [employer] [address] [address]   Phone [phone]   URINE RT REFLEX TO CULTURE    Narrative:     Performed at:  [facility]  [address] [place] [address]   Phone [phone]   CK   BASIC METABOLIC PANEL   BASIC METABOLIC PANEL   MAGNESIUM   MAGNESIUM   PHOSPHORUS   PHOSPHORUS   CALCIUM, IONIZED   CALCIUM, IONIZED   BLOOD GAS, ARTERIAL   LACTIC ACID, PLASMA   PROTIME-INR   BASIC METABOLIC PANEL   MAGNESIUM   PHOSPHORUS   CALCIUM, IONIZED   BLOOD GAS, ARTERIAL   LACTIC ACID, PLASMA   LACTIC ACID, PLASMA   APTT   APTT   APTT   APTT       All other labs were withinnormal range or not returned as of this dictation. EMERGENCY DEPARTMENT COURSE and DIFFERENTIAL DIAGNOSIS/MDM:     PMH, Surgical Hx, FH, Social Hx reviewed by myself (ETOH usage, Tobacco usage, Drug usage reviewed by myself, no pertinent Hx)- No Pertinent Hx     Old records were reviewed by me    80 yr old with cardiac arrest. Per family she was complaining trouble breathing and arrested at home. EMS arrived did CPR for 30 minutes. On arrival to ER PEA arrest still. After 5 minutes ROSC obtained. Patient unresponsive with fixed and dilated pupils concerning for anoxia (CT head shows cerebral edema). Abxs given. Heparin started for PE. Hypothermia protocol initiated. Discussed with Dr Louis Le for cardiology, we decided cath lab not indicated at this time. Admission to ICU. CRITICAL CARE TIME   Total Critical Caretime was 125 minutes, excluding separately reportable procedures. There was a high probability of clinically significant/life threatening deterioration in the patient's condition which required my urgent intervention. PROCEDURES:  Intubation    Date/Time: 10/2/2020 4:50 AM  Performed by: Grayson Nicole MD  Authorized by: Grayson Nicole MD     Consent:     Consent obtained:  Emergent situation  Pre-procedure details:     Patient status:  Unresponsive  Procedure details:     Preoxygenation:  Bag valve mask    Laryngoscope blade:   Mac 4    Tube size (mm):  7.5    Number of attempts:  1    Cricoid pressure: yes      Tube visualized through cords: yes    Placement assessment:     Tube secured with:  ETT luis    Breath sounds:  Equal    Placement verification: chest rise, condensation, CXR verification and direct visualization      CXR findings:  ETT in proper place  Post-procedure details:     Patient tolerance of procedure: Tolerated well, no immediate complications  Central Line    Date/Time: 10/2/2020 4:50 AM  Performed by: Tima Parsons MD  Authorized by: Tima Parsons MD     Consent:     Consent obtained:  Emergent situation  Pre-procedure details:     Hand hygiene: Hand hygiene performed prior to insertion      Sterile barrier technique: All elements of maximal sterile technique followed      Skin preparation:  2% chlorhexidine    Skin preparation agent: Skin preparation agent completely dried prior to procedure    Procedure details:     Location:  R femoral    Patient position:  Flat    Procedural supplies:  Triple lumen    Catheter size:  7 Fr    Landmarks identified: yes      Ultrasound guidance: yes      Sterile ultrasound techniques: Sterile gel and sterile probe covers were used      Number of attempts:  1    Successful placement: yes    Post-procedure details:     Post-procedure:  Dressing applied    Assessment:  Blood return through all ports and free fluid flow    Patient tolerance of procedure: Tolerated well, no immediate complications  Comments:      EBL 10 cc       FINAL IMPRESSION      1. Cardiac arrest ([address]    2. Acute pulmonary embolism without acute cor pulmonale, unspecified pulmonary embolism type ([address]    3. Lactic acidosis    4. Metabolic acidosis    5.  Acute respiratory failure, unspecified whether with hypoxia or hypercapnia [facility])          DISPOSITION/PLAN   DISPOSITION Decision To Admit 10/02/2020 04:42:35 AM    ICU    (Please note that portions ofthis note were completed with a voice recognition program.  Efforts were made to edit the dictations but occasionally words are mis-transcribed.)    Tima Parsons MD(electronically signed)  Attending Emergency Physician        Maura Guerra MD  10/02/20 2190

## 2020-10-02 NOTE — PROGRESS NOTES
0642: Patient arrived to unit via stretcher from ED. Patient transported to ICU bed with 4 RNs.     1664: Report received from Cuca Jude, [facility]. All questions answered. 6045: Patient unresponsive to painful stimulus or verbal stimulus. Pupils pinpoint and non-reactive at this time. Skin cool and dry, generalized scattered bruises noted. VSS, tolerating ventilator well.    0703: Spoke with MARCUS from [facility] due to GCS of 3.     0727: Spoke with Yamini Chen from [facility], will follow case at this time. Yamini Chen requested call back if family decided to withdraw care.    Electronically signed by Elvira Quiroga RN on 10/2/2020 at 7:44 AM

## 2020-10-02 NOTE — PROGRESS NOTES
1155 Pt extubated per family wishes. Positioned for comfort. Support provided.  [address] Paul Blanc to bedside for examination. TOD noted. 200 Pt's  and daughter left. Pt's earrings and ring sent with family. Family to call security with name of  home.

## 2020-10-02 NOTE — TELEPHONE ENCOUNTER
Elle Canales calls to report that patient suffered collapse at home yesterday, EMS was called, patient now on in ICU at [place]. He wants Dr. Bright Zavala to be aware.

## 2020-10-02 NOTE — ED NOTES
Patient's  reports he found Chago Gomez half way out of bed saying she couldn't breath. Some time after he called 911 she stopped breathing and EMS started CPR on scene where they stayed for 30mins and continued until arrived to the ED.       Markos Wu RN  10/02/20 0178

## 2020-10-02 NOTE — DISCHARGE SUMMARY
Hospitalist Discharge Summary    Patient ID:  Hipolito Whittaker  [id number]  26 y.o.  1935    Admit date: 10/2/2020    Discharge date: 10/2/2020    Disposition:     Admission Diagnoses:   Patient Active Problem List   Diagnosis    Abscess of leg, right    [facility]ation [facility]    COPD (chronic obstructive pulmonary disease) ([facility]    Kidney mass    Hepatobiliary duct injury    Asthma exacerbation    DM (diabetes mellitus) ([facility]    Choledocholithiasis    NANCY (obstructive sleep apnea)    Morbid obesity (HCC)    Cholecystitis    Epigastric pain    Acute respiratory failure with hypoxia and hypercapnia (HCC)    Acute metabolic encephalopathy    Respiratory failure with hypercapnia (HCC)    Environmental allergies    Hyperglycemia    Acute cystitis without hematuria    Acute encephalopathy    Respiratory acidosis    CO2 narcosis    Hypoxic ischemic encephalopathy    Cardiopulmonary arrest ([facility]    Shock ([facility]    Lactic acidosis    Acute pulmonary edema (HCC)    PAN (acute kidney injury) ([facility]    Anoxic encephalopathy ([facility]       Discharge Diagnoses: Active Problems:    Hypoxic ischemic encephalopathy    Cardiopulmonary arrest (HCC)    Shock (HCC)    Lactic acidosis    Acute pulmonary edema (HCC)    PAN (acute kidney injury) ([facility]    Anoxic encephalopathy ([facility]  Resolved Problems:    * No resolved hospital problems. *      Code Status:  DNR-CC    Condition:      Discharge Diet: Diet:  Diet NPO Effective Now      Hospital Course:     22-year-old with history of COPD, NANCY, obesity, hypertension, hyperlipidemia with recent admission for COPD exacerbation requiring BiPAP who presented with witnessed PEA arrest.  says she was short of breath, he called EMS and she stopped breathing before EMS arrived. To the ED with airway in place and getting CPR. 30 minutes CPR with EMS, ROSC after 5 minutes in ED. CTH with cerebral edema. Hypothermia protocol initiated. CTPA with small non-obs PE. Lactic acid 13 on admission. Patient unresponsive to stimuli. She was intubated and started on pressors, transferred to the ICU. Family at bedside in the morning, very clear that patient would not want any further interventions and given likely severe anoxic brain injury decided to withdraw care. She was terminally extubated. Time of death 12:14 PM.  Family at bedside. Discharge Medications:   Current Discharge Medication List        Current Discharge Medication List        Current Discharge Medication List      CONTINUE these medications which have NOT CHANGED    Details   acetaminophen (TYLENOL) 325 MG tablet Take 650 mg by mouth every 6 hours as needed for Pain      ipratropium-albuterol (DUONEB) 0.5-2.5 (3) MG/3ML SOLN nebulizer solution Inhale 3 mLs into the lungs every 4 hours  Qty: 360 mL, Refills: 1    Associated Diagnoses: COPD, severe (HCC)      budesonide (PULMICORT) 0.25 MG/2ML nebulizer suspension Take 2 mLs by nebulization 2 times daily  Qty: 60 ampule, Refills: 5    Associated Diagnoses: COPD, severe (HCC)      predniSONE (DELTASONE) 20 MG tablet Take 2 tablets by mouth daily for 4 days  Qty: 8 tablet, Refills: 0      NOVOLOG FLEXPEN 100 UNIT/ML injection pen Inject 16 Units into the skin 3 times daily (with meals)      HYDROcodone-acetaminophen (NORCO) 5-325 MG per tablet Take 0.5 tablets by mouth every 6 hours as needed. hydroCHLOROthiazide (HYDRODIURIL) 25 MG tablet Take 25 mg by mouth daily      medroxyPROGESTERone (DEPO-PROVERA) 150 MG/ML injection Inject 150 mg into the muscle every 3 months      albuterol sulfate HFA (VENTOLIN HFA) 108 (90 Base) MCG/ACT inhaler Inhale 2 puffs into the lungs every 6 hours as needed for Wheezing      mupirocin (BACTROBAN) 2 % ointment Apply topically 3 times daily as needed (infection) Apply topically 3 times daily PRN infection.       fluticasone (FLOVENT HFA) 44 MCG/ACT inhaler Inhale 1 puff into the lungs 2 times daily  Qty: 1 Inhaler, Refills: 3      propranolol (INDERAL LA) 80 MG extended release capsule Take 80 mg by mouth daily      triamcinolone (KENALOG) 0.1 % cream Apply 1 each topically 2 times daily as needed (rash/irritation)       cetirizine (ZYRTEC) 10 MG tablet Take 5-10 mg by mouth daily as needed for Allergies      albuterol (PROVENTIL) (5 MG/ML) 0.5% nebulizer solution Take 1 mL by nebulization 4 times daily as needed for Wheezing  Qty: 120 each, Refills: 3      rosuvastatin (CRESTOR) 10 MG tablet Take 0.5 tablets by mouth nightly. Qty: 30 tablet, Refills: 3      LUMIGAN 0.01 % SOLN Place 1 drop into both eyes nightly. LEVEMIR FLEXPEN 100 UNIT/ML injection Inject into the skin nightly 10-18 units           Current Discharge Medication List          Procedures: None     Assessment on Discharge: Stable, improved     Discharge Exam:  BP (!) 99/49   Pulse 86   Temp 98 °F (36.7 °C) (Oral)   Resp 25   Ht 5' (1.524 m)   Wt 224 lb 10.4 oz (101.9 kg)   SpO2 94%   BMI 43.87 kg/m²     Patient . No breath sounds, no cardiac activity per exam or on monitor. Pupils fixed and dilated. Pertinent Studies During Hospital Stay:    Radiology:  Xr Chest (2 Vw)    Result Date: 2020  EXAMINATION: TWO XRAY VIEWS OF THE CHEST 2020 12:18 pm COMPARISON: 2020. HISTORY: ORDERING SYSTEM PROVIDED HISTORY: SOB, PNA? TECHNOLOGIST PROVIDED HISTORY: Reason for exam:->SOB, PNA? Reason for Exam: COPD,SOB, PNA? Acuity: Acute Type of Exam: Initial FINDINGS: The cardiomediastinal silhouette is stable. Aortic vascular calcification. Increased opacity over the bases likely related to overlying soft tissues. There are small bilateral pleural effusions with blunting of the posterior costophrenic sulci. No focal infiltrate or evidence of overt failure. No acute osseous findings. Small bilateral pleural effusions. No focal infiltrate or overt failure.      Ct Head Wo Contrast    Result Date: 10/2/2020  EXAMINATION: CT OF THE HEAD WITHOUT CONTRAST  10/2/2020 2:45 am TECHNIQUE: CT of the head was performed without the administration of intravenous contrast. Dose modulation, iterative reconstruction, and/or weight based adjustment of the mA/kV was utilized to reduce the radiation dose to as low as reasonably achievable. COMPARISON: 09/22/2020. HISTORY: ORDERING SYSTEM PROVIDED HISTORY: Cardiac arrest TECHNOLOGIST PROVIDED HISTORY: Has a \"code stroke\" or \"stroke alert\" been called? ->No Reason for exam:->Cardiac arrest Reason for Exam: Cardiac arrest Acuity: Acute Type of Exam: Initial FINDINGS: BRAIN/VENTRICLES: There is no acute intracranial hemorrhage, mass effect or midline shift. No abnormal extra-axial fluid collection. The gray-white differentiation is  less distinct compared to the prior study. .  There has been slight interval effacement of all cerebral sulci. Hypoattenuation of the periventricular and subcortical white matter is suggestive of chronic small vessel ischemic disease. Mild diffuse parenchymal volume loss is noted. There is no evidence of hydrocephalus. ORBITS: The visualized portion of the orbits demonstrate no acute abnormality. SINUSES: Air-fluid levels noted in the bilateral maxillary, sphenoid, frontal sinuses and ethmoid air cells. Mucoperiosteal thickening of the left sphenoid sinus is seen. There is minimal opacification of the bilateral mastoid air cells. SOFT TISSUES/SKULL:  No acute abnormality of the visualized skull or soft tissues. Findings suspicious for interval development of mild diffuse brain edema. MRI may be obtained if clinically indicated.      Ct Head Wo Contrast    Result Date: 9/22/2020  EXAMINATION: CT OF THE HEAD WITHOUT CONTRAST  9/22/2020 3:52 pm TECHNIQUE: CT of the head was performed without the administration of intravenous contrast. Dose modulation, iterative reconstruction, and/or weight based adjustment of the mA/kV was utilized to reduce the radiation dose to as low as reasonably achievable. COMPARISON: None. HISTORY: ORDERING SYSTEM PROVIDED HISTORY: AMS TECHNOLOGIST PROVIDED HISTORY: Reason for exam:->AMS Has a \"code stroke\" or \"stroke alert\" been called? ->No Reason for Exam: ams Acuity: Acute Type of Exam: Initial Relevant Medical/Surgical History: hx diabetes FINDINGS: BRAIN/VENTRICLES: There is no acute intracranial hemorrhage, mass effect or midline shift. No abnormal extra-axial fluid collection. The gray-white differentiation is maintained without evidence of an acute infarct. There is no evidence of hydrocephalus. ORBITS: The visualized portion of the orbits demonstrate no acute abnormality. SINUSES: Complete opacification of the left sphenoid air cells is noted, with wall thickening, consistent with chronic sinusitis. SOFT TISSUES/SKULL:  No acute abnormality of the visualized skull or soft tissues. 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 1. No acute intracranial hemorrhage, intra-axial mass, or acute territorial infarct 2. Chronic sphenoid sinus disease     Xr Chest Portable    Result Date: 10/2/2020  EXAMINATION: ONE XRAY VIEW OF THE CHEST 10/2/2020 2:21 am COMPARISON: 09/26/2020 HISTORY: ORDERING SYSTEM PROVIDED HISTORY: SOB TECHNOLOGIST PROVIDED HISTORY: Reason for exam:->SOB Reason for Exam: intubation, line placement Acuity: Acute Type of Exam: Initial FINDINGS: Tip of the endotracheal tube is at the level of moncho. Distal tip of the gastric tube projects over the stomach. Heart size is normal. No focal consolidation in the lungs. No pleural effusion or pneumothorax. No acute cardiopulmonary abnormality. Interval placement of endotracheal and nasogastric tubes.      Xr Chest Portable    Result Date: 9/22/2020  EXAMINATION: ONE XRAY VIEW OF THE CHEST 9/22/2020 3:49 pm COMPARISON: PATRICA OTT, 06/14/2020 HISTORY: ORDERING SYSTEM PROVIDED HISTORY: altered mental status, decreased bilateral breath sounds, COPD TECHNOLOGIST PROVIDED HISTORY: Reason for exam:->altered mental status, decreased bilateral breath sounds, COPD Reason for Exam: ams; descrease bilateral breathing sounds; COPD Acuity: Acute Type of Exam: Initial FINDINGS: The patient is rotated slightly. The cardiac silhouette, mediastinal hilar contours are stable. There is no focal airspace disease. No pneumothorax. No acute cardiopulmonary disease is appreciated. Ct Chest Pulmonary Embolism W Contrast    Result Date: 10/2/2020  EXAMINATION: CTA OF THE CHEST 10/2/2020 3:53 am TECHNIQUE: CTA of the chest was performed after the administration of intravenous contrast.  Multiplanar reformatted images are provided for review. MIP images are provided for review. Dose modulation, iterative reconstruction, and/or weight based adjustment of the mA/kV was utilized to reduce the radiation dose to as low as reasonably achievable. COMPARISON: 09/28/2020 HISTORY: ORDERING SYSTEM PROVIDED HISTORY: cardiac arrest TECHNOLOGIST PROVIDED HISTORY: Reason for exam:->cardiac arrest Reason for Exam: Cardiac Arrest Acuity: Acute Type of Exam: Initial FINDINGS: Pulmonary Arteries: There is a small occlusive subsegmental embolism noted within the right lower lobe, seen on axial image 80, coronal image 105. No other pulmonary emboli are seen. Mediastinum: No thoracic aortic dissection. No thoracic aortic aneurysm is identified. Atherosclerotic calcifications are seen. No great vessel stenosis is identified. Coronary artery atherosclerotic disease is identified. No significant pericardial effusion in. Endotracheal tube terminates at the moncho. Lungs/pleura: Patchy ground-glass changes are seen in the lungs bilaterally with interlobular smooth septal thickening as well as mild cardiomegaly. Lower lobe atelectasis is identified. No pleural effusion is identified. Upper Abdomen: No acute process seen in the upper abdomen.   No adrenal mass seen within the visualized SYSTEM PROVIDED HISTORY: tachycardia, elevated D dimer TECHNOLOGIST PROVIDED HISTORY: Reason for exam:->tachycardia, elevated D dimer Reason for Exam: tachycardia, elevated D dimer, chest pain a few days ago, FINDINGS: Pulmonary Arteries: Pulmonary arteries are adequately opacified for evaluation. No evidence of intraluminal filling defect to suggest pulmonary embolism. Main pulmonary artery is normal in caliber. Mediastinum: No evidence of mediastinal lymphadenopathy. The heart and pericardium demonstrate no acute abnormality. There is no acute abnormality of the thoracic aorta. Lungs/pleura: There are trace bilateral pleural effusions with minimal bibasilar dependent atelectasis. Upper Abdomen: Limited images of the upper abdomen are unremarkable. Soft Tissues/Bones: No acute bone or soft tissue abnormality. No evidence of pulmonary embolism or acute pulmonary abnormality.        Last Labs on Discharge:     Recent Results (from the past 24 hour(s))   POCT Glucose    Collection Time: 10/02/20  1:57 AM   Result Value Ref Range    POC Glucose 198 (H) 70 - 99 mg/dl    Performed on ACCU-CHEK    EKG 12 Lead    Collection Time: 10/02/20  2:05 AM   Result Value Ref Range    Ventricular Rate 139 BPM    Atrial Rate 139 BPM    P-R Interval 182 ms    QRS Duration 84 ms    Q-T Interval 214 ms    QTc Calculation (Bazett) 325 ms    P Axis 68 degrees    R Axis -33 degrees    T Axis 256 degrees    Diagnosis       Sinus tachycardiaLow voltage QRS in precordial leadsRSR' or QR pattern in V1 suggests right ventricular conduction delayST depression, consider subendocardial injuryAbnormal ECGWhen compared with ECG of 27-SEP-2020 18:43,ST now depressed in Inferior leadsST now depressed in Anterolateral leadsT wave inversion now evident in Inferior leadsT wave inversion now evident in Anterolateral leadsConfirmed by PIPPA ARCE, Román Bose ([id number]) on 10/2/2020 9:04:28 AM   CBC Auto Differential    Collection Time: 10/02/20  2:17 AM Result Value Ref Range    WBC 16.2 (H) 4.0 - 11.0 K/uL    RBC 3.59 (L) 4.00 - 5.20 M/uL    Hemoglobin 10.5 (L) 12.0 - 16.0 g/dL    Hematocrit 34.1 (L) 36.0 - 48.0 %    MCV 95.0 80.0 - 100.0 fL    MCH 29.2 26.0 - 34.0 pg    MCHC 30.7 (L) 31.0 - 36.0 g/dL    RDW 15.5 (H) 12.4 - 15.4 %    Platelets 537 417 - 273 K/uL    MPV 7.3 5.0 - 10.5 fL    SLIDE REVIEW see below     Path Consult Yes     Neutrophils % 52.0 %    Lymphocytes % 37.0 %    Monocytes % 6.0 %    Eosinophils % 1.0 %    Basophils % 0.0 %    Neutrophils Absolute 9.1 (H) 1.7 - 7.7 K/uL    Lymphocytes Absolute 6.0 (H) 1.0 - 5.1 K/uL    Monocytes Absolute 1.0 0.0 - 1.3 K/uL    Eosinophils Absolute 0.2 0.0 - 0.6 K/uL    Basophils Absolute 0.0 0.0 - 0.2 K/uL    Bands Relative 2 0 - 7 %    Metamyelocytes Relative 2 (A) %    Anisocytosis Occasional (A)    Basic Metabolic Panel w/ Reflex to MG    Collection Time: 10/02/20  2:17 AM   Result Value Ref Range    Sodium 138 136 - 145 mmol/L    Potassium reflex Magnesium 4.4 3.5 - 5.1 mmol/L    Chloride 94 (L) 99 - 110 mmol/L    CO2 21 21 - 32 mmol/L    Anion Gap 23 (H) 3 - 16    Glucose 276 (H) 70 - 99 mg/dL    BUN 29 (H) 7 - 20 mg/dL    CREATININE 1.3 (H) 0.6 - 1.2 mg/dL    GFR Non-African American 39 (A) >60    GFR  47 (A) >60    Calcium 9.5 8.3 - 10.6 mg/dL   Troponin    Collection Time: 10/02/20  2:17 AM   Result Value Ref Range    Troponin <0.01 <0.01 ng/mL   Brain Natriuretic Peptide    Collection Time: 10/02/20  2:17 AM   Result Value Ref Range    Pro- 0 - 449 pg/mL   Lactic Acid, Plasma    Collection Time: 10/02/20  2:17 AM   Result Value Ref Range    Lactic Acid 13.2 (HH) 0.4 - 2.0 mmol/L   APTT    Collection Time: 10/02/20  2:17 AM   Result Value Ref Range    aPTT 30.8 24.2 - 36.2 sec   Blood Smear Review    Collection Time: 10/02/20  2:17 AM   Result Value Ref Range    Path Consult Reviewed    Blood Gas, Arterial    Collection Time: 10/02/20  2:25 AM   Result Value Ref Range    pH, Magnesium 1.60 (L) 1.80 - 2.40 mg/dL   Phosphorus    Collection Time: 10/02/20  8:24 AM   Result Value Ref Range    Phosphorus 4.6 2.5 - 4.9 mg/dL   Calcium, Ionized    Collection Time: 10/02/20  8:24 AM   Result Value Ref Range    Calcium, Ion 1.03 (L) 1.12 - 1.32 mmol/L    pH, Ilan 7.425 7.350 - 7.450   Lactic Acid, Plasma    Collection Time: 10/02/20  8:25 AM   Result Value Ref Range    Lactic Acid 4.2 (HH) 0.4 - 2.0 mmol/L   Blood Gas, Arterial    Collection Time: 10/02/20  8:45 AM   Result Value Ref Range    pH, Arterial 7.526 (H) 7.350 - 7.450    pCO2, Arterial 31.1 (L) 35.0 - 45.0 mmHg    pO2, Arterial 194.0 (H) 75.0 - 108.0 mmHg    HCO3, Arterial 25.8 21.0 - 29.0 mmol/L    Base Excess, Arterial 3.5 (H) -3.0 - 3.0 mmol/L    Hemoglobin, Art, Extended 12.4 12.0 - 16.0 g/dL    O2 Sat, Arterial 98.1 >92 %    Carboxyhgb, Arterial 0.0 0.0 - 1.5 %    Methemoglobin, Arterial 0.6 <1.5 %    TCO2, Arterial 26.7 Not Established mmol/L    O2 Content, Arterial 17 Not Established mL/dL    O2 Therapy See comment    POCT Glucose    Collection Time: 10/02/20  9:59 AM   Result Value Ref Range    POC Glucose 207 (H) 70 - 99 mg/dl    Performed on ACCU-CHEK    Calcium, Ionized    Collection Time: 10/02/20 10:20 AM   Result Value Ref Range    Calcium, Ion 1.06 (L) 1.12 - 1.32 mmol/L    pH, Ilan 7.388 7.350 - 7.450         Follow up: with Kathryn Song MD    Note that more  than 30 minutes was spent in preparing discharge papers, discussing discharge with patient, medication review, etc.    Thank you Kathryn Song MD for the opportunity to be involved in this patient's care. If you have any questions or concerns please feel free to contact me at [phone]. Electronically signed by Roge Conde MD on 10/2/2020 at 1:13 PM    This note was transcribed using [employer]. Please disregard any translational errors.

## 2020-10-02 NOTE — PROGRESS NOTES
H/p dictation id [id number]. Date of service 10/02/20. Cardiac arrest with HIE. Acute PE.  COPD. Morbid obesity. PAN.

## 2020-10-02 NOTE — ED NOTES
Took pt blood sugar it was 198. Nurse Roxanna Dominguez was at bedside and was notify.      Tanesha Palumbo  10/02/20 0210

## 2020-10-02 NOTE — PROGRESS NOTES
4 Eyes Skin Assessment     NAME:  Higinio Enriquez OF BIRTH:  1935  MEDICAL RECORD NUMBER:  [id number]    The patient is being assess for  Admission    I agree that 2 RN's have performed a thorough Head to Toe Skin Assessment on the patient. ALL assessment sites listed below have been assessed. Areas assessed by both nurses:    Head, Face, Ears, Shoulders, Back, Chest, Arms, Elbows, Hands, Sacrum. Buttock, Coccyx, Ischium and Legs. Feet and Heels        Does the Patient have a Wound?  No noted wound(s)       Ryan Prevention initiated:  Yes   Wound Care Orders initiated:  No    Pressure Injury (Stage 3,4, Unstageable, DTI, NWPT, and Complex wounds) if present place consult order under [de-identified] No    New and Established Ostomies if present place consult order under : No      Nurse 1 eSignature: Electronically signed by Mira Roe RN on 10/2/20 at 6:26 AM EDT    **SHARE this note so that the co-signing nurse is able to place an eSignature**    Nurse 2 eSignature: {Esignature:[id number]}

## 2020-10-02 NOTE — DEATH NOTES
Death Pronouncement Note  Patient's Name: Juancarlos Prater   Patient's YOB: 1935  MRN Number: [id number]    Admitting Provider: Mis Joshua MD  Attending Provider: Katia Caceres MD    Patient was examined and the following were absent: Pulses, Blood Pressure and Respiratory effort    I declared the patient dead 10/02/20 at [phone].      Preliminary Cause of Death: hypoxic resp failure with PEA arrest, anoxic brain injury    Electronically signed by Katia Caceres MD on 10/2/20 at 1:12 PM EDT

## 2020-10-02 NOTE — H&P
[address] Ayanna Mandel 16                              HISTORY AND PHYSICAL    PATIENT NAME: Kash Gipson                  :        1935  MED REC NO:   [id number]                          ROOM:       2111  ACCOUNT NO:   [de-identified]                           ADMIT DATE: 10/02/2020  PROVIDER:     Sean Daigle MD    I obtained the history and performed the physical exam on the patient in  the Emergency Room on 10/02/2020. CHIEF COMPLAINT:  Cardiac arrest.    SOURCE OF HISTORY:  ER documentation. The patient is unable to provide  any history. HISTORY OF PRESENT ILLNESS:  The patient is a morbidly obese female who  lives at home with her , had subacute onset of gradually  progressive rapidly increasing shortness of breath, because of which her   called EMS. The patient ended up having a cardiac arrest with  prolonged CPR by the EMS crew with return of spontaneous circulation. The patient in the ER is completely nonresponsive, not on any sedation,  not withdrawing to painful stimuli. The CT scan is showing evidence of  hypoxic brain injury. CPR was in progress even when the patient arrived  to the hospital because the patient was in [facility] but had _____ after she  got the hospital.    PAST MEDICAL/PAST SURGICAL HISTORY:  1.  Morbid obesity. 2.  COPD. 3.  Obstructive sleep apnea. 4.  Hypertension. 5.  Dyslipidemia. 6.  Type 2 diabetes mellitus. PAST SURGICAL HISTORY:  Cholecystectomy, appendectomy, back surgery,  bunionectomy. FAMILY HISTORY:  Unobtainable from the patient because of the patient's  mentation. SOCIAL HISTORY:  Lives at home. MEDICATIONS:  The patient's home medication list has been reviewed by  me. They have been documented in the EMR. REVIEW OF SYSTEMS:  Unobtainable because of the patient's mentation.     ALLERGIC HISTORY:  The patient is allergic to 25 different medications  that have been listed in the EMR  including STATINS, CIPROFLOXACIN,  CODEINE. PHYSICAL EXAMINATION:  VITAL SIGNS:  Blood pressure initially was 140/80, respiratory rate is  22 which is the set rate on the ventilator, pulse 84, saturating 100% on  50% FiO2. CNS:  The patient is nonverbal, nonresponsive to any painful stimuli. PSYCH:  The patient is not responsive. HEENT:  Eyes:  Pupils are bilaterally equal,dilated and fixed. ENT:  No  oral mucosal lesions. The patient does have an NG tube with bloody  secretion in the tube. ABDOMEN:  Morbidly obese. MUSCULOSKELETAL:  No acute deformities. SKIN:  Appears pale but without rashes or lesions. DIAGNOSTIC DATA:  INR 1.17.    CT chest, PE protocol, shows small subsegmental pulmonary embolism,  cardiomegaly, anterior rib fractures. CT head shows mild diffuse brain edema. CBC shows white count 16.2, hemoglobin 10.5, hematocrit 34.1, platelets  of 698. Lactic acid 13.2. Troponin less than 0.01. BUN 29, creatinine  1.3, glucose 276, anion gap 23. Portable chest x-ray shows no acute cardiopulmonary anomaly. Arterial blood gas showed a pH of 7.1, pCO2 of 46.9, pO2 of _____. CONSULTATIONS:  Requesting consultation to Critical Care Medicine. ASSESSMENT:  1. Hypoxic ischemic encephalopathy secondary to cardiac arrest with  pulseless electrical activity and prolonged resuscitation. 2.  Acute pulmonary embolism. 3.  Morbid obesity with a BMI of 45.47 kg/m2. 4.  COPD. 5.  Type 2 diabetes. 6.  Acute kidney injury. PLAN OF CARE:  The patient is currently critically ill and is admitted  to Internal Medicine service to the Intensive Care Unit. Targeted  temperature management has been initiated. Pulmonology consult has been  requested. IV antibiotics have been initiated. Supplemental oxygen  therapy has been initiated as appropriate. GI prophylaxis will be continued.     The patient will be continued on a continuous heparin drip for her  pulmonary embolism. CODE STATUS:  Currently full. However, the prognosis is extremely poor  given the evidence of hypoxic ischemic brain damage on the CT scan and  complete lack of any meaningful brain stem reflexes or responses. Likelihood of recovery is poor. Family member is well aware. TOTAL CRITICAL CARE TIME:  35 minutes    EXPECTED LENGTH OF STAY:  More than two midnights based on the plan of  care above. RISK:  Very high due to the patient's presentation with the hypoxic  ischemic brain injury. DISPOSITION:  Admitted to the Intensive Care Unit in a critically ill  situation.         Nicole Kaur MD    D: 10/02/2020 6:14:27       T: 10/02/2020 7:11:11     [email or web address]  Job#: [id number]     Doc#: [id number]    CC:

## 2020-10-05 NOTE — TELEPHONE ENCOUNTER
I spoke with Mary Kong. He told me she collapsed at home after getting up from sleep. She felt short of breath while in bathroom then collapsed and could not be woken up. Family removed care from her on Friday after her CT Brain showed irreversible damage. I offered my condolences to him and his family.